# Patient Record
Sex: FEMALE | Race: WHITE | Employment: OTHER | URBAN - NONMETROPOLITAN AREA
[De-identification: names, ages, dates, MRNs, and addresses within clinical notes are randomized per-mention and may not be internally consistent; named-entity substitution may affect disease eponyms.]

---

## 2017-05-23 ENCOUNTER — OFFICE VISIT (OUTPATIENT)
Dept: CARDIOLOGY CLINIC | Age: 82
End: 2017-05-23

## 2017-05-23 VITALS
HEIGHT: 63 IN | BODY MASS INDEX: 35.97 KG/M2 | DIASTOLIC BLOOD PRESSURE: 80 MMHG | WEIGHT: 203 LBS | SYSTOLIC BLOOD PRESSURE: 136 MMHG | HEART RATE: 90 BPM

## 2017-05-23 DIAGNOSIS — I10 ESSENTIAL HYPERTENSION: Primary | Chronic | ICD-10-CM

## 2017-05-23 DIAGNOSIS — I25.10 CORONARY ARTERY DISEASE INVOLVING NATIVE CORONARY ARTERY OF NATIVE HEART WITHOUT ANGINA PECTORIS: Chronic | ICD-10-CM

## 2017-05-23 DIAGNOSIS — I35.0 NONRHEUMATIC AORTIC VALVE STENOSIS: Chronic | ICD-10-CM

## 2017-05-23 DIAGNOSIS — Z95.2 S/P AVR (AORTIC VALVE REPLACEMENT): Chronic | ICD-10-CM

## 2017-05-23 PROCEDURE — 99214 OFFICE O/P EST MOD 30 MIN: CPT | Performed by: INTERNAL MEDICINE

## 2017-09-25 ENCOUNTER — OFFICE VISIT (OUTPATIENT)
Dept: CARDIOLOGY CLINIC | Age: 82
End: 2017-09-25

## 2017-09-25 VITALS
DIASTOLIC BLOOD PRESSURE: 80 MMHG | HEART RATE: 76 BPM | SYSTOLIC BLOOD PRESSURE: 138 MMHG | WEIGHT: 202 LBS | BODY MASS INDEX: 35.79 KG/M2 | HEIGHT: 63 IN

## 2017-09-25 DIAGNOSIS — I10 ESSENTIAL HYPERTENSION: Chronic | ICD-10-CM

## 2017-09-25 DIAGNOSIS — I48.19 PERSISTENT ATRIAL FIBRILLATION (HCC): Chronic | ICD-10-CM

## 2017-09-25 DIAGNOSIS — I25.10 CORONARY ARTERY DISEASE INVOLVING NATIVE CORONARY ARTERY OF NATIVE HEART WITHOUT ANGINA PECTORIS: Chronic | ICD-10-CM

## 2017-09-25 DIAGNOSIS — Z95.2 S/P AVR (AORTIC VALVE REPLACEMENT): Primary | Chronic | ICD-10-CM

## 2017-09-25 PROCEDURE — 99213 OFFICE O/P EST LOW 20 MIN: CPT | Performed by: INTERNAL MEDICINE

## 2017-09-25 RX ORDER — AMLODIPINE BESYLATE 5 MG/1
5 TABLET ORAL DAILY
Qty: 90 TABLET | Refills: 3 | Status: SHIPPED | OUTPATIENT
Start: 2017-09-25 | End: 2018-07-03 | Stop reason: SDUPTHER

## 2018-07-03 ENCOUNTER — OFFICE VISIT (OUTPATIENT)
Dept: CARDIOLOGY CLINIC | Age: 83
End: 2018-07-03

## 2018-07-03 ENCOUNTER — PROCEDURE VISIT (OUTPATIENT)
Dept: CARDIOLOGY CLINIC | Age: 83
End: 2018-07-03

## 2018-07-03 VITALS
HEART RATE: 80 BPM | WEIGHT: 192 LBS | DIASTOLIC BLOOD PRESSURE: 84 MMHG | SYSTOLIC BLOOD PRESSURE: 132 MMHG | BODY MASS INDEX: 34.02 KG/M2 | HEIGHT: 63 IN

## 2018-07-03 DIAGNOSIS — I48.19 PERSISTENT ATRIAL FIBRILLATION (HCC): Chronic | ICD-10-CM

## 2018-07-03 DIAGNOSIS — Z95.2 S/P AVR (AORTIC VALVE REPLACEMENT): Chronic | ICD-10-CM

## 2018-07-03 DIAGNOSIS — I25.10 CORONARY ARTERY DISEASE INVOLVING NATIVE CORONARY ARTERY OF NATIVE HEART WITHOUT ANGINA PECTORIS: Primary | Chronic | ICD-10-CM

## 2018-07-03 LAB
LV EF: 60 %
LVEF MODALITY: NORMAL

## 2018-07-03 PROCEDURE — 93000 ELECTROCARDIOGRAM COMPLETE: CPT | Performed by: INTERNAL MEDICINE

## 2018-07-03 PROCEDURE — 99214 OFFICE O/P EST MOD 30 MIN: CPT | Performed by: INTERNAL MEDICINE

## 2018-07-03 PROCEDURE — 93306 TTE W/DOPPLER COMPLETE: CPT | Performed by: INTERNAL MEDICINE

## 2018-07-03 RX ORDER — AMLODIPINE BESYLATE 5 MG/1
5 TABLET ORAL DAILY
Qty: 90 TABLET | Refills: 3 | Status: SHIPPED | OUTPATIENT
Start: 2018-07-03 | End: 2019-05-31 | Stop reason: SDUPTHER

## 2018-07-03 NOTE — LETTER
[address] Cardiology [address] [address]  Phone: [phone]  Fax: [phone]    Margie Logan MD        July 5, 2018     Mela Marinelli MD  [address] [place] [id number]    Patient: Ro Little  MR Number: [id number]  YOB: 1935  Date of Visit: 7/3/2018    Dear Dr. Mela Marinelli:     Below are the relevant portions of my assessment and plan of care. [address]   Cardiac f/up    Referring Provider:  Mela Marinelli MD     Chief Complaint   Patient presents with    Coronary Artery Disease     Had echocardiogram today    Hypertension    Atrial Fibrillation        History of Present Illness:   Evert Vogel is an 80year old woman with a history of CVA , aortic valve replacement with porcine, and paroxysmal a fib. Has been taking Xarelto 10 mg daily and cardiologist in [place] asked her to increase to 15 mg. Has been taking it only a few times a week due to bruising. Is in [place] to April . Denies any recurrence of CVA symptoms, no shortness of breath,had her echo 7/3/18 prior to this visit. Past Medical History:   has a past medical history of Aortic valve disease; CAD (coronary artery disease); CVA (cerebral infarction); and Hypertension. Surgical History:   has a past surgical history that includes Coronary angioplasty with stent (12/22/2004); Total knee arthroplasty (Right, 7/15/2013); Cataract removal with implant (Left, 10/9/2015); Cardiac catheterization (11/19/2015); and Aortic valve replacement (12/4/2015). Social History:   reports that she has never smoked. She has never used smokeless tobacco. She reports that she does not drink alcohol or use drugs. Family History:  family history includes Heart Disease in her father. Home Medications:  Prior to Admission medications    Medication Sig Start Date End Date Taking?  Authorizing Provider rivaroxaban (XARELTO) 10 MG TABS tablet Take 1 tablet by mouth daily  Patient taking differently: Take 15 mg by mouth daily  9/25/17  Yes Deidra Forrest MD   amLODIPine [facility]) 5 MG tablet Take 1 tablet by mouth daily 9/25/17  Yes Deidra Forrest MD   Coenzyme Q10 (COQ-10 PO) Take by mouth   Yes Historical Provider, MD   aspirin 81 MG tablet Take 1 tablet by mouth three times a week  Patient taking differently: Take 81 mg by mouth daily  10/7/16  Yes Deidra Forrest MD   therapeutic multivitamin-minerals [facility]) tablet Take 1 tablet by mouth daily. Yes Historical Provider, MD        Allergies:  Lipitor and Penicillins     Review of Systems:   · Constitutional: there has been no unanticipated weight loss. There's been no change in energy level, sleep pattern, or activity level. · Eyes: No visual changes or diplopia. No scleral icterus. · ENT: No Headaches, hearing loss or vertigo. No mouth sores or sore throat. · Cardiovascular: Reviewed in HPI  · Respiratory: No cough or wheezing, no sputum production. No hematemesis. · Gastrointestinal: No abdominal pain, appetite loss, blood in stools. No change in bowel or bladder habits. · Genitourinary: No dysuria, trouble voiding, or hematuria. · Musculoskeletal:  No gait disturbance, weakness or joint complaints. · Integumentary: No rash or pruritis. · Neurological: No headache, diplopia, change in muscle strength, numbness or tingling. No change in gait, balance, coordination, mood, affect, memory, mentation, behavior. · Psychiatric: No anxiety, no depression. · Endocrine: No malaise, fatigue or temperature intolerance. No excessive thirst, fluid intake, or urination. No tremor. · Hematologic/Lymphatic: No abnormal bruising or bleeding, blood clots or swollen lymph nodes. · Allergic/Immunologic: No nasal congestion or hives.     Physical Examination:    Vitals:    07/03/18 1247   BP: 132/84   Pulse: 80 Constitutional and General Appearance: alert,oriented, no acute distress  Skin:good turgor,intact without lesions  HEENT: EOMI ,normal  Neck:no JVD    Respiratory:  · Normal excursion and expansion without use of accessory muscles  · Resp Auscultation: Normal breath sounds without dullness  Cardiovascular:  · The apical impulses not displaced  · Heart tones are crisp and normal  · Cervical veins are not engorged  · The carotid upstroke is normal in amplitude and contour without delay or bruit  · Peripheral pulses are symmetrical and full  · There is no clubbing, cyanosis of the extremities. · No edema  · Femoral Arteries: 2+ and equal  · Pedal Pulses: 2+ and equal   Abdomen:  · No masses or tenderness  · Liver/Spleen: No Abnormalities Noted  Neurological/Psychiatric:  · Alert and oriented in all spheres  · Moves all extremities well  · Exhibits normal gait balance and coordination  · No abnormalities of mood, affect, memory, mentation, or behavior are noted     Problem: Atrial fibrillation  Detected while exercising during cardiac rehab  EKG 9/26/16 showed Afib  EKG 7/3/18 sinus rhythm - a fib resolved. Does not want to be on Coumadin . Was discussed by Dr. Shilo Davenport who concurs that Xarelto is adequate as opposed to Coumadin since she has a tissue valve. She takes Xarelto 10mg qd (higher dose caused gives bruising).      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 Renu Ovalles. On xarelto due to AF/anti-coagulation. Repeat ECHO in May 2018.     Problem: CAD  Stable. No anginal symptoms. 2004: PCI of proximal LAD  2013: Cath with LM normal, LAD nid 30%, distal 40%, D1-40%, D4-50%(small), widely patent proximal stent, Cx Ostial 20%  RCA Mid 30% X2, and LVG 60%. Medically managed  11/2015: No flow-significant coronary stenoses are identified. The left anterior descending stent is widely patent.      Assessment/Plan:

## 2018-07-03 NOTE — PROGRESS NOTES
therapeutic multivitamin-minerals (THERAGRAN-M) tablet Take 1 tablet by mouth daily. Yes Historical Provider, MD        Allergies:  Lipitor and Penicillins     Review of Systems:   · Constitutional: there has been no unanticipated weight loss. There's been no change in energy level, sleep pattern, or activity level. · Eyes: No visual changes or diplopia. No scleral icterus. · ENT: No Headaches, hearing loss or vertigo. No mouth sores or sore throat. · Cardiovascular: Reviewed in HPI  · Respiratory: No cough or wheezing, no sputum production. No hematemesis. · Gastrointestinal: No abdominal pain, appetite loss, blood in stools. No change in bowel or bladder habits. · Genitourinary: No dysuria, trouble voiding, or hematuria. · Musculoskeletal:  No gait disturbance, weakness or joint complaints. · Integumentary: No rash or pruritis. · Neurological: No headache, diplopia, change in muscle strength, numbness or tingling. No change in gait, balance, coordination, mood, affect, memory, mentation, behavior. · Psychiatric: No anxiety, no depression. · Endocrine: No malaise, fatigue or temperature intolerance. No excessive thirst, fluid intake, or urination. No tremor. · Hematologic/Lymphatic: No abnormal bruising or bleeding, blood clots or swollen lymph nodes. · Allergic/Immunologic: No nasal congestion or hives.     Physical Examination:    Vitals:    07/03/18 1247   BP: 132/84   Pulse: 80        Constitutional and General Appearance: alert,oriented, no acute distress  Skin:good turgor,intact without lesions  HEENT: EOMI ,normal  Neck:no JVD    Respiratory:  · Normal excursion and expansion without use of accessory muscles  · Resp Auscultation: Normal breath sounds without dullness  Cardiovascular:  · The apical impulses not displaced  · Heart tones are crisp and normal  · Cervical veins are not engorged  · The carotid upstroke is normal in amplitude and contour without delay or bruit  · Peripheral pulses are symmetrical and full  · There is no clubbing, cyanosis of the extremities. · No edema  · Femoral Arteries: 2+ and equal  · Pedal Pulses: 2+ and equal   Abdomen:  · No masses or tenderness  · Liver/Spleen: No Abnormalities Noted  Neurological/Psychiatric:  · Alert and oriented in all spheres  · Moves all extremities well  · Exhibits normal gait balance and coordination  · No abnormalities of mood, affect, memory, mentation, or behavior are noted     Problem: Atrial fibrillation  Detected while exercising during cardiac rehab  EKG 9/26/16 showed Afib  EKG 7/3/18 sinus rhythm - a fib resolved. Does not want to be on Coumadin . Was discussed by Dr. Ant Amaya who concurs that Xarelto is adequate as opposed to Coumadin since she has a tissue valve. She takes Xarelto 10mg qd (higher dose caused gives bruising).      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 Kendra Meadows. On xarelto due to AF/anti-coagulation. Repeat ECHO in May 2018.     Problem: CAD  Stable. No anginal symptoms. 2004: PCI of proximal LAD  2013: Cath with LM normal, LAD nid 30%, distal 40%, D1-40%, D4-50%(small), widely patent proximal stent, Cx Ostial 20%  RCA Mid 30% X2, and LVG 60%. Medically managed  11/2015: No flow-significant coronary stenoses are identified. The left anterior descending stent is widely patent. Assessment/Plan:    1. Coronary artery disease involving native coronary artery of native heart without angina pectoris    2. S/P AVR (aortic valve replacement)7/3/18 Has slight regurgitation around the valve ring- will repeat echo in 1 year   3. Persistent atrial fibrillation ([address] - resolved ; EKG 7/3/18 sinus rhythm       Has been taking a subtherapeutic dose of Xarelto on 15 mg a few times a week with no untoward events. Have discussed risks/benefits. Will stop the Xarelto and stay on the ASA 81 mg  Will be seen in April 2019 when they return from [place].     I appreciate the opportunity of cooperating in the care of this individual.    Juve Serrano.  Marylen Loach, M.D., [facility]

## 2018-07-05 NOTE — COMMUNICATION BODY
[address]   Cardiac f/up    Referring Provider:  Anup Viveros MD     Chief Complaint   Patient presents with    Coronary Artery Disease     Had echocardiogram today    Hypertension    Atrial Fibrillation        History of Present Illness:   Lizabeth Kelly is an 80year old woman with a history of CVA , aortic valve replacement with porcine, and paroxysmal a fib. Has been taking Xarelto 10 mg daily and cardiologist in [place] asked her to increase to 15 mg. Has been taking it only a few times a week due to bruising. Is in [place] to [place] . Denies any recurrence of CVA symptoms, no shortness of breath,had her echo 7/3/18 prior to this visit. Past Medical History:   has a past medical history of Aortic valve disease; CAD (coronary artery disease); CVA (cerebral infarction); and Hypertension. Surgical History:   has a past surgical history that includes Coronary angioplasty with stent (12/22/2004); Total knee arthroplasty (Right, 7/15/2013); Cataract removal with implant (Left, 10/9/2015); Cardiac catheterization (11/19/2015); and Aortic valve replacement (12/4/2015). Social History:   reports that she has never smoked. She has never used smokeless tobacco. She reports that she does not drink alcohol or use drugs. Family History:  family history includes Heart Disease in her father. Home Medications:  Prior to Admission medications    Medication Sig Start Date End Date Taking?  Authorizing Provider   rivaroxaban (XARELTO) 10 MG TABS tablet Take 1 tablet by mouth daily  Patient taking differently: Take 15 mg by mouth daily  9/25/17  Yes Evi Strickland MD   amLODIPine [facility]) 5 MG tablet Take 1 tablet by mouth daily 9/25/17  Yes Evi Strickland MD   Coenzyme Q10 (COQ-10 PO) Take by mouth   Yes Historical Provider, MD   aspirin 81 MG tablet Take 1 tablet by mouth three times a week  Patient taking differently: Take 81 mg by mouth daily  10/7/16  Yes Evi Strickland MD opportunity of cooperating in the care of this individual.    Shad Mcqueen.  Mery Lorenzo M.D., [facility]

## 2018-09-24 NOTE — PROGRESS NOTES
engorged  · The carotid upstroke is normal in amplitude and contour without delay or bruit  · Peripheral pulses are symmetrical and full  · There is no clubbing, cyanosis of the extremities. · No edema  · Femoral Arteries: 2+ and equal  · Pedal Pulses: 2+ and equal   Abdomen:  · No masses or tenderness  · Liver/Spleen: No Abnormalities Noted  Neurological/Psychiatric:  · Alert and oriented in all spheres  · Moves all extremities well  · Exhibits normal gait balance and coordination  · No abnormalities of mood, affect, memory, mentation, or behavior are noted     Assessment:    Problem: CAD  Stable. No anginal symptoms. [facility] 2004> PCI of proximal LAD  [facility] 2013> Cath with LM normal, LAD mid 30%, distal 40%, D1-40%, D4-50%(small), widely patent proximal stent, Cx Ostial 20%. RCA Mid 30% X2 and LVG 60%. Medically managed  [facility] 2015> No flow-significant coronary stenoses are identified. The left anterior descending stent is widely patent. 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 Lorena Garduno. Taking baby asa,  Repeat ECHO July 2019. Problem: Hypertension  Stable  Blood pressure 126/74, pulse 80, height 5' 3\" (1.6 m), weight 187 lb 12.8 oz (85.2 kg). Problem: Atrial fibrillation, resolved  Detected while exercising during cardiac rehab  EKG 9/26/16 showed Afib  EKG 7/3/18 sinus rhythm - a fib resolved. High dose Xarelto gave her bruising. Takes baby asa.           Plan:  [facility] has a stable cardiac status. 1. No med changes. 2. Will continue with risk factor modifications. 3. Return for regular follow up in 6 months with ECHO same day (in [facility]). I appreciate the opportunity of cooperating in the care of this individual.    Bill Liu. Escobar Hull M.D., [facility]    This note was scribed in the presence of Dr. Escobar Hull MD, by Kristine Morris RN. The scribe's documentation has been prepared under my direction and personally reviewed by me in its entirety.  I confirm that the note

## 2018-09-26 ENCOUNTER — OFFICE VISIT (OUTPATIENT)
Dept: CARDIOLOGY CLINIC | Age: 83
End: 2018-09-26
Payer: MEDICARE

## 2018-09-26 VITALS
SYSTOLIC BLOOD PRESSURE: 126 MMHG | HEIGHT: 63 IN | DIASTOLIC BLOOD PRESSURE: 74 MMHG | HEART RATE: 80 BPM | WEIGHT: 187.8 LBS | BODY MASS INDEX: 33.27 KG/M2

## 2018-09-26 DIAGNOSIS — I48.0 PAF (PAROXYSMAL ATRIAL FIBRILLATION) (HCC): ICD-10-CM

## 2018-09-26 DIAGNOSIS — I25.10 CORONARY ARTERY DISEASE INVOLVING NATIVE CORONARY ARTERY OF NATIVE HEART WITHOUT ANGINA PECTORIS: Chronic | ICD-10-CM

## 2018-09-26 DIAGNOSIS — Z95.2 S/P AVR (AORTIC VALVE REPLACEMENT): Primary | Chronic | ICD-10-CM

## 2018-09-26 DIAGNOSIS — I10 ESSENTIAL HYPERTENSION: Chronic | ICD-10-CM

## 2018-09-26 PROCEDURE — 99214 OFFICE O/P EST MOD 30 MIN: CPT | Performed by: INTERNAL MEDICINE

## 2018-09-26 NOTE — LETTER
[address] [facility]  [address]  [address] [address]  Phone: [phone]  Fax: [phone]    Sari Titus MD        September 28, 2018     Erich Bower MD  [address] [place] [address]    Patient: Juan Hayward  MR Number: [id number]  YOB: 1935  Date of Visit: 9/26/2018    Dear Dr. Erich Bower:    Below are the relevant portions of my assessment and plan of care. [facility]   Cardiac Follow up    Referring Provider:  Erich Bower MD     Chief Complaint   Patient presents with    6 Month Follow-Up    Coronary Artery Disease    Cardiac Valve Problem     AVE    Hypertension    Atrial Fibrillation     history of      History of Present Illness:   Alyssa Ascencio is here today for routine follow of her porcine aortic valve replacement and paroxysmal a fib. She has had a CVA. Her last EKG showed NSR. Today, she reports an episode of pressure that felt like\"indigestion\" in center of chest a few days ago just before gong to bed. She drank some water, then went to bed. No episodes before or since. She does attend cardiac rehab [facility]) twice a week without issues. She denies exertional chest pain, FARRIS/PND, palpitations, light-headedness, edema. She khalil in [place] and sees a cardiologist there as well. Past Medical History:   has a past medical history of Aortic valve disease; CAD (coronary artery disease); CVA (cerebral infarction); and Hypertension. Surgical History:   has a past surgical history that includes Coronary angioplasty with stent (12/22/2004); Total knee arthroplasty (Right, 7/15/2013); Cataract removal with implant (Left, 10/9/2015); Cardiac catheterization (11/19/2015); and Aortic valve replacement (12/4/2015). Social History:   reports that she has never smoked. She has never used smokeless tobacco. She reports that she does not drink alcohol or use drugs. Family History:  family history includes Heart Disease in her father. Home Medications:  Prior to Admission medications    Medication Sig Start Date End Date Taking? Authorizing Provider   amLODIPine (NORVASC) 5 MG tablet Take 1 tablet by mouth daily 7/3/18   Brent Charles MD   Coenzyme Q10 (COQ-10 PO) Take by mouth    Historical Provider, MD   aspirin 81 MG tablet Take 1 tablet by mouth three times a week  Patient taking differently: Take 81 mg by mouth daily  10/7/16   South Small MD   therapeutic multivitamin-minerals [facility]) tablet Take 1 tablet by mouth daily. Historical Provider, MD      Allergies:  Lipitor and Penicillins     Review of Systems:   · Constitutional: there has been no unanticipated weight loss. There's been no change in energy level, sleep pattern, or activity level. · Eyes: No visual changes or diplopia. No scleral icterus. · ENT: No Headaches, hearing loss or vertigo. No mouth sores or sore throat. · Cardiovascular: Reviewed in HPI  · Respiratory: No cough or wheezing, no sputum production. No hematemesis. · Gastrointestinal: No abdominal pain, appetite loss, blood in stools. No change in bowel or bladder habits. · Genitourinary: No dysuria, trouble voiding, or hematuria. · Musculoskeletal:  No gait disturbance, weakness or joint complaints. · Integumentary: No rash or pruritis. · Neurological: No headache, diplopia, change in muscle strength, numbness or tingling. No change in gait, balance, coordination, mood, affect, memory, mentation, behavior. · Psychiatric: No anxiety, no depression. · Endocrine: No malaise, fatigue or temperature intolerance. No excessive thirst, fluid intake, or urination. No tremor. · Hematologic/Lymphatic: No abnormal bruising or bleeding, blood clots or swollen lymph nodes. · Allergic/Immunologic: No nasal congestion or hives.     Physical Examination:    Blood pressure 126/74, pulse 80, height 5' 3\" (1.6 m), weight 187 lb 12.8 oz (85.2 kg), not currently breastfeeding. Constitutional and General Appearance: alert,oriented, no acute distress  Skin:good turgor,intact without lesions  HEENT: EOMI ,normal  Neck:no JVD    Respiratory:  · Normal excursion and expansion without use of accessory muscles  · Resp Auscultation: Normal breath sounds without dullness  Cardiovascular:  · The apical impulses not displaced  · Heart tones are crisp and normal  · Cervical veins are not engorged  · The carotid upstroke is normal in amplitude and contour without delay or bruit  · Peripheral pulses are symmetrical and full  · There is no clubbing, cyanosis of the extremities. · No edema  · Femoral Arteries: 2+ and equal  · Pedal Pulses: 2+ and equal   Abdomen:  · No masses or tenderness  · Liver/Spleen: No Abnormalities Noted  Neurological/Psychiatric:  · Alert and oriented in all spheres  · Moves all extremities well  · Exhibits normal gait balance and coordination  · No abnormalities of mood, affect, memory, mentation, or behavior are noted     Assessment:    Problem: CAD  Stable. No anginal symptoms. [facility] 2004> PCI of proximal LAD  [facility] 2013> Cath with LM normal, LAD mid 30%, distal 40%, D1-40%, D4-50%(small), widely patent proximal stent, Cx Ostial 20%. RCA Mid 30% X2 and LVG 60%. Medically managed  [facility] 2015> No flow-significant coronary stenoses are identified. The left anterior descending stent is widely patent. 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 Marilu Vaughn. Taking baby asa,  Repeat ECHO July 2019. Problem: Hypertension  Stable  Blood pressure 126/74, pulse 80, height 5' 3\" (1.6 m), weight 187 lb 12.8 oz (85.2 kg). Problem: Atrial fibrillation, resolved  Detected while exercising during cardiac rehab  EKG 9/26/16 showed Afib  EKG 7/3/18 sinus rhythm - a fib resolved. High dose Xarelto gave her bruising. Takes baby asa.           Plan:  Luc Rodríguez has a stable cardiac status. 1. No med changes. 2. Will continue with risk factor modifications. 3. Return for regular follow up in 6 months with ECHO same day (in [facility]). I appreciate the opportunity of cooperating in the care of this individual.    Camila Morfin. Alyson Thao M.D., [facility]    This note was scribed in the presence of Dr. Alyson Thao MD, by Becki Darden, NIKI. The scribe's documentation has been prepared under my direction and personally reviewed by me in its entirety. I confirm that the note above accurately reflects all work, treatment, procedures, and medical decision making performed by me. If you have questions, please do not hesitate to call me. I look forward to following Guanakito Fears along with you.     Sincerely,        Chantel Kauffman MD

## 2018-09-28 NOTE — COMMUNICATION BODY
mouth daily  10/7/16   Tom Harris MD   therapeutic multivitamin-minerals [facility]) tablet Take 1 tablet by mouth daily. Historical Provider, MD      Allergies:  Lipitor and Penicillins     Review of Systems:   · Constitutional: there has been no unanticipated weight loss. There's been no change in energy level, sleep pattern, or activity level. · Eyes: No visual changes or diplopia. No scleral icterus. · ENT: No Headaches, hearing loss or vertigo. No mouth sores or sore throat. · Cardiovascular: Reviewed in HPI  · Respiratory: No cough or wheezing, no sputum production. No hematemesis. · Gastrointestinal: No abdominal pain, appetite loss, blood in stools. No change in bowel or bladder habits. · Genitourinary: No dysuria, trouble voiding, or hematuria. · Musculoskeletal:  No gait disturbance, weakness or joint complaints. · Integumentary: No rash or pruritis. · Neurological: No headache, diplopia, change in muscle strength, numbness or tingling. No change in gait, balance, coordination, mood, affect, memory, mentation, behavior. · Psychiatric: No anxiety, no depression. · Endocrine: No malaise, fatigue or temperature intolerance. No excessive thirst, fluid intake, or urination. No tremor. · Hematologic/Lymphatic: No abnormal bruising or bleeding, blood clots or swollen lymph nodes. · Allergic/Immunologic: No nasal congestion or hives. Physical Examination:    Blood pressure 126/74, pulse 80, height 5' 3\" (1.6 m), weight 187 lb 12.8 oz (85.2 kg), not currently breastfeeding.     Constitutional and General Appearance: alert,oriented, no acute distress  Skin:good turgor,intact without lesions  HEENT: EOMI ,normal  Neck:no JVD    Respiratory:  · Normal excursion and expansion without use of accessory muscles  · Resp Auscultation: Normal breath sounds without dullness  Cardiovascular:  · The apical impulses not displaced  · Heart tones are crisp and normal  · Cervical veins are not above accurately reflects all work, treatment, procedures, and medical decision making performed by me.

## 2019-05-29 NOTE — PROGRESS NOTES
MG tablet take 2 tablets by mouth on day one then 1 tablet daily on days 2 thru [address] [place] 5/28/19   Historical Provider, MD   predniSONE (DELTASONE) 10 MG tablet TAKE ONE TABLET BY MOUTH TWICE DAILY 5/28/19   Historical Provider, MD      Allergies:  Lipitor and Penicillins     Review of Systems:   · Constitutional: there has been no unanticipated weight loss. There's been no change in energy level, sleep pattern, or activity level. · Eyes: No visual changes or diplopia. No scleral icterus. · ENT: No Headaches, hearing loss or vertigo. No mouth sores or sore throat. · Cardiovascular: Reviewed in HPI  · Respiratory: No cough or wheezing, no sputum production. No hematemesis. · Gastrointestinal: No abdominal pain, appetite loss, blood in stools. No change in bowel or bladder habits. · Genitourinary: No dysuria, trouble voiding, or hematuria. · Musculoskeletal:  No gait disturbance, weakness or joint complaints. · Integumentary: No rash or pruritis. · Neurological: No headache, diplopia, change in muscle strength, numbness or tingling. No change in gait, balance, coordination, mood, affect, memory, mentation, behavior. · Psychiatric: No anxiety, no depression. · Endocrine: No malaise, fatigue or temperature intolerance. No excessive thirst, fluid intake, or urination. No tremor. · Hematologic/Lymphatic: No abnormal bruising or bleeding, blood clots or swollen lymph nodes. · Allergic/Immunologic: No nasal congestion or hives. Physical Examination:    Blood pressure (!) 160/82, pulse 84, height 5' 3\" (1.6 m), weight 181 lb (82.1 kg), SpO2 99 %, not currently breastfeeding.     Constitutional and General Appearance: alert,oriented, no acute distress  Skin:good turgor,intact without lesions  HEENT: EOMI ,normal  Neck:no JVD    Respiratory:  · Normal excursion and expansion without use of accessory muscles  · Resp Auscultation: Normal breath sounds without dullness  Cardiovascular:  · The apical

## 2019-05-31 ENCOUNTER — PROCEDURE VISIT (OUTPATIENT)
Dept: CARDIOLOGY CLINIC | Age: 84
End: 2019-05-31
Payer: MEDICARE

## 2019-05-31 ENCOUNTER — OFFICE VISIT (OUTPATIENT)
Dept: CARDIOLOGY CLINIC | Age: 84
End: 2019-05-31
Payer: MEDICARE

## 2019-05-31 VITALS
HEIGHT: 63 IN | BODY MASS INDEX: 32.07 KG/M2 | OXYGEN SATURATION: 99 % | SYSTOLIC BLOOD PRESSURE: 160 MMHG | DIASTOLIC BLOOD PRESSURE: 82 MMHG | WEIGHT: 181 LBS | HEART RATE: 84 BPM

## 2019-05-31 DIAGNOSIS — Z95.2 S/P AVR (AORTIC VALVE REPLACEMENT): Chronic | ICD-10-CM

## 2019-05-31 DIAGNOSIS — I25.10 CORONARY ARTERY DISEASE INVOLVING NATIVE CORONARY ARTERY OF NATIVE HEART WITHOUT ANGINA PECTORIS: Chronic | ICD-10-CM

## 2019-05-31 DIAGNOSIS — I48.0 PAF (PAROXYSMAL ATRIAL FIBRILLATION) (HCC): ICD-10-CM

## 2019-05-31 DIAGNOSIS — I25.10 CORONARY ARTERY DISEASE INVOLVING NATIVE CORONARY ARTERY OF NATIVE HEART WITHOUT ANGINA PECTORIS: Primary | Chronic | ICD-10-CM

## 2019-05-31 DIAGNOSIS — I35.0 NONRHEUMATIC AORTIC VALVE STENOSIS: Chronic | ICD-10-CM

## 2019-05-31 DIAGNOSIS — I10 ESSENTIAL HYPERTENSION: Chronic | ICD-10-CM

## 2019-05-31 LAB
LV EF: 60 %
LVEF MODALITY: NORMAL

## 2019-05-31 PROCEDURE — 93306 TTE W/DOPPLER COMPLETE: CPT | Performed by: INTERNAL MEDICINE

## 2019-05-31 PROCEDURE — 99214 OFFICE O/P EST MOD 30 MIN: CPT | Performed by: INTERNAL MEDICINE

## 2019-05-31 RX ORDER — PREDNISONE 10 MG/1
TABLET ORAL
Refills: 1 | Status: ON HOLD | COMMUNITY
Start: 2019-05-28 | End: 2019-08-08 | Stop reason: HOSPADM

## 2019-05-31 RX ORDER — AMLODIPINE BESYLATE 5 MG/1
5 TABLET ORAL DAILY
Qty: 90 TABLET | Refills: 3 | Status: ON HOLD | OUTPATIENT
Start: 2019-05-31 | End: 2019-08-08 | Stop reason: HOSPADM

## 2019-05-31 RX ORDER — AZITHROMYCIN 250 MG/1
TABLET, FILM COATED ORAL
Refills: 1 | Status: ON HOLD | COMMUNITY
Start: 2019-05-28 | End: 2019-08-08 | Stop reason: HOSPADM

## 2019-07-29 ENCOUNTER — HOSPITAL ENCOUNTER (INPATIENT)
Age: 84
LOS: 11 days | Discharge: HOME | DRG: 057 | End: 2019-08-09
Attending: PHYSICAL MEDICINE & REHABILITATION | Admitting: PHYSICAL MEDICINE & REHABILITATION
Payer: MEDICARE

## 2019-07-29 PROBLEM — I63.9 ACUTE ISCHEMIC STROKE (HCC): Status: ACTIVE | Noted: 2019-07-29

## 2019-07-29 PROCEDURE — 1280000000 HC REHAB R&B

## 2019-07-29 PROCEDURE — 94760 N-INVAS EAR/PLS OXIMETRY 1: CPT

## 2019-07-29 RX ORDER — CLOPIDOGREL BISULFATE 75 MG/1
75 TABLET ORAL DAILY
Status: DISCONTINUED | OUTPATIENT
Start: 2019-07-30 | End: 2019-08-09 | Stop reason: HOSPADM

## 2019-07-29 RX ORDER — AMLODIPINE BESYLATE 5 MG/1
10 TABLET ORAL DAILY
Status: DISCONTINUED | OUTPATIENT
Start: 2019-07-30 | End: 2019-08-09 | Stop reason: HOSPADM

## 2019-07-29 RX ORDER — ASPIRIN 81 MG/1
81 TABLET, CHEWABLE ORAL DAILY
Status: DISCONTINUED | OUTPATIENT
Start: 2019-07-30 | End: 2019-08-09 | Stop reason: HOSPADM

## 2019-07-29 RX ORDER — ACETAMINOPHEN 325 MG/1
650 TABLET ORAL EVERY 4 HOURS PRN
Status: DISCONTINUED | OUTPATIENT
Start: 2019-07-29 | End: 2019-08-09 | Stop reason: HOSPADM

## 2019-07-29 RX ORDER — TRAMADOL HYDROCHLORIDE 50 MG/1
50 TABLET ORAL EVERY 6 HOURS PRN
Status: DISCONTINUED | OUTPATIENT
Start: 2019-07-29 | End: 2019-08-09 | Stop reason: HOSPADM

## 2019-07-29 RX ORDER — ONDANSETRON 4 MG/1
4 TABLET, ORALLY DISINTEGRATING ORAL EVERY 8 HOURS PRN
Status: DISCONTINUED | OUTPATIENT
Start: 2019-07-29 | End: 2019-08-09 | Stop reason: HOSPADM

## 2019-07-29 RX ORDER — DIPHENHYDRAMINE HCL 25 MG
25 TABLET ORAL EVERY 6 HOURS PRN
Status: DISCONTINUED | OUTPATIENT
Start: 2019-07-29 | End: 2019-08-09 | Stop reason: HOSPADM

## 2019-07-29 RX ORDER — HYDRALAZINE HYDROCHLORIDE 25 MG/1
50 TABLET, FILM COATED ORAL EVERY 8 HOURS PRN
Status: DISCONTINUED | OUTPATIENT
Start: 2019-07-29 | End: 2019-08-09 | Stop reason: HOSPADM

## 2019-07-29 RX ORDER — TRAZODONE HYDROCHLORIDE 50 MG/1
50 TABLET ORAL NIGHTLY PRN
Status: DISCONTINUED | OUTPATIENT
Start: 2019-07-29 | End: 2019-08-09 | Stop reason: HOSPADM

## 2019-07-29 ASSESSMENT — PAIN SCALES - GENERAL: PAINLEVEL_OUTOF10: 0

## 2019-07-29 NOTE — FLOWSHEET NOTE
Patient admitted to room 4900  Assisted patient to bed with minimal assist of one person. Oriented to room, call light, phone, TV, thermostat, bed controls, bathroom, emergency cord, white board, therapy times, unit routine, visiting hours,  and meal times. Patient verbalized understanding. States bowel routine is every day  Call light and personal items in reach, alarms active and in place.          Patient has bilateral hearing aides at home but is afraid to bring them here because \" they might get lost\"

## 2019-07-29 NOTE — FLOWSHEET NOTE
Patient refused the camera to be placed in her room.  Walked to the bathroom with the use of a walker, gait steady, kept picking the walker up, educated patient that it had wheels on the front and she did not have to pick it up , Patient V/U

## 2019-07-30 LAB
ANION GAP SERPL CALCULATED.3IONS-SCNC: 10 MMOL/L (ref 3–16)
BUN BLDV-MCNC: 18 MG/DL (ref 7–20)
CALCIUM SERPL-MCNC: 9.4 MG/DL (ref 8.3–10.6)
CHLORIDE BLD-SCNC: 103 MMOL/L (ref 99–110)
CO2: 26 MMOL/L (ref 21–32)
CREAT SERPL-MCNC: 0.7 MG/DL (ref 0.6–1.2)
GFR AFRICAN AMERICAN: >60
GFR NON-AFRICAN AMERICAN: >60
GLUCOSE BLD-MCNC: 91 MG/DL (ref 70–99)
HCT VFR BLD CALC: 37.9 % (ref 36–48)
HEMOGLOBIN: 12 G/DL (ref 12–16)
MCH RBC QN AUTO: 26.8 PG (ref 26–34)
MCHC RBC AUTO-ENTMCNC: 31.7 G/DL (ref 31–36)
MCV RBC AUTO: 84.5 FL (ref 80–100)
PDW BLD-RTO: 20.1 % (ref 12.4–15.4)
PLATELET # BLD: 207 K/UL (ref 135–450)
PMV BLD AUTO: 10.3 FL (ref 5–10.5)
POTASSIUM SERPL-SCNC: 5.1 MMOL/L (ref 3.5–5.1)
RBC # BLD: 4.49 M/UL (ref 4–5.2)
SODIUM BLD-SCNC: 139 MMOL/L (ref 136–145)
WBC # BLD: 3.5 K/UL (ref 4–11)

## 2019-07-30 PROCEDURE — 97165 OT EVAL LOW COMPLEX 30 MIN: CPT

## 2019-07-30 PROCEDURE — 36415 COLL VENOUS BLD VENIPUNCTURE: CPT

## 2019-07-30 PROCEDURE — 97535 SELF CARE MNGMENT TRAINING: CPT

## 2019-07-30 PROCEDURE — 97116 GAIT TRAINING THERAPY: CPT

## 2019-07-30 PROCEDURE — 80048 BASIC METABOLIC PNL TOTAL CA: CPT

## 2019-07-30 PROCEDURE — 1280000000 HC REHAB R&B

## 2019-07-30 PROCEDURE — 92523 SPEECH SOUND LANG COMPREHEN: CPT

## 2019-07-30 PROCEDURE — 94760 N-INVAS EAR/PLS OXIMETRY 1: CPT

## 2019-07-30 PROCEDURE — 85027 COMPLETE CBC AUTOMATED: CPT

## 2019-07-30 PROCEDURE — 97530 THERAPEUTIC ACTIVITIES: CPT

## 2019-07-30 PROCEDURE — 6370000000 HC RX 637 (ALT 250 FOR IP): Performed by: PHYSICAL MEDICINE & REHABILITATION

## 2019-07-30 PROCEDURE — 97161 PT EVAL LOW COMPLEX 20 MIN: CPT

## 2019-07-30 PROCEDURE — 97110 THERAPEUTIC EXERCISES: CPT

## 2019-07-30 RX ADMIN — AMLODIPINE BESYLATE 10 MG: 5 TABLET ORAL at 08:02

## 2019-07-30 RX ADMIN — ASPIRIN 81 MG 81 MG: 81 TABLET ORAL at 08:03

## 2019-07-30 RX ADMIN — CLOPIDOGREL 75 MG: 75 TABLET, FILM COATED ORAL at 08:03

## 2019-07-30 ASSESSMENT — PAIN SCALES - GENERAL
PAINLEVEL_OUTOF10: 0
PAINLEVEL_OUTOF10: 0

## 2019-07-30 NOTE — CARE COORDINATION
Social Work Admission Assessment    Objective:  Met with pt to complete initial assessment and review role of  in rehab process. Pt oriented to unit. Pt states understanding of this. Current Home Situation:  Patient lives at home with , two granddtrs, and dog. Patient has received home care prior. Patient has a rolling walker at home already. Patient has advance directives in place already. Pt's plans re:  Return to work/school/volunteer:  Patient is retired. Accessibility to community resources/transportation:  Family to transport. Has pt experienced a recent loss or signigicant life event that would impact their care or ability to participate? __No  _X_Yes - Explain: Stroke with loss and deficits. Has pt ever been treated for emotional disorders? _X_No  __Yes--How does that affect current situation:    How does pt and family cope with stressful events and this hospitalization? Pt appears to be coping with these hospitalizations appropriately, no concerns voiced or observed. Special Problem Areas:  None. Discharge Plan:  Home with appropriate support, await progress with therapy for recommendations. Impression/Plan:  Jyoti Gamez (patient) is an 80year old female that has been admitted to ARU. Patient informed of team conference on Thursday after 11 a.m. Provided pt with this worker's card to contact as needed. Will continue to follow for support and discharge planning.  Gonzalo George, MSW, LSW

## 2019-07-30 NOTE — PROGRESS NOTES
AM assessment complete, charted in doc flowsheet. VSS. Pt up to bedside chair. Daughter in room for support to mother.   Call light in reach, will continue to monitor

## 2019-07-30 NOTE — H&P
Disease in her father. Allergies: Lipitor and Penicillins    Current Facility-Administered Medications   Medication Dose Route Frequency Provider Last Rate Last Dose    acetaminophen (TYLENOL) tablet 650 mg  650 mg Oral Q4H PRN Kg Mcmanus MD        magnesium hydroxide (MILK OF MAGNESIA) 400 MG/5ML suspension 30 mL  30 mL Oral Daily PRN Kg Mcmanus MD        amLODIPine (NORVASC) tablet 10 mg  10 mg Oral Daily Kg Mcmanus MD   10 mg at 07/30/19 0802    aspirin chewable tablet 81 mg  81 mg Oral Daily Kg Mcmanus MD   81 mg at 07/30/19 0803    clopidogrel (PLAVIX) tablet 75 mg  75 mg Oral Daily Kg Mcmanus MD   75 mg at 07/30/19 0803    diphenhydrAMINE (BENADRYL) tablet 25 mg  25 mg Oral Q6H PRN Kg Mcmanus MD        hydrALAZINE (APRESOLINE) tablet 50 mg  50 mg Oral Q8H PRN Kg Mcmanus MD        ondansetron (ZOFRAN-ODT) disintegrating tablet 4 mg  4 mg Oral Q8H PRN Kg Mcmanus MD        traMADol Leola Sheer) tablet 50 mg  50 mg Oral Q6H PRN Kg Mcmanus MD        traZODone (DESYREL) tablet 50 mg  50 mg Oral Nightly PRN Kg Mcmanus MD           REVIEW OF SYSTEMS:   CONSTITUTIONAL: negative for fevers, chills, diaphoresis, appetite change, fatigue, night sweats and unexpected weight change. EYES: negative for blurred vision, eye discharge, visual disturbance and icterus. HEENT: negative for hearing loss, tinnitus, ear drainage, sinus pressure, nasal congestion, and epistaxis. RESPIRATORY: Negative for hemoptysis, cough, sputum production. CARDIOVASCULAR: negative for chest pain, palpitations, exertional chest pressure/discomfort, edema, syncope. GASTROINTESTINAL: negative for nausea, vomiting, diarrhea, constipation, blood in stool and abdominal pain. GENITOURINARY: negative for frequency, dysuria, urinary incontinence, decreased urine volume, and hematuria. HEMATOLOGIC/LYMPHATIC: negative for easy bruising, bleeding and lymphadenopathy. Unremarkable  BONES: Unremarkable  OTHER: None    IMPRESSION      Mild small vessel ischemic change. Remote lacunar infarct    07/24/2019 11:00 AM EDT    HISTORY: Stroke, follow up left leg weakness  COMPARISON: 2015  NOTE: If there are questions about the content of this report, please contact [facility] radiology by calling [phone]  TECHNIQUE: Post intravenous contrast axial CT angiogram images with multiplanar reconstructions of the head including offline reconstructions on a separate workstation. Images were obtained using automated exposure control. 75 mL Optiray was   administered. Internal carotid artery stenoses are reported using the Jamie American Symptomatic Carotid Endarterectomy Trial (NASCET) criteria whereby the stenosis is expressed as a percentage from the view showing the greatest stenosis comparing the diameter of the   residual lumen with a luminal diameter of the internal carotid artery well beyond the bulb where the walls are parallel (Radiology 1993; 186: 316-318). FINDINGS:  Aortic arch: Mural calcification  Proximal great arteries: Normal  Right vertebral artery: Calcification at the origin and the distal right vertebral artery with high-grade stenosis distally. Left vertebral artery: Calcification at the origin of the left vertebral artery. There is also significant calcification stenosis in the distal left vertebral artery  Right common carotid artery: Normal  Right carotid bifurcation and bulb: Calcification of the carotid bulb and proximal right carotid artery with less than 50% narrowing. Right internal carotid artery: Calcification of the carotid siphon. Left common carotid artery: Normal  Left carotid bifurcation and bulb: Calcification at the carotid bifurcation with 50-70% stenosis at the origin of the left internal carotid artery. .  Left internal carotid artery: calcification of the carotid siphon        RIGHT MIDDLE CEREBRAL ARTERY: calcification at the origin.  No evidence of dissection or aneurysm. LEFT MIDDLE CEREBRAL ARTERY: Calcification at the origin. No evidence of dissection or aneurysm. RIGHT ANTERIOR CEREBRAL ARTERY: Unremarkable  LEFT ANTERIOR CEREBRAL ARTERY: Unremarkable  ANTERIOR COMMUNICATING ARTERY: Unremarkable    BASILAR ARTERY: Unremarkable  RIGHT POSTERIOR CEREBRAL ARTERY: Fetal origin of the right posterior cerebral artery directly from the internal carotid artery with absent P1 segment and no connection with the basilar artery, a normal variant  LEFT POSTERIOR CEREBRAL ARTERY: Unremarkable  LEFT POSTERIOR COMMUNICATING ARTERY: Not visualized    OTHER: None    IMPRESSION      No dissection or aneurysm    50-70% narrowing of the left carotid bifurcation and proximal internal carotid artery. Less than 50% narrowing at the right carotid bulb. Severe stenosis distal vertebral arteries bilaterally. 07/22/2019 2:04 PM EDT    HISTORY: Ataxia, stroke suspected. ataxia, stroke suspected, weakness on left side, trouble lifting left leg . COMPARISON: Head CT 7/27/2016. TECHNIQUE: Sagittal T1, coronal T2, and axial T1, T2, FLAIR, gradient, and diffusion weighted sequences were obtained through the brain on a 1.5 Iram magnet without administration of contrast.    FINDINGS:    Craniocervical junction / midline structures: Normal  Paranasal sinuses / mastoid air cells: There is minimal ethmoid mucosal thickening. Ventricles and Sulci: Mild, age-appropriate prominence. Ventricles remain symmetric and midline. . There is no extra-axial fluid collection. Vascular flow voids: Normal  Brain Parenchyma: There are punctate foci of diffusion restriction in the right lateral basal ganglia and adjacent corona radiata. There is an underlying remote lacunar infarct in this area. No evidence of mass effect or acute intracranial hemorrhage. Mild spotty white matter signal hyperintensity is seen within the deep white matter.   Other Notable Findings: has agreed to being admitted to our comprehensive inpatient  rehabilitation facility consisting of at least 180 minutes of therapy a day,  5 out of 7 days a week. The patient/family has a good understanding of our discharge process. The  patient has potential to make improvement and is in need of at least two of  the following multidisciplinary therapies including but not limited to  physical, occupational, respiratory, and speech, nutritional services, wound care, and prosthetics and orthotics. Given the patients complex condition  and risk of further medical complications, rehabilitation services cannot be  safely provided at a lower level of care such as a skilled nursing facility. I have compared the patients medical and functional status at the time of the  preadmission screening and the same on this date, and there are no significant changes except as documented below in the assessment and plan. By signing this document, I acknowledge that I have personally performed a  full physical examination on this patient within 24 hours of admission to  this inpatient rehabilitation facility and have determined the patient to be  able to tolerate the above course of treatment at an intensive level for a  reasonable period of time. I will be completing a detailed individualized  Plan of Care for this patient by day four of the patients stay based upon the  Preadmission Screen, this Post-Admission Evaluation, and the therapy  evaluations. Assessment and Plan:  Bilateral basal ganglia infarcts: R acute and L subacute with microhemorrhage. ASA 81, plavix. PT/OT for hemiparesis. SLP for eval.    Paroxysmal Afib: Cards to discuss possible AC. Consult placed. CAD: s/p CABG. ASA, plavix      HTN: norvasc 10    AS: s/p AVR. Bowels: Per protocol  Bladder: Per protocol   Sleep: Trazodone provided prn. Pain: tylenol, tramadol   DVT PPx: SCDs. Hold AC in setting of bleed.      Dylan Schulz MD 7/30/2019, 8:46 AM     * This document was created using dictation software. While all precautions were taken to ensure accuracy, errors may have occurred. Please disregard any typographical errors.

## 2019-07-31 PROCEDURE — 97127 HC SP THER IVNTJ W/FOCUS COG FUNCJ: CPT

## 2019-07-31 PROCEDURE — 94760 N-INVAS EAR/PLS OXIMETRY 1: CPT

## 2019-07-31 PROCEDURE — 99222 1ST HOSP IP/OBS MODERATE 55: CPT | Performed by: INTERNAL MEDICINE

## 2019-07-31 PROCEDURE — 1280000000 HC REHAB R&B

## 2019-07-31 PROCEDURE — 97530 THERAPEUTIC ACTIVITIES: CPT

## 2019-07-31 PROCEDURE — 97535 SELF CARE MNGMENT TRAINING: CPT

## 2019-07-31 PROCEDURE — 6370000000 HC RX 637 (ALT 250 FOR IP): Performed by: PHYSICAL MEDICINE & REHABILITATION

## 2019-07-31 PROCEDURE — 97116 GAIT TRAINING THERAPY: CPT

## 2019-07-31 RX ADMIN — ASPIRIN 81 MG 81 MG: 81 TABLET ORAL at 08:54

## 2019-07-31 RX ADMIN — CLOPIDOGREL 75 MG: 75 TABLET, FILM COATED ORAL at 08:54

## 2019-07-31 RX ADMIN — AMLODIPINE BESYLATE 10 MG: 5 TABLET ORAL at 08:54

## 2019-07-31 ASSESSMENT — PAIN SCALES - GENERAL: PAINLEVEL_OUTOF10: 0

## 2019-07-31 NOTE — PROGRESS NOTES
Physical Therapy  Facility/Department: [facility] ACUTE REHAB UNIT  Daily Treatment Note  NAME: Cecelia Vuong  : 1935  MRN: [id number]    Date of Service: 2019    Discharge Recommendations:  Home with assist PRN, Home with Home health PT, S Level 3   PT Equipment Recommendations  Equipment Needed: Yes  Mobility Devices: Carol Gubler: Rolling    Assessment   Body structures, Functions, Activity limitations: Decreased functional mobility ; Decreased strength;Decreased endurance;Decreased coordination;Decreased safe awareness;Decreased cognition;Decreased balance  Assessment: Pt continues with decreased coordination of LLE and decreased anterior weight shift in standing. Treatment Diagnosis: decreased coordination, balance, and functional mobility  Prognosis: Good  PT Education: Goals;PT Role;Transfer Training;General Safety;Plan of Care;Gait Training;Functional Mobility Training;Disease Specific Education  Barriers to Learning: cognition  REQUIRES PT FOLLOW UP: Yes  Activity Tolerance  Activity Tolerance: Patient Tolerated treatment well     Patient Diagnosis(es): There were no encounter diagnoses. has a past medical history of Aortic valve disease, CAD (coronary artery disease), CVA (cerebral infarction), and Hypertension. has a past surgical history that includes Coronary angioplasty with stent (2004); Total knee arthroplasty (Right, 7/15/2013); Cataract removal with implant (Left, 10/9/2015); Cardiac catheterization (2015); and Aortic valve replacement (2015). Restrictions  Restrictions/Precautions  Restrictions/Precautions: General Precautions, Fall Risk  Required Braces or Orthoses?: No  Position Activity Restriction  Other position/activity restrictions: 81 yo F admitted from [facility] presents with L side weakness, slurred speech.  Initially admitted at [facility]; OSH CTAHN revealed b/l vertebral stenosis, 63% stenosis of R ICA and stenosis of PCA; MRI brain from OSH revealed \"patchy acute infarct involving R lateral BG and adjacent corona radiata in area of remote infact\". Symptoms improved with d/c to home; around 7pm at home pt experienced sudden paralysis of L side of body, could not speak. Admitted to [facility] ED then to [facility]. Repeated MRI brain 7/25 revealed acute infarcts of right corona radiata/basal ganglion with microhemorrhages, deficits noted with subtle L side weakness in hand and leg  Subjective   General  Chart Reviewed: Yes  Additional Pertinent Hx: CAD, HTN  Response To Previous Treatment: Patient with no complaints from previous session. Family / Caregiver Present: No  Subjective  Subjective: Pt sitting in recliner on arrival and agreeable to therapy evaluation. Pt states she feels \"fuzzy since she had 2 norvasc this morning. \"  Pt states she does not always take her norvasc at home.   General Comment  Comments: /74 sitting with   Pain Screening  Patient Currently in Pain: Denies  Vital Signs  Patient Currently in Pain: Denies       Orientation     Cognition      Objective      Transfers  Sit to Stand: Contact guard assistance  Stand to sit: Contact guard assistance  Stand Pivot Transfers: Contact guard assistance  Ambulation  Ambulation?: Yes  Ambulation 1  Surface: level tile  Device: Rolling Walker  Assistance: Contact guard assistance  Quality of Gait: decreased L step length and height, L knee flexion during stance phase, medial/lateral sway, shuffling LLE, decreased arm swing B  Distance: 234'  Stairs/Curb  Stairs?: Yes  Stairs  # Steps : 14  Rails: Bilateral  Assistance: Contact guard assistance  Comment: alternating between reciprocal and step to pattern, decreased proprioception descending leading with LLE     Balance  Posture: Fair  Sitting - Static: Good  Sitting - Dynamic: Good  Standing - Static: Fair;-  Standing - Dynamic: Fair;-            Comment: 1st session:  Pt sitting in recliner on arrival.  Voicing concerns regarding BP

## 2019-07-31 NOTE — PROGRESS NOTES
ACUTE REHAB UNIT  SPEECH/LANGUAGE PATHOLOGY      [x] Daily  [] Weekly Care Conference Note  [] Discharge    Patient:Anuja Eid     :1935  VRF:[id number]  Room #: HVB-8076/9464-30   Rehab Dx/Hx: Acute ischemic stroke [facility]) [I63.9]  Date of Admit: 2019      Precautions: falls  Home situation: Pt lives at home with her  and two young-adult granddaughters. Pt was independent prior to admission   ST Dx: Cognitive linguistic deficits     Daily Treatment Info:   Date: 2019   Tx session 1   Pain Pt did not report pain   Subjective     Pt up in recliner chair. Reported her daughter was present this morning and assisted her with getting ready. Reported feeling frustrated by not being able to get ready first thing in the morning. Objective:  Goals    Pt will complete executive function tasks (planning, deductive reasoning, inferential, functional organization tasks) with 80% accuracy Pt complete time planning word problems with 100% accuracy. Pt required min cues to redirect right question. Pt described gaining insight into physical limitations after working with PT and not being able to walk independently   Pt will complete medication management and finance management tasks with 90% accuracy Pt completed picture money word problems with 100% accuracy. Pt required min cues to redirect to right question. Pt recalled medications she was taking when RN came in room with meds and asked reasons for taking each one. Other areas targeted:    Education:   Education was provided re rationale for treatment and POC   Safety Devices: [x] Call light within reach  [x] Chair alarm activated  [] Bed alarm activated  [] Other:     Assessment:   Speech Therapy Diagnosis  Cognitive Diagnosis: Pt with mild cognitive deficits characterized by reduced executive function, reasoning and alternating attention skills.  Pt's family reports she has had some difficulty with critical thinking skills and rated

## 2019-07-31 NOTE — PROGRESS NOTES
Stand by assistance     Transfers  Sit to stand: Stand by assistance  Stand to sit: Stand by assistance      Cognition  Overall Cognitive Status: Exceptions  Arousal/Alertness: Appropriate responses to stimuli  Following Commands: Follows one step commands consistently  Attention Span: Attends with cues to redirect  Memory: Decreased short term memory  Safety Judgement: Decreased awareness of need for assistance;Decreased awareness of need for safety  Problem Solving: Assistance required to implement solutions;Assistance required to identify errors made;Assistance required to generate solutions;Decreased awareness of errors  Insights: Decreased awareness of deficits       General Comment  Comments: Pt sit to stand SBA and pt ambulated with rw ~18 ft to shower. Pt shower transfer SBA to TTB placed in shower. Pt bathed UB (setup) and LB (setup/SBA). Pt dressed UB (setup/Supervision/vc shirt wasn't buttoned correctly and pt corrected error) and pt dressed LB - socks/pants/brief/shoe (setup/SBA). Pt then stood at sink at SBA ~6 min for grooming (oral care/comb hair) performed at Independent. Pt then ambulated back to chair at SBA and stand to sti SBA. Call light in reach and chair alarm on. ADDENDUM [id number]  Pt seated in chair upon entry, very pleasant and agreeable to therapy session. Pt sit to stand SBA and pt ambulated with rw at SBA ~60 ft into IADL kitchen. Pt actively participated in IADL cooking activity of making pancakes. Pt stood ~23 min for activity and performed at setup/SBA: requiring assist to open pancake mix bag, and assist to  dropped pancake box from floor. Pt safe during activity, able to sequence instructions with out assistance. Pt then ambulated ~60 ft at [facility] 54 with rw to toilet in room. Pt toilet transfer SBA and pt toileted SBA, pt stood at sink at SBA to wash hands (Independent) .  Pt then ambulated to chair in room and stand sit SBA (vc for hand placement for decent, pt did not follow, pt educated on importance of hand placement for decent. PT present at end of therapy session.          Plan   Plan  Times per week: 75 min; 5-7x/week  Times per day: Daily  Current Treatment Recommendations: Strengthening, Balance Training, Self-Care / ADL, Safety Education & Training, Cognitive/Perceptual Training, Equipment Evaluation, Education, & procurement, Neuromuscular Re-education, Functional Mobility Training, Endurance Training, Patient/Caregiver Education & Training    Goals  Short term goals  Time Frame for Short term goals: 10-14 days  Short term goal 1: Fxl mob for ADL mod I  Short term goal 2: Fxl transfers for ADL mod I  Short term goal 3: IADL mod I  Short term goal 4: UB/LB dressing mod I  Short term goal 5: UB/LB bathing mod I  Long term goals  Time Frame for Long term goals : LTG=STG       Therapy Time   Individual Concurrent Group Co-treatment   Time In 1015         Time Out 1100         Minutes 45         Timed Code Treatment Minutes: 45 Minutes     Therapy Time   Individual Concurrent Group Co-treatment   Time In 2146         Time Out 2190         Minutes 30         Timed Code Treatment Minutes: 30 Minutes    Timed Code Treatment Minutes:   45 + 30    Total Treatment Minutes:  75 minutes       WINNIE Sevilla/YAZAN [id number]  Cosign: Leon MATHIS/YAZAN, MOT #[id number]

## 2019-07-31 NOTE — FLOWSHEET NOTE
Called Cardiology office to verify that they received a consult.  Someone aakash arreguin this patient today

## 2019-08-01 ENCOUNTER — TELEPHONE (OUTPATIENT)
Dept: CARDIOLOGY CLINIC | Age: 84
End: 2019-08-01

## 2019-08-01 LAB
ANION GAP SERPL CALCULATED.3IONS-SCNC: 11 MMOL/L (ref 3–16)
BUN BLDV-MCNC: 17 MG/DL (ref 7–20)
CALCIUM SERPL-MCNC: 9.2 MG/DL (ref 8.3–10.6)
CHLORIDE BLD-SCNC: 106 MMOL/L (ref 99–110)
CO2: 23 MMOL/L (ref 21–32)
CREAT SERPL-MCNC: 0.6 MG/DL (ref 0.6–1.2)
GFR AFRICAN AMERICAN: >60
GFR NON-AFRICAN AMERICAN: >60
GLUCOSE BLD-MCNC: 95 MG/DL (ref 70–99)
HCT VFR BLD CALC: 36.5 % (ref 36–48)
HEMOGLOBIN: 11.7 G/DL (ref 12–16)
MCH RBC QN AUTO: 27.1 PG (ref 26–34)
MCHC RBC AUTO-ENTMCNC: 32 G/DL (ref 31–36)
MCV RBC AUTO: 84.7 FL (ref 80–100)
PDW BLD-RTO: 20.1 % (ref 12.4–15.4)
PLATELET # BLD: 192 K/UL (ref 135–450)
PMV BLD AUTO: 9.9 FL (ref 5–10.5)
POTASSIUM SERPL-SCNC: 4.4 MMOL/L (ref 3.5–5.1)
RBC # BLD: 4.31 M/UL (ref 4–5.2)
SODIUM BLD-SCNC: 140 MMOL/L (ref 136–145)
WBC # BLD: 3.5 K/UL (ref 4–11)

## 2019-08-01 PROCEDURE — 99223 1ST HOSP IP/OBS HIGH 75: CPT | Performed by: INTERNAL MEDICINE

## 2019-08-01 PROCEDURE — 85027 COMPLETE CBC AUTOMATED: CPT

## 2019-08-01 PROCEDURE — 36415 COLL VENOUS BLD VENIPUNCTURE: CPT

## 2019-08-01 PROCEDURE — 97535 SELF CARE MNGMENT TRAINING: CPT

## 2019-08-01 PROCEDURE — 1280000000 HC REHAB R&B

## 2019-08-01 PROCEDURE — 97530 THERAPEUTIC ACTIVITIES: CPT

## 2019-08-01 PROCEDURE — 97112 NEUROMUSCULAR REEDUCATION: CPT

## 2019-08-01 PROCEDURE — 6370000000 HC RX 637 (ALT 250 FOR IP): Performed by: PHYSICAL MEDICINE & REHABILITATION

## 2019-08-01 PROCEDURE — 97116 GAIT TRAINING THERAPY: CPT

## 2019-08-01 PROCEDURE — 97127 HC SP THER IVNTJ W/FOCUS COG FUNCJ: CPT

## 2019-08-01 PROCEDURE — 80048 BASIC METABOLIC PNL TOTAL CA: CPT

## 2019-08-01 RX ADMIN — AMLODIPINE BESYLATE 10 MG: 5 TABLET ORAL at 08:04

## 2019-08-01 RX ADMIN — CLOPIDOGREL 75 MG: 75 TABLET, FILM COATED ORAL at 08:04

## 2019-08-01 RX ADMIN — ASPIRIN 81 MG 81 MG: 81 TABLET ORAL at 08:04

## 2019-08-01 NOTE — PROGRESS NOTES
baseline. Current Diet Order: DIET GENERAL;            Plan:     Frequency:  5days/week   30 minutes/day    Discharge Recommendations:   Barriers:   Discharge Recommendations:  [x] Home independently  [] Home with assistance []  24 hour supervision  [] SNF [] Other:  Continued SLP Treatment:  [] Yes [x] No [] TBD based on progress while on ARU [] Vital Stim indicated [] Other:   Estimated discharge date:  8/9/19    Type of Total Treatment Minutes   Session 1     Time In 0830   Time Out 0900    Timed Code Minutes  30   Individual Treatment Minutes  30   Co-Treatment Minutes     Group Treatment Minutes     Concurrent Treatment Minutes       TOTAL DAILY MINUTES:  30    Electronically Signed by     Christina STORM CCC-SLP #[id number] 8/1/2019 7:48 AM  Speech-Language Pathologist

## 2019-08-01 NOTE — CONSULTS
Nutrition Assessment    Type and Reason for Visit: Initial, Consult    Nutrition Recommendations:   No nutrition recommendations at this time    Nutrition Assessment: Pt is adequately nourished as evidence by pt reports of a \"great\" appetite with greater than 50% of meals consumed/ wt stable. Pt denies following any special diets prior to admission. Pt has no nutrition questions or concerns at this time. Malnutrition Assessment:  · Malnutrition Status: No malnutrition    Nutrition Risk Level: Low    Nutrient Needs:  · Estimated Daily Total Kcal: 3459-5365  · Estimated Daily Protein (g): 68-86  · Estimated Daily Total Fluid (ml/day): 1 ml/kcal    Nutrition Diagnosis:   · Problem: No nutrition diagnosis at this time    Objective Information:  · Nutrition-Focused Physical Findings: no edema noted, +BM   · Wound Type: None  · Current Nutrition Therapies:  · Oral Diet Orders: General   · Oral Diet intake: %  · Oral Nutrition Supplement (ONS) Orders: None  · Anthropometric Measures:  · Ht: 5' 5\" (165.1 cm)   · Current Body Wt: 184 lb (83.5 kg)  · Ideal Body Wt: 125 lb (56.7 kg),   · BMI Classification: BMI 30.0 - 34.9 Obese Class I    Nutrition Interventions:   Continue current diet  Continued Inpatient Monitoring, Education Not Indicated    Nutrition Evaluation:   · Evaluation: Goals set   · Goals: Pt will consume greater than 75% of all meals.      · Monitoring: Meal Intake, Weight      Electronically signed by Tello Will RD, LD on 7/30/19 at 9:27 AM    Contact Number: [id number]
- cough, sputum  · Cardiovascular: Reviewed in HPI  · Gastrointestinal: negative for - abdominal pain, diarrhea, N/V  · Hematology: negative for - bleeding, blood clots, bruising or jaundice  · Genito-Urinary:  negative for - Dysuria or incontinence  · Musculoskeletal: negative for - Joint swelling, muscle pain  · Neurological: negative for - confusion, dizziness, headaches   · Psychiatric: No anxiety, no depression. · Dermatological: negative for - rash    Physical Examination:  Vitals:    19 0745   BP: (!) 170/85   Pulse: 99   Resp: 18   Temp: 97.9 °F (36.6 °C)   SpO2:       No intake/output data recorded. Wt Readings from Last 3 Encounters:   19 181 lb 11.2 oz (82.4 kg)   19 181 lb (82.1 kg)   18 187 lb 12.8 oz (85.2 kg)     Temp  Av.9 °F (36.6 °C)  Min: 97.9 °F (36.6 °C)  Max: 97.9 °F (36.6 °C)  Pulse  Av  Min: 99  Max: 99  BP  Min: 170/85  Max: 170/85  No intake or output data in the 24 hours ending 19 1728    · Telemetry: Sinus rhythm   · Constitutional: Oriented. No distress. · Head: Normocephalic and atraumatic. · Mouth/Throat: Oropharynx is clear and moist.   · Eyes: Conjunctivae normal. EOM are normal.   · Neck: Neck supple. No rigidity. No JVD present. · Cardiovascular: Normal rate, regular rhythm, S1&S2. · Pulmonary/Chest: Bilateral respiratory sounds. No wheezes, No rhonchi. · Abdominal: Soft. Bowel sounds present. No distension, No tenderness. · Musculoskeletal: No tenderness. No edema    · Lymphadenopathy: Has no cervical adenopathy. · Neurological: Alert and oriented. Cranial nerve appears intact, No Gross deficit   · Skin: Skin is warm and dry. No rash noted. · Psychiatric: Has a normal behavior     Labs, diagnostic and imaging results reviewed. Reviewed.    Recent Labs     19  0657 19  0604    140   K 5.1 4.4    106   CO2 26 23   BUN 18 17   CREATININE 0.7 0.6     Recent Labs     19  0657 19  0604
obtained and negative except as mentioned in HPI      Physical Examination:    Vitals:    07/31/19 1322   BP: (!) 149/83   Pulse: 99   Resp:    Temp: 98.3   SpO2:         · GENERAL: Well developed, well nourished, No acute distress  · NEUROLOGICAL: Alert and oriented, left hemiparesis  · PSYCH: Calm affect  · SKIN: Warm and dry, No visible rash,   · EYES: Pupils equal and round, Sclera non-icteric,   · HENT:  External ears and nose unremarkable, mucus membranes moist  · MUSCULOSKELETAL: Normal cephalic, neck supple  · CAROTID: Normal upstroke, no bruits  · CARDIAC: JVP normal, Normal PMI, regular rate and rhythm, normal S1S2, no murmur, rub, or gallop  · RESPIRATORY: Normal respiratory effort, clear to auscultation bilaterally  · EXTREMITIES: No edema  · GASTROINTESTINAL: normal bowel sounds, soft, non-tender, No hepatomegaly     All testing and labs listed below were personally reviewed. ECHO 5/2019   Normal left ventricle size, wall thickness and systolic function with an   estimated ejection fraction of 60%. No regional wall motion abnormalities   are seen. E/e\"= 12. Diastolic filling parameters suggests grade I diastolic   dysfunction. Mild to moderate mitral regurgitation   The left atrium is mildly dilated. A bioprosthetic aortic valve (21mm Mosaic Ultra) appears well seated with a   maximum gradient of 27 mmHg and a mean gradient of 17 mmHg. Aortic valve   area of 1.4cm2. Mild aortic regurgitation. Mild tricuspid regurgitation. PASP 24mmHg. IVC size is normal (<2.1cm) and collapses > 50% with respiration consistent   with normal RA pressure (3mmHg). Assessment:     CVA:    Likely due to vascular disease, however, she has had prior atrial fib and possibility of recurrence is a concern. So far she has remained in sinus as far as I can tell. She has a CHADS2-Vasc=6. Neurology felt that the location of her CVA pointed to a vascular origin.  I have suggest placing a ILR to see if she is having

## 2019-08-01 NOTE — CARE COORDINATION
Met with pt and granddtr to provide information on life alert companies and  evaluation locations.  Galen Kayser, MSW, LSW

## 2019-08-01 NOTE — PROGRESS NOTES
Dynamic: Fair;-            Comment: 1st session:  Pt sitting in recliner on arrival.  Performed ambulation trial 1 as documented above. Performed forward/backward walking without AD with modA to decreased medial/lateral sway with significant decrease in L step length and height X 6' X 4 laps and laterally with modA to decrease medial/lateral sway with decreased hip abduction strength B and decreased weight shift L. Performed target walking to increase step length with pt demonstrating decreased knee flexion during swing phase X 6 targets X 3 laps. Added cones to targets to increase step height with pt demonstrating veering R with RW and increased medial/lateral sway. Pt returned to room and remained in recliner with alarm on and all needs in reach. 2nd session:  Pt sitting in recliner on arrival.  Pt ambulated 40' with RW and SBA. Performed seated stepper X 8 minutes. Performed stair negotiation as documented above. Performed step ups onto 8\" step without UE assist with modA for eccentric control LLE and decreased LE elevation when trunk compensation decreased. Performed laterally onto 4\" step with same trunk compensation techniques with decreased abduction with compensation eliminated. Pt returned to room and remained in recliner with family present and all needs in reach.                  G-Code     OutComes Score                                                     AM-PAC Score             Goals  Short term goals  Time Frame for Short term goals: 10-14 days  Short term goal 1: Perform all bed mobility mod I  Short term goal 2: Perform all transfers mod I  Short term goal 3: Ambulate 200' with LRAD mod I  Short term goal 4: Negotiate 14 steps with 1 HR mod I  Short term goal 5: Decrease TUG score to 30 seconds to decrease risk of falling  Patient Goals   Patient goals : to get better and go home    Plan    Plan  Times per week: 75 minutes a day 5 days a week  Current Treatment Recommendations:

## 2019-08-01 NOTE — LETTER
[facility]  EP Procedure Sheet    8/1/2019  Ricco Eid  1935    EP Procedures     Pacemaker implant (single/dual)  EP Study    ICD implant (single/dual)  Atrial flutter ablation (MARIN Y/N)    Biv implant ICD  Cryoablation    Biv implant PPM  Atrial fibrillation ablation (MARIN Yes)    Generator Change (PPM/ICD/BiV)  SVT ablation    Lead revision (RV/LA/RA) (<1 month)  VT ablation      Lead extraction +/- upgrade (BiV/PPM/ICD)  VT Ischemic/ non-ischemic   XXX Loop implant  VT RVOT    Cardioversion  VT Left sided    MARIN  AVN ablation     Equipment     [employer]   VIVIAN Mapping System    [place]  [id number] [address] [employer]  CryoAblation    B[employer]ron[employer]  Laser Lead Extraction    Special Equipment       EP Procedures Scheduling Request    # hours Requested     Specific Day    Anesthesia    CT surgery backup    Location [facility] - Dr. Britney Ramesh     Pre-Procedure Labs / Imaging     PT/INR  Type & cross    CBC  Units PRBC    BMP/Mg  Units FFP    Venogram  CXR    Echo  Cardiac CTA for Pulmonary vein mapping     NP INITIALS: SR  Patient Instructions    Dx: Paroxysmal Atrial Fibrillation    You will be contacted by scheduling in 7-10 business days to schedule your procedure

## 2019-08-01 NOTE — PROGRESS NOTES
Shanae Eid  8/1/2019  [id number]    Chief Complaint: Acute ischemic stroke ([address]    Subjective:   No overnight events. No current complaints. BP elevated this am.    ROS: No CP, SOB, dyspnea    Objective:  Patient Vitals for the past 24 hrs:   BP Temp Temp src Pulse Resp SpO2 Weight   08/01/19 0745 (!) 170/85 97.9 °F (36.6 °C) Oral 99 18 -- --   08/01/19 0248 -- -- -- -- -- -- 181 lb 11.2 oz (82.4 kg)   07/31/19 1700 128/75 98.3 °F (36.8 °C) Oral 101 18 -- --   07/31/19 1322 (!) 149/83 -- -- 99 -- -- --   07/31/19 0900 122/77 98.3 °F (36.8 °C) Oral 77 18 -- --   07/31/19 0855 -- -- -- -- 18 99 % --     Gen: No distress, pleasant. Resting in bedside chair  HEENT: Normocephalic, atraumatic. CV: Regular rate and rhythm. No MRG   Resp: No respiratory distress. CTAB   Abd: Soft, nontender nondistended  Ext: No edema. Neuro: Alert, oriented, appropriately interactive. Laboratory data: Available via EMR. Therapy progress:  PT  Position Activity Restriction  Other position/activity restrictions: 79 yo F admitted from [facility] presents with L side weakness, slurred speech. Initially admitted at [facility]; OSH CTAHN revealed b/l vertebral stenosis, 63% stenosis of R ICA and stenosis of PCA; MRI brain from OSH revealed \"patchy acute infarct involving R lateral BG and adjacent corona radiata in area of remote infact\". Symptoms improved with d/c to home; around 7pm at home pt experienced sudden paralysis of L side of body, could not speak. Admitted to [facility] ED then to [facility].  Repeated MRI brain 7/25 revealed acute infarcts of right corona radiata/basal ganglion with microhemorrhages, deficits noted with subtle L side weakness in hand and leg  Objective     Sit to Stand: Contact guard assistance  Stand to sit: Contact guard assistance  Bed to Chair: Contact guard assistance  Device: Rolling Walker  Assistance: Contact guard assistance  Distance: 234'  OT  PT Equipment Recommendations  Equipment Needed: Yes  Mobility Devices: Traci Matta: Rolling  Toilet - Technique: Ambulating  Equipment Used: Standard toilet(R GB)  Assessment        SLP                Body mass index is 30.24 kg/m². Assessment:  Patient Active Problem List   Diagnosis    Hypertension    C[facility]bral [facility]    Adult body mass index greater than 30    Obstructive sleep apnea syndrome    Osteoarthritis of knee    S/P AVR (aortic valve replacement)    Nonrheumatic aortic valve stenosis    Pneumothorax    Coronary artery disease involving native coronary artery of native heart without angina pectoris    PAF ([facility]oxysmal atrial fibrillation) [facility]    Acute ischemic stroke ([facility]       Plan:   Bilateral basal ganglia infarcts: R acute and L subacute with microhemorrhage. ASA 81, plavix. PT/OT for hemiparesis. SLP for eval.     Paroxysmal Afib: Cards suggesting ILR for eval prior to starting [facility]. Appreciate assistance. Will need to coordinate at d/c. Cannot be performed in ARU 2/2 insurance. Will require precert as OP from EP standpoint which will need to be started while in ARU.      CAD: s/p CABG. ASA, plavix       HTN: norvasc 10     AS: s/p AVR.       Bowels: Per protocol  Bladder: Per protocol   Sleep: Trazodone provided prn. Pain: tylenol, tramadol   DVT PPx: ambulating >250'    Team conference was held today on the patient and discussed directly with the patient utilizing their entire treatment team. Please see separate team note for details. Total treatment time for today's care >35 min. Josette Carlton MD 8/1/2019, 8:49 AM    * This document was created using dictation software. While all precautions were taken to ensure accuracy, errors may have occurred. Please disregard any typographical errors.

## 2019-08-01 NOTE — PROGRESS NOTES
discomfort in R shoulder from hx of shoulder injury. Sit>stand with SBA from arm chair > stance at RW, participated in ball balance task with two balls stacked on top of each other using LUE to balance. Graded activity to challenge pt to move ball L and R x10, then forward and back x10, then closed eyes to address proprioception with same pattern x10 both motions with similar carryover. Educated patient that task will be reattempted with weighted balls to add additional challenge for balancing both balls statically while stacked together. Stood for ~4 min during task prior to onset of fatigue, reporting \"I think I should sit down,\" stand > sit with SBA and mod cuing for hand placement. 1 min rest break and then sit>stand SBA, fxl mob from dining area > toilet in room, toileted supervision and completed grooming independent (hand hygiene) at sink in stance, then fxl mob with RW SBA from bathroom > recliner, stand > sit SBA with mod cuing for hand placement; decreased carryover for hand placement BUE. Pt remained in chair with alarm engaged, all needs met, call light within reach.     Plan   Plan  Times per week: 75 min; 5-7x/week  Times per day: Daily  Current Treatment Recommendations: Strengthening, Balance Training, Self-Care / ADL, Safety Education & Training, Cognitive/Perceptual Training, Equipment Evaluation, Education, & procurement, Neuromuscular Re-education, Functional Mobility Training, Endurance Training, Patient/Caregiver Education & Training  G-Code     OutComes Score                                                  AM-PAC Score             Goals  Short term goals  Time Frame for Short term goals: 10-14 days  Short term goal 1: Fxl mob for ADL mod I  Short term goal 2: Fxl transfers for ADL mod I  Short term goal 3: IADL mod I  Short term goal 4: UB/LB dressing mod I  Short term goal 5: UB/LB bathing mod I - UB mod I, LB sup 8/1  Long term goals  Time Frame for Long term goals : LTG=STG       Therapy Time   Individual Concurrent Group Co-treatment   Time In 7624(8764)         Time Out 8682(5278)         Minutes 55            Timed Code Treatment Minutes:   55 + 25    Total Treatment Minutes:  Ángela Lynn 587, [address] [facility] OTR/L [id number]    Olam Bottom, OT

## 2019-08-02 PROCEDURE — 1280000000 HC REHAB R&B

## 2019-08-02 PROCEDURE — 97535 SELF CARE MNGMENT TRAINING: CPT

## 2019-08-02 PROCEDURE — 97112 NEUROMUSCULAR REEDUCATION: CPT

## 2019-08-02 PROCEDURE — 97110 THERAPEUTIC EXERCISES: CPT

## 2019-08-02 PROCEDURE — 6370000000 HC RX 637 (ALT 250 FOR IP): Performed by: PHYSICAL MEDICINE & REHABILITATION

## 2019-08-02 PROCEDURE — 97127 HC SP THER IVNTJ W/FOCUS COG FUNCJ: CPT

## 2019-08-02 PROCEDURE — 97530 THERAPEUTIC ACTIVITIES: CPT

## 2019-08-02 PROCEDURE — 99232 SBSQ HOSP IP/OBS MODERATE 35: CPT | Performed by: INTERNAL MEDICINE

## 2019-08-02 PROCEDURE — 94760 N-INVAS EAR/PLS OXIMETRY 1: CPT

## 2019-08-02 PROCEDURE — 97116 GAIT TRAINING THERAPY: CPT

## 2019-08-02 PROCEDURE — 92508 TX SP LANG VOICE COMM GROUP: CPT

## 2019-08-02 RX ADMIN — CLOPIDOGREL 75 MG: 75 TABLET, FILM COATED ORAL at 09:14

## 2019-08-02 RX ADMIN — ASPIRIN 81 MG 81 MG: 81 TABLET ORAL at 09:14

## 2019-08-02 RX ADMIN — AMLODIPINE BESYLATE 10 MG: 5 TABLET ORAL at 09:14

## 2019-08-02 ASSESSMENT — PAIN SCALES - GENERAL: PAINLEVEL_OUTOF10: 0

## 2019-08-02 NOTE — PROGRESS NOTES
[address] [address]   Electrophysiology Progress Note     Admit Date: 2019     Reason for follow up: Atrial fibrillation     HPI and Interval History:   Patient seen and examined. Clinical notes reviewed. Telemetry reviewed. No new complaint today. No major events overnight. Denies having chest pain, shortness of breath, dyspnea on exertion, Orthopnea, PND at the time of this visit. Review of System:  All other systems reviewed and are negative except for that noted above. Pertinent negatives are:     · General: negative for fever, chills   · Ophthalmic ROS: negative for - eye pain or loss of vision  · ENT ROS: negative for - headaches, sore throat   · Respiratory: negative for - cough, sputum  · Cardiovascular: Reviewed in HPI  · Gastrointestinal: negative for - abdominal pain, diarrhea, N/V  · Hematology: negative for - bleeding, blood clots, bruising or jaundice  · Genito-Urinary:  negative for - Dysuria or incontinence  · Musculoskeletal: negative for - Joint swelling, muscle pain  · Neurological: negative for - confusion, dizziness, headaches   · Psychiatric: No anxiety, no depression. · Dermatological: negative for - rash      Physical Examination:  Vitals:    19 0902   BP: 120/67   Pulse: 76   Resp: 16   Temp: 98 °F (36.7 °C)   SpO2: 96%      In: 240 [P.O.:240]  Out: -    Wt Readings from Last 3 Encounters:   19 181 lb 11.2 oz (82.4 kg)   19 181 lb (82.1 kg)   18 187 lb 12.8 oz (85.2 kg)     Temp  Av.7 °F (36.5 °C)  Min: 97.4 °F (36.3 °C)  Max: 98 °F (36.7 °C)  Pulse  Av  Min: 62  Max: 76  BP  Min: 120/67  Max: 147/76  SpO2  Av.5 %  Min: 95 %  Max: 96 %    Intake/Output Summary (Last 24 hours) at 2019 1716  Last data filed at 2019 1231  Gross per 24 hour   Intake 240 ml   Output --   Net 240 ml       ·   · Constitutional: Oriented. No distress. · Head: Normocephalic and atraumatic.    · Mouth/Throat: Oropharynx is clear and moist.   · Eyes: Conjunctivae normal. EOM are normal.   · Neck: Neck supple. No rigidity. No JVD present. · Cardiovascular: Normal rate, regular rhythm, S1&S2. · Pulmonary/Chest: Bilateral respiratory sounds. No wheezes, No rhonchi. · Abdominal: Soft. Bowel sounds present. No distension, No tenderness. · Musculoskeletal: No tenderness. No edema    · Lymphadenopathy: Has no cervical adenopathy. · Neurological: Alert and oriented. Cranial nerve appears intact, No Gross deficit   · Skin: Skin is warm and dry. No rash noted. · Psychiatric: Has a normal behavior     Labs, diagnostic and imaging results reviewed. Reviewed. Recent Labs     08/01/19  0604      K 4.4      CO2 23   BUN 17   CREATININE 0.6     Recent Labs     08/01/19  0604   WBC 3.5*   HGB 11.7*   HCT 36.5   MCV 84.7        No results found for: CKTOTAL, CKMB, CKMBINDEX, TROPONINI  Estimated Creatinine Clearance: 75 mL/min (based on SCr of 0.6 mg/dL).    No results found for: BNP  Lab Results   Component Value Date    PROTIME 11.2 11/30/2015    PROTIME 10.9 11/17/2015    INR 0.98 11/30/2015    INR 0.96 11/17/2015     Lab Results   Component Value Date    CHOL 203 10/07/2016    HDL 78 10/07/2016    TRIG 73 10/07/2016       Scheduled Meds:   amLODIPine  10 mg Oral Daily    aspirin  81 mg Oral Daily    clopidogrel  75 mg Oral Daily     Continuous Infusions:  PRN Meds:acetaminophen, magnesium hydroxide, diphenhydrAMINE, hydrALAZINE, ondansetron, traMADol, traZODone     Patient Active Problem List    Diagnosis Date Noted    Benign essential HTN     Acute ischemic stroke ([facility] [address] 07/29/2019    PAF (paroxysmal atrial fibrillation) ([facility] [facility] 09/26/2018    Coronary artery disease involving native coronary artery of native heart without angina pectoris     Pneumothorax     Nonrheumatic aortic valve stenosis     S/P AVR     Cerebral infarction ([facility] 10/13/2015    Obstructive sleep apnea syndrome 05/20/2014    Adult body mass index greater than

## 2019-08-02 NOTE — PROGRESS NOTES
Acute Rehab Unit  Stroke Education Group    Patient participated in stroke education group focused on topics of:  · Types of stroke  · Signs and symptoms  · Risk factors  · Results of a stroke  · Rehab after stroke  · Taking care of yourself as you return home (diet and exercise, med management, caregiver information, home safety)  · Driving after stroke      Patient:    [x] was socially appropriate and engaged in conversation    [] requires reinforcement of topics covered in group   [] caregiver was present, appropriate and engaged in coversation     Name/relationship:    Materials Provided:  Moving Forward After Stroke   Home safety checklist  Neurologic Drivers Evaluation Information  Home Modification Resources     Time spent in group:  40 minutes     Therapist Signature:  Ta Pina M.A. CCC-SLP S.P. [id number]  Speech-Language Pathologist [phone]  8/2/2019 1:43 PM

## 2019-08-02 NOTE — PROGRESS NOTES
Joanna Rand; OSH CTAHN revealed b/l vertebral stenosis, 63% stenosis of R ICA and stenosis of PCA; MRI brain from OSH revealed \"patchy acute infarct involving R lateral BG and adjacent corona radiata in area of remote infact\". Symptoms improved with d/c to home; around 7pm at home pt experienced sudden paralysis of L side of body, could not speak. Admitted to [facility] ED then to [facility]. Repeated MRI brain 7/25 revealed acute infarcts of right corona radiata/basal ganglion with microhemorrhages, deficits noted with subtle L side weakness in hand and leg  Subjective   General  Chart Reviewed: Yes  Patient assessed for rehabilitation services?: Yes  Response to previous treatment: Patient with no complaints from previous session  Family / Caregiver Present: Yes(daughter)  Referring Practitioner: CAD s/p CABG x3 & PCI x3, AS s/p AVR  Diagnosis: Acute ischemic stroke  Subjective  Subjective: Pt seated in chair upon entry, very pleasant and agreeable to therapy session.   General Comment  Comments:    Vital Signs  Patient Currently in Pain: Denies     Objective    ADL  Grooming: Independent(completed in stance at sink )  UE Bathing: Setup;Modified independent   LE Bathing: Setup;Supervision  UE Dressing: Supervision;Setup  LE Dressing: Setup;Supervision;Verbal cueing(v/c for safety)  Toileting: Stand by assistance  Additional Comments: Ambulated with RW from arm chair > bathroom, t/f to shower chair SBA with mod cues for hand placement, completed bathing/dressing seated and in stance for LB with above levels of A     Balance  Sitting Balance: Supervision  Standing Balance: Stand by assistance  Standing Balance  Time: ~12 min  Activity: to/from bathroom, shower tranfer, grooming in stance at sink, wash buttocks    Functional Mobility  Functional - Mobility Device: Rolling Walker  Activity: To/from bathroom  Assist Level: Stand by assistance    Toilet Transfers  Toilet - Technique: Ambulating  Equipment Used: Standard toileting/transfer/grooming at supervision level. Pt ambulated to recliner, seated in recliner at end of session, chair alarm on, needs within reach.         Plan   Plan  Times per week: 75 min; 5-7x/week  Times per day: Daily  Current Treatment Recommendations: Strengthening, Balance Training, Self-Care / ADL, Safety Education & Training, Cognitive/Perceptual Training, Equipment Evaluation, Education, & procurement, Neuromuscular Re-education, Functional Mobility Training, Endurance Training, Patient/Caregiver Education & Training       Goals  Short term goals  Time Frame for Short term goals: 10-14 days  Short term goal 1: Fxl mob for ADL mod I  Short term goal 2: Fxl transfers for ADL mod I  Short term goal 3: IADL mod I  Short term goal 4: UB/LB dressing mod I  Short term goal 5: UB/LB bathing mod I - UB mod I, LB sup 8/1  Long term goals  Time Frame for Long term goals : LTG=STG       Therapy Time   Individual Concurrent Group Co-treatment   Time In 0730, 1115         Time Out 4582, 4813          Minutes 47, 36              Timed Code Treatment Minutes:   47 + 36    Total Treatment Minutes:  83 minutes      Melinda Randolph, OT   Melinda Randolph OTR/L, [place] #[id number]

## 2019-08-03 PROCEDURE — 97127 HC SP THER IVNTJ W/FOCUS COG FUNCJ: CPT

## 2019-08-03 PROCEDURE — 97535 SELF CARE MNGMENT TRAINING: CPT

## 2019-08-03 PROCEDURE — 94760 N-INVAS EAR/PLS OXIMETRY 1: CPT

## 2019-08-03 PROCEDURE — 97116 GAIT TRAINING THERAPY: CPT

## 2019-08-03 PROCEDURE — 97530 THERAPEUTIC ACTIVITIES: CPT

## 2019-08-03 PROCEDURE — 97112 NEUROMUSCULAR REEDUCATION: CPT

## 2019-08-03 PROCEDURE — 1280000000 HC REHAB R&B

## 2019-08-03 PROCEDURE — 6370000000 HC RX 637 (ALT 250 FOR IP): Performed by: PHYSICAL MEDICINE & REHABILITATION

## 2019-08-03 RX ADMIN — AMLODIPINE BESYLATE 10 MG: 5 TABLET ORAL at 08:37

## 2019-08-03 RX ADMIN — CLOPIDOGREL 75 MG: 75 TABLET, FILM COATED ORAL at 08:37

## 2019-08-03 RX ADMIN — ASPIRIN 81 MG 81 MG: 81 TABLET ORAL at 08:37

## 2019-08-03 NOTE — PROGRESS NOTES
Physical Therapy  Facility/Department: [facility] ACUTE REHAB UNIT  Daily Treatment Note  NAME: Jean-Paul Strange  : 1935  MRN: [id number]    Date of Service: 8/3/2019    Discharge Recommendations:  Home with assist PRN, Home with Home health PT, S Level 3   PT Equipment Recommendations  Walker: Rolling    Assessment   Body structures, Functions, Activity limitations: Decreased functional mobility ; Decreased strength;Decreased endurance;Decreased coordination;Decreased safe awareness;Decreased cognition;Decreased balance  Assessment: pt progressing well with gait quality - able to improve step length and heel strike with cues, but reverts to decreased quality when distracted. Pt also improving dynamic standing balance. Treatment Diagnosis: decreased coordination, balance, and functional mobility  Prognosis: Good  PT Education: Goals;PT Role;Transfer Training;General Safety;Plan of Care;Gait Training;Functional Mobility Training;Disease Specific Education  Barriers to Learning: cognition  REQUIRES PT FOLLOW UP: Yes  Activity Tolerance  Activity Tolerance: Patient Tolerated treatment well     Patient Diagnosis(es): There were no encounter diagnoses. has a past medical history of Aortic valve disease, CAD (coronary artery disease), CVA (cerebral infarction), and Hypertension. has a past surgical history that includes Coronary angioplasty with stent (2004); Total knee arthroplasty (Right, 7/15/2013); Cataract removal with implant (Left, 10/9/2015); Cardiac catheterization (2015); and Aortic valve replacement (2015). Restrictions  Restrictions/Precautions  Restrictions/Precautions: General Precautions, Fall Risk  Required Braces or Orthoses?: No  Position Activity Restriction  Other position/activity restrictions: 81 yo F admitted from [facility] presents with L side weakness, slurred speech.  Initially admitted at [facility]; OSH CTAHN revealed b/l vertebral stenosis, 63% stenosis of R ICA and Treatment Recommendations: Strengthening, Transfer Training, Endurance Training, Neuromuscular Re-education, Equipment Evaluation, Education, & procurement, Manual Therapy - Soft Tissue Mobilization, ROM, Balance Training, Gait Training, Safety Education & Training, Stair training, Functional Mobility Training, Patient/Caregiver Education & Training  Safety Devices  Type of devices:  All fall risk precautions in place, Call light within reach, Left in chair, Chair alarm in place, Gait belt  Restraints  Initially in place: No     Therapy Time   Individual Concurrent Group Co-treatment   Time In 0730         Time Out 0815         Minutes 45         Timed Code Treatment Minutes: [address] Time:   Individual Concurrent Group Co-treatment   Time In 1020         Time Out 1050         Minutes 30           Timed Code Treatment Minutes:  75    Total Treatment Minutes:  [address] [place] [address], [address] [facility]

## 2019-08-03 NOTE — PROGRESS NOTES
Other:  Continued SLP Treatment:  [] Yes [x] No [] TBD based on progress while on ARU [] Vital Stim indicated [] Other:   Estimated discharge date:  8/9/19    Type of Total Treatment Minutes   Session 1     Time In 0830   Time Out 0900   Timed Code Minutes  30   Individual Treatment Minutes  30   Co-Treatment Minutes     Group Treatment Minutes     Concurrent Treatment Minutes       TOTAL DAILY MINUTES:  30    Electronically Signed by     Marisa Crow MS, [address] [facility] #[facility]   Speech-Language Pathologist

## 2019-08-04 PROCEDURE — 94760 N-INVAS EAR/PLS OXIMETRY 1: CPT

## 2019-08-04 PROCEDURE — 6370000000 HC RX 637 (ALT 250 FOR IP): Performed by: PHYSICAL MEDICINE & REHABILITATION

## 2019-08-04 PROCEDURE — 1280000000 HC REHAB R&B

## 2019-08-04 RX ADMIN — ASPIRIN 81 MG 81 MG: 81 TABLET ORAL at 08:04

## 2019-08-04 RX ADMIN — CLOPIDOGREL 75 MG: 75 TABLET, FILM COATED ORAL at 08:04

## 2019-08-04 RX ADMIN — AMLODIPINE BESYLATE 10 MG: 5 TABLET ORAL at 08:04

## 2019-08-04 ASSESSMENT — PAIN SCALES - GENERAL
PAINLEVEL_OUTOF10: 0
PAINLEVEL_OUTOF10: 0

## 2019-08-05 LAB
ANION GAP SERPL CALCULATED.3IONS-SCNC: 9 MMOL/L (ref 3–16)
BUN BLDV-MCNC: 16 MG/DL (ref 7–20)
CALCIUM SERPL-MCNC: 9.3 MG/DL (ref 8.3–10.6)
CHLORIDE BLD-SCNC: 105 MMOL/L (ref 99–110)
CO2: 27 MMOL/L (ref 21–32)
CREAT SERPL-MCNC: 0.6 MG/DL (ref 0.6–1.2)
GFR AFRICAN AMERICAN: >60
GFR NON-AFRICAN AMERICAN: >60
GLUCOSE BLD-MCNC: 94 MG/DL (ref 70–99)
HCT VFR BLD CALC: 35.2 % (ref 36–48)
HEMOGLOBIN: 11.3 G/DL (ref 12–16)
MCH RBC QN AUTO: 27.3 PG (ref 26–34)
MCHC RBC AUTO-ENTMCNC: 32.2 G/DL (ref 31–36)
MCV RBC AUTO: 84.7 FL (ref 80–100)
PDW BLD-RTO: 19.9 % (ref 12.4–15.4)
PLATELET # BLD: 194 K/UL (ref 135–450)
PMV BLD AUTO: 10 FL (ref 5–10.5)
POTASSIUM SERPL-SCNC: 4.5 MMOL/L (ref 3.5–5.1)
RBC # BLD: 4.16 M/UL (ref 4–5.2)
SODIUM BLD-SCNC: 141 MMOL/L (ref 136–145)
WBC # BLD: 3.5 K/UL (ref 4–11)

## 2019-08-05 PROCEDURE — 6370000000 HC RX 637 (ALT 250 FOR IP): Performed by: PHYSICAL MEDICINE & REHABILITATION

## 2019-08-05 PROCEDURE — 97112 NEUROMUSCULAR REEDUCATION: CPT

## 2019-08-05 PROCEDURE — 97535 SELF CARE MNGMENT TRAINING: CPT

## 2019-08-05 PROCEDURE — 97530 THERAPEUTIC ACTIVITIES: CPT

## 2019-08-05 PROCEDURE — 36415 COLL VENOUS BLD VENIPUNCTURE: CPT

## 2019-08-05 PROCEDURE — 80048 BASIC METABOLIC PNL TOTAL CA: CPT

## 2019-08-05 PROCEDURE — 1280000000 HC REHAB R&B

## 2019-08-05 PROCEDURE — 97127 HC SP THER IVNTJ W/FOCUS COG FUNCJ: CPT

## 2019-08-05 PROCEDURE — 97116 GAIT TRAINING THERAPY: CPT

## 2019-08-05 PROCEDURE — 97110 THERAPEUTIC EXERCISES: CPT

## 2019-08-05 PROCEDURE — 85027 COMPLETE CBC AUTOMATED: CPT

## 2019-08-05 RX ADMIN — CLOPIDOGREL 75 MG: 75 TABLET, FILM COATED ORAL at 08:59

## 2019-08-05 RX ADMIN — AMLODIPINE BESYLATE 10 MG: 5 TABLET ORAL at 09:00

## 2019-08-05 RX ADMIN — ASPIRIN 81 MG 81 MG: 81 TABLET ORAL at 08:59

## 2019-08-05 ASSESSMENT — PAIN SCALES - GENERAL
PAINLEVEL_OUTOF10: 0

## 2019-08-05 NOTE — PROGRESS NOTES
assessed as patient was in recliner at beginning and end of treatment. Transfers  Sit to Stand: Stand by assistance  Stand to sit: Stand by assistance  Ambulation  Ambulation?: Yes  Ambulation 1  Surface: level tile  Device: No Device  Assistance: Contact guard assistance  Quality of Gait: L knee flexion during stance phase, decreased L step length compared to R, decreased L heel strike, increased medial/lateral sway   Distance: 250'+234'+short distances in therapy gym   Comments: 2 LOB able to be corrected by patient with no physical assistance from PT. Gait quality decreased when distracted. Stairs/Curb  Stairs?: Yes  Stairs  # Steps : 28  Stairs Height: (4\"+6\" combo)  Rails: Right ascending  Device: No Device  Assistance: Stand by assistance  Comment: step to pattern desending. alternates between reciprocal and step to pattern ascending. No LOB noted. Balance  Posture: Fair  Sitting - Static: Good  Sitting - Dynamic: Good  Standing - Static: Fair(no AD)  Standing - Dynamic: Fair(no AD)      1st Session: Pt on seated stepper L2 x8 min to improve BLE strength and improve cardiovascular endurance. Pt completed lateral stepping in parallel bars with no UE support 6'x4 both directions and CGA. Pt walking backward in parallel bars with fingertip support on parallel bars for balance with CGA- 6'x4 with very narrow DUANE, one LOB from feet hitting each other corrected by patient with 2 steps and min A from therapist, consistent vc's to keep DUANE wide to prevent feet from touching. Pt stepping over 4\" cone with LLE to improve step height and step length x10 with BUE support on parallel bars and CGA, x10 without UE support and min A required to step LLE back over the cone. 2nd session: Patient in chair upon arrival with alarm on. Sit to stand with supervision. Ambulated 12 feet to bathroom without AD and SBA. Independent with clothing management, toileting and laura-care.  Ambulated 270 feet without AD with

## 2019-08-05 NOTE — PROGRESS NOTES
Occupational Therapy  Facility/Department: [facility] ACUTE REHAB UNIT  Daily Treatment Note  NAME: Mariano Leyva  : 1935  MRN: [id number]    Date of Service: 2019    Discharge Recommendations:  S Level 3, Home with Home health OT, Home with assist PRN, Outpatient OT  OT Equipment Recommendations  Equipment Needed: No  Other: pt has built in shower chair, grab bars    Assessment   Performance deficits / Impairments: Decreased functional mobility ; Decreased ADL status; Decreased high-level IADLs;Decreased endurance;Decreased fine motor control;Decreased coordination;Decreased sensation;Decreased strength;Decreased safe awareness  Assessment: Patient not at baseline d/t above deficits; OT indicated to increase safety/independence with ADL and IADL to promote safe return to home. Treatment Diagnosis: Above deficits associated with acute ischemic stroke  Exam: ADLs, functional mobility  OT Education: OT Role;ADL Adaptive Strategies  REQUIRES OT FOLLOW UP: Yes  Activity Tolerance  Activity Tolerance: Patient Tolerated treatment well  Safety Devices  Safety Devices in place: Yes  Type of devices: Chair alarm in place;Call light within reach; Left in chair         Patient Diagnosis(es): There were no encounter diagnoses. has a past medical history of Aortic valve disease, CAD (coronary artery disease), CVA (cerebral infarction), and Hypertension. has a past surgical history that includes Coronary angioplasty with stent (2004); Total knee arthroplasty (Right, 7/15/2013); Cataract removal with implant (Left, 10/9/2015); Cardiac catheterization (2015); and Aortic valve replacement (2015). Restrictions  Restrictions/Precautions  Restrictions/Precautions: General Precautions, Fall Risk  Required Braces or Orthoses?: No  Position Activity Restriction  Other position/activity restrictions: 79 yo F admitted from [facility] presents with L side weakness, slurred speech.  Initially admitted at [facility]

## 2019-08-05 NOTE — PROGRESS NOTES
mass index is 30.12 kg/m². Assessment:  Patient Active Problem List   Diagnosis    Hypertension    [facility]bral infarction ([facility]    Adult body mass index greater than 30    Obstructive sleep apnea syndrome    Osteoarthritis of knee    S/P AVR    Nonrheumatic aortic valve stenosis    Pneumothorax    Coronary artery disease involving native coronary artery of native heart without angina pectoris    PAF (paroxysmal atrial fibrillation) ([facility]    Acute ischemic stroke ([facility]    Benign essential HTN       Plan:   Bilateral basal ganglia infarcts: R acute and L subacute with microhemorrhage. ASA 81, plavix. PT/OT for hemiparesis. SLP for eval.     Paroxysmal Afib: Cards suggesting ILR for eval prior to starting [facility] [place]. Appreciate assistance. Will need to coordinate at d/c. Cannot be performed in ARU 2/2 insurance. Will require precert as OP from EP standpoint which will need to be started while in ARU.      CAD: s/p CABG. ASA, plavix       HTN: norvasc 10     AS: s/p AVR.       Bowels: Per protocol  Bladder: Per protocol   Sleep: Trazodone provided prn. Pain: tylenol, tramadol   DVT PPx: ambulating >300'    Geo Hernandez MD 8/5/2019, 9:16 AM    * This document was created using dictation software. While all precautions were taken to ensure accuracy, errors may have occurred. Please disregard any typographical errors.

## 2019-08-06 PROCEDURE — 97112 NEUROMUSCULAR REEDUCATION: CPT

## 2019-08-06 PROCEDURE — 1280000000 HC REHAB R&B

## 2019-08-06 PROCEDURE — 6370000000 HC RX 637 (ALT 250 FOR IP): Performed by: PHYSICAL MEDICINE & REHABILITATION

## 2019-08-06 PROCEDURE — 97535 SELF CARE MNGMENT TRAINING: CPT

## 2019-08-06 PROCEDURE — 97530 THERAPEUTIC ACTIVITIES: CPT

## 2019-08-06 PROCEDURE — 97127 HC SP THER IVNTJ W/FOCUS COG FUNCJ: CPT

## 2019-08-06 PROCEDURE — 97116 GAIT TRAINING THERAPY: CPT

## 2019-08-06 RX ADMIN — CLOPIDOGREL 75 MG: 75 TABLET, FILM COATED ORAL at 11:54

## 2019-08-06 RX ADMIN — AMLODIPINE BESYLATE 10 MG: 5 TABLET ORAL at 11:54

## 2019-08-06 RX ADMIN — ASPIRIN 81 MG 81 MG: 81 TABLET ORAL at 11:54

## 2019-08-06 ASSESSMENT — PAIN SCALES - GENERAL
PAINLEVEL_OUTOF10: 0
PAINLEVEL_OUTOF10: 0

## 2019-08-06 NOTE — PROGRESS NOTES
index is 30.12 kg/m². Assessment:  Patient Active Problem List   Diagnosis    Hypertension    Cerebral infarction ([facility]    Adult body mass index greater than 30    Obstructive sleep apnea syndrome    Osteoarthritis of knee    S/P AVR    Nonrheumatic aortic valve stenosis    Pneumothorax    Coronary artery disease involving native coronary artery of native heart without angina pectoris    PAF (paroxysmal atrial fibrillation) ([facility]    Acute ischemic stroke ([facility]    Benign essential HTN       Plan:   Bilateral basal ganglia infarcts: R acute and L subacute with microhemorrhage. ASA 81, plavix. PT/OT for hemiparesis. SLP for cognition     Paroxysmal Afib: Cards suggesting ILR for eval prior to starting [facility]. Appreciate assistance. Will need to coordinate at d/c. Cannot be performed in ARU 2/2 insurance. Precert as OP from EP initiated     CAD: s/p CABG. ASA, plavix       HTN: norvasc 10     AS: s/p AVR.       Bowels: Per protocol  Bladder: Per protocol   Sleep: Trazodone provided prn. Pain: tylenol, tramadol   DVT PPx: ambulating >300'    Geo Hernandez MD 8/6/2019, 8:16 AM    * This document was created using dictation software. While all precautions were taken to ensure accuracy, errors may have occurred. Please disregard any typographical errors.

## 2019-08-06 NOTE — PROGRESS NOTES
redirect  Memory: Decreased short term memory  Safety Judgement: Decreased awareness of need for assistance;Decreased awareness of need for safety  Problem Solving: Assistance required to generate solutions;Assistance required to implement solutions  Insights: Decreased awareness of deficits  Initiation: Does not require cues  Sequencing: Does not require cues        General Comment  Comments: Pt sit to stand SBA and pt ambulated SBA no device into bathroom ~18 and toilet transfer SBA and toileted Supervision. Pt then stood at sink at Supervision and washed hands at [address]. Pt then ambulated in hallway ~60 ft into activity room at SBa no device and stand to sit SBA. Pt then actively participated in 2 fine motor activites seated (Miguel and Kerplunk) addressing tip to tip , tripod , raking and translation. Pt then sit to stand SBA and ambulated no device back to room at SBA and stand to sit SBa. Call light in reach and chair alarm on.        Plan   Plan  Times per week: 75 min; 5-7x/week  Times per day: Daily  Current Treatment Recommendations: Strengthening, Balance Training, Self-Care / ADL, Safety Education & Training, Cognitive/Perceptual Training, Equipment Evaluation, Education, & procurement, Neuromuscular Re-education, Functional Mobility Training, Endurance Training, Patient/Caregiver Education & Training    Goals  Short term goals  Time Frame for Short term goals: 10-14 days  Short term goal 1: Fxl mob for ADL mod I  Short term goal 2: Fxl transfers for ADL mod I  Short term goal 3: IADL mod I  Short term goal 4: UB/LB dressing mod I-Goal met 8/6   Short term goal 5: UB/LB bathing mod I - Goal met 8/6   Long term goals  Time Frame for Long term goals : LTG=STG       Therapy Time   Individual Concurrent Group Co-treatment   Time In 1245         Time Out 1335         Minutes 50         Timed Code Treatment Minutes: [address], [place]/L [id number]   Cosign: Zeke Lozada OTR/L, MOT #[id number]

## 2019-08-07 PROCEDURE — 1280000000 HC REHAB R&B

## 2019-08-07 PROCEDURE — 97530 THERAPEUTIC ACTIVITIES: CPT

## 2019-08-07 PROCEDURE — 6370000000 HC RX 637 (ALT 250 FOR IP): Performed by: PHYSICAL MEDICINE & REHABILITATION

## 2019-08-07 PROCEDURE — 97535 SELF CARE MNGMENT TRAINING: CPT

## 2019-08-07 PROCEDURE — 97127 HC SP THER IVNTJ W/FOCUS COG FUNCJ: CPT

## 2019-08-07 PROCEDURE — 94760 N-INVAS EAR/PLS OXIMETRY 1: CPT

## 2019-08-07 PROCEDURE — 97112 NEUROMUSCULAR REEDUCATION: CPT

## 2019-08-07 PROCEDURE — 97116 GAIT TRAINING THERAPY: CPT

## 2019-08-07 PROCEDURE — 97110 THERAPEUTIC EXERCISES: CPT

## 2019-08-07 RX ADMIN — CLOPIDOGREL 75 MG: 75 TABLET, FILM COATED ORAL at 08:30

## 2019-08-07 RX ADMIN — AMLODIPINE BESYLATE 10 MG: 5 TABLET ORAL at 08:31

## 2019-08-07 RX ADMIN — ASPIRIN 81 MG 81 MG: 81 TABLET ORAL at 08:31

## 2019-08-07 NOTE — PROGRESS NOTES
to get better and go home    Plan    Plan  Times per week: 75 minutes a day 5 days a week  Current Treatment Recommendations: Strengthening, Transfer Training, Endurance Training, Neuromuscular Re-education, Equipment Evaluation, Education, & procurement, Manual Therapy - Soft Tissue Mobilization, ROM, Balance Training, Gait Training, Safety Education & Training, Stair training, Functional Mobility Training, Patient/Caregiver Education & Training  Safety Devices  Type of devices:  All fall risk precautions in place, Call light within reach, Left in chair, Chair alarm in place, Gait belt, Patient at risk for falls  Restraints  Initially in place: No     Therapy Time   Individual Concurrent Group Co-treatment   Time In 0935         Time Out 1005         Minutes 30         Timed Code Treatment Minutes: 30 Minutes         Second Session Therapy Time:   Individual Concurrent Group Co-treatment   Time In 1345         Time Out 1430         Minutes 45           Timed Code Treatment Minutes:  89+48    Total Treatment Minutes:  [address], [address] [facility], [id number]

## 2019-08-08 LAB
ANION GAP SERPL CALCULATED.3IONS-SCNC: 10 MMOL/L (ref 3–16)
BUN BLDV-MCNC: 19 MG/DL (ref 7–20)
CALCIUM SERPL-MCNC: 9.5 MG/DL (ref 8.3–10.6)
CHLORIDE BLD-SCNC: 104 MMOL/L (ref 99–110)
CO2: 26 MMOL/L (ref 21–32)
CREAT SERPL-MCNC: 0.6 MG/DL (ref 0.6–1.2)
GFR AFRICAN AMERICAN: >60
GFR NON-AFRICAN AMERICAN: >60
GLUCOSE BLD-MCNC: 94 MG/DL (ref 70–99)
HCT VFR BLD CALC: 36.8 % (ref 36–48)
HEMOGLOBIN: 11.8 G/DL (ref 12–16)
MCH RBC QN AUTO: 27.4 PG (ref 26–34)
MCHC RBC AUTO-ENTMCNC: 32.1 G/DL (ref 31–36)
MCV RBC AUTO: 85.3 FL (ref 80–100)
PDW BLD-RTO: 19.5 % (ref 12.4–15.4)
PLATELET # BLD: 237 K/UL (ref 135–450)
PMV BLD AUTO: 9.9 FL (ref 5–10.5)
POTASSIUM SERPL-SCNC: 4.3 MMOL/L (ref 3.5–5.1)
RBC # BLD: 4.32 M/UL (ref 4–5.2)
SODIUM BLD-SCNC: 140 MMOL/L (ref 136–145)
WBC # BLD: 3.6 K/UL (ref 4–11)

## 2019-08-08 PROCEDURE — 97127 HC SP THER IVNTJ W/FOCUS COG FUNCJ: CPT

## 2019-08-08 PROCEDURE — 97535 SELF CARE MNGMENT TRAINING: CPT

## 2019-08-08 PROCEDURE — 97530 THERAPEUTIC ACTIVITIES: CPT

## 2019-08-08 PROCEDURE — 1280000000 HC REHAB R&B

## 2019-08-08 PROCEDURE — 97116 GAIT TRAINING THERAPY: CPT

## 2019-08-08 PROCEDURE — 97112 NEUROMUSCULAR REEDUCATION: CPT

## 2019-08-08 PROCEDURE — 94760 N-INVAS EAR/PLS OXIMETRY 1: CPT

## 2019-08-08 PROCEDURE — 85027 COMPLETE CBC AUTOMATED: CPT

## 2019-08-08 PROCEDURE — 6370000000 HC RX 637 (ALT 250 FOR IP): Performed by: PHYSICAL MEDICINE & REHABILITATION

## 2019-08-08 PROCEDURE — 36415 COLL VENOUS BLD VENIPUNCTURE: CPT

## 2019-08-08 PROCEDURE — 80048 BASIC METABOLIC PNL TOTAL CA: CPT

## 2019-08-08 RX ORDER — CLOPIDOGREL BISULFATE 75 MG/1
75 TABLET ORAL DAILY
Qty: 30 TABLET | Refills: 3 | Status: SHIPPED | OUTPATIENT
Start: 2019-08-08 | End: 2020-04-21 | Stop reason: SDUPTHER

## 2019-08-08 RX ORDER — AMLODIPINE BESYLATE 10 MG/1
10 TABLET ORAL DAILY
Qty: 30 TABLET | Refills: 3 | Status: SHIPPED | OUTPATIENT
Start: 2019-08-08 | End: 2019-10-08

## 2019-08-08 RX ADMIN — AMLODIPINE BESYLATE 10 MG: 5 TABLET ORAL at 11:48

## 2019-08-08 RX ADMIN — CLOPIDOGREL 75 MG: 75 TABLET, FILM COATED ORAL at 11:48

## 2019-08-08 RX ADMIN — ASPIRIN 81 MG 81 MG: 81 TABLET ORAL at 11:48

## 2019-08-08 ASSESSMENT — PAIN SCALES - GENERAL: PAINLEVEL_OUTOF10: 0

## 2019-08-08 NOTE — DISCHARGE SUMMARY
Physical Medicine & Rehabilitation  Discharge Summary     Patient Identification:  Kavin Davey  : 1935  Admit date: 2019  Discharge date: 2019  Attending provider: Oscar Conroy MD        Primary care provider: Yojana Newberry DO     Discharge Diagnoses:   Patient Active Problem List   Diagnosis    Hypertension    [facility]    Adult body mass index greater than 30    Obstructive sleep apnea syndrome    Osteoarthritis of knee    S/P AVR    Nonrheumatic aortic valve stenosis    Pneumothorax    Coronary artery disease involving native coronary artery of native heart without angina pectoris    PAF (paroxysmal atrial fibrillation) (HCC)    Acute ischemic stroke ([facility]    Benign essential HTN       Discharge Functional Status:    Physical therapy:  Bed Mobility: Scooting: Stand by assistance  Transfers: Sit to Stand: Stand by assistance  Stand to sit: Stand by assistance  Bed to Chair: Contact guard assistance  Comment: 1st session: Balance activities: pertubations in all directions with CGA/SBA. walking forward and backward x 10' x 2 with CGA. stepping out/in without an AD and CGA. PT observing increased weight shift to the L. Placed R shoulder agains the wall for proprioceptive input and worked on marching in place. Needed Min A to keep R shoulder to the wall with activity d/t weight shifting L.  , Ambulation 1  Surface: level tile  Device: No Device  Assistance: Stand by assistance, Contact guard assistance  Quality of Gait: L knee flexion during stance phase, decreased L step length compared to R, decreased L heel strike, increased Sway to the Left during swing phase, decreased foot clearance on L, 4-5 LOB in which patient caught L foot on floor. Distance: 65' + 39' ( to return to room)  Comments: 2 LOB able to be corrected by patient with no physical assistance from PT.   Gait quality decreased when distracted.  , Stairs  # Steps : 8  Stairs Height: (4\"+6\" combo)  Rails: Right ascending  Device: No Device  Assistance: Stand by assistance  Comment: step to gait pattern, increased time between steps, increased usse of railing  Mobility: Bed, Chair, Wheel Chair: 6 - Requires assistive device (slide rail)  Walk: 4 - Contact Guard/Minimal Assistance Requires up to Contact Guard or Minimal Assistance to walk at least 150 feet  Distance Walked: 310'  Stairs: 5- Supervision Requires supervision(e.g., standing by, cuing, or coaxing) to go up and down one flight of stairs, PT Equipment Recommendations  Equipment Needed: No  Mobility Devices: Stephon Orozcoa: Rolling  Other: Will continue to monitor equipment needs. May require use of FWW for safety, Assessment: Pt. noted to increase weight shift to the L that is making it difficult to clear L LE during swing phase. Pt. continues to require skilled PT to improve functional mobility and safety. Occupational therapy: Eatin - Feeds self with adaptive equipment/dentures and/or feeds self with modified diet and/or performs own tube feeding  Groomin - Patient independent with all grooming tasks  Bathin - Able to bathe all 10 areas with device  Dressing-Upper: 7 - Patient independently dresses upper body  Dressing-Lower: 6 - Independent with device/prosthesis  Toiletin - Requires device (grab bar/walker/etc.)  Toilet Transfer: 6 - Independent with device (grab bar/walker/slide bar)  Tub Transfer: 4 - Minimal contact assistance, pt. expends 75% or more effort  Shower Transfer: 6 - Modified independence,  , Assessment: Pt has made functional progress during inpatient stay, pt now completes ADLs, functional mobility and transfers w/ Mod I. Pt would benefit from continued therapy once discharged from ARU in order to maximize safety and independence upon returning home.  Recommend OP OT and assist PRN at discharge     Speech therapy:  Comprehension: 7 - Patient understands complex ideas (math/planning)  Expression: PLAVIX  Take 1 tablet by mouth daily        CHANGE how you take these medications    amLODIPine 10 MG tablet  Commonly known as:  NORVASC  Take 1 tablet by mouth daily  What changed:    · medication strength  · how much to take        CONTINUE taking these medications    aspirin 81 MG tablet  Take 1 tablet by mouth daily     COQ-10 PO     therapeutic multivitamin-minerals tablet        STOP taking these medications    azithromycin 250 MG tablet  Commonly known as:  ZITHROMAX     predniSONE 10 MG tablet  Commonly known as:  Eula Brigid           Where to Get Your Medications      You can get these medications from any pharmacy    Bring a paper prescription for each of these medications  · amLODIPine 10 MG tablet  · aspirin 81 MG tablet  · clopidogrel 75 MG tablet           To comply with [facility] bylaw R.II.4.1:  Discharge order placed in advance to facilitate patient's discharge needs. Read MD Claudio    * This document was created using dictation software. While all precautions were taken to ensure accuracy, errors may have occurred. Please disregard any typographical errors.

## 2019-08-08 NOTE — PROGRESS NOTES
Occupational Therapy  Facility/Department: [facility] ACUTE REHAB UNIT  Daily Treatment Note  NAME: Jean-Paul Strange  : 1935  MRN: [id number]    Date of Service: 2019    Discharge Recommendations:  Home with assist PRN, Outpatient OT  OT Equipment Recommendations  Equipment Needed: No  Other: pt has built in shower chair, grab bars    Assessment   Performance deficits / Impairments: Decreased functional mobility ; Decreased ADL status; Decreased high-level IADLs;Decreased endurance;Decreased fine motor control;Decreased coordination;Decreased sensation;Decreased strength;Decreased safe awareness  Assessment: Pt has made functional progress during inpatient stay, pt now completes ADLs, functional mobility and transfers w/ Mod I. Pt would benefit from continued therapy once discharged from ARU in order to maximize safety and independence upon returning home. Recommend OP OT and assist PRN at discharge   Treatment Diagnosis: Above deficits associated with acute ischemic stroke  Patient Education: Role of OT, POC, ARU, discharge  REQUIRES OT FOLLOW UP: Yes  Activity Tolerance  Activity Tolerance: Patient Tolerated treatment well  Safety Devices  Safety Devices in place: Yes  Type of devices: Chair alarm in place;Call light within reach; Left in chair         Patient Diagnosis(es): There were no encounter diagnoses. has a past medical history of Aortic valve disease, CAD (coronary artery disease), CVA (cerebral infarction), and Hypertension. has a past surgical history that includes Coronary angioplasty with stent (2004); Total knee arthroplasty (Right, 7/15/2013); Cataract removal with implant (Left, 10/9/2015); Cardiac catheterization (2015); and Aortic valve replacement (2015).     Restrictions  Restrictions/Precautions  Restrictions/Precautions: General Precautions, Fall Risk  Required Braces or Orthoses?: No  Position Activity Restriction  Other position/activity restrictions: 81 yo F admitted from [facility] presents with L side weakness, slurred speech. Initially admitted at [facility]; OSH CTAHN revealed b/l vertebral stenosis, 63% stenosis of R ICA and stenosis of PCA; MRI brain from OSH revealed \"patchy acute infarct involving R lateral BG and adjacent corona radiata in area of remote infact\". Symptoms improved with d/c to home; around 7pm at home pt experienced sudden paralysis of L side of body, could not speak. Admitted to [facility] ED then to [facility]. Repeated MRI brain 7/25 revealed acute infarcts of right corona radiata/basal ganglion with microhemorrhages, deficits noted with subtle L side weakness in hand and leg  Subjective   General  Chart Reviewed: Yes  Patient assessed for rehabilitation services?: Yes  Response to previous treatment: Patient with no complaints from previous session  Family / Caregiver Present: No  Referring Practitioner: CAD s/p CABG x3 & PCI x3, AS s/p AVR  Diagnosis: Acute ischemic stroke  Subjective  Subjective: Pt seated in recliner upon entry. Very pleasant and agreeable to therapy session. General Comment  Comments:    Vital Signs  Patient Currently in Pain: Denies      Objective    ADL  Feeding: Independent  Grooming: Independent(completed in stance at sink )  UE Bathing: Modified independent   LE Bathing: Modified independent   UE Dressing: Independent  LE Dressing: Modified independent   Toileting: Modified independent   Additional Comments: Pt ambulated to closet, gathered clothing and transported items to bathroom. Pt transferred to shower, voided urine. Pt transferred to shower chair, completed UB/LB bathing and dressing alternating between sitting/standing.  Pt ambulated to sink w/o device, completed grooming tasks in stance at sink         Balance  Sitting Balance: Independent  Standing Balance: Modified independent   Standing Balance  Time: ~10 min  Activity: to/from bathroom, in stance at sink to wash hands, toilet transfer/toileting  Functional

## 2019-08-09 ENCOUNTER — HOSPITAL ENCOUNTER (OUTPATIENT)
Dept: CARDIAC CATH/INVASIVE PROCEDURES | Age: 84
Discharge: HOME | End: 2019-08-09
Attending: INTERNAL MEDICINE | Admitting: INTERNAL MEDICINE
Payer: MEDICARE

## 2019-08-09 VITALS
WEIGHT: 180 LBS | OXYGEN SATURATION: 100 % | SYSTOLIC BLOOD PRESSURE: 146 MMHG | HEIGHT: 62 IN | DIASTOLIC BLOOD PRESSURE: 58 MMHG | TEMPERATURE: 98 F | HEART RATE: 61 BPM | RESPIRATION RATE: 16 BRPM | BODY MASS INDEX: 33.13 KG/M2

## 2019-08-09 VITALS
HEIGHT: 65 IN | DIASTOLIC BLOOD PRESSURE: 75 MMHG | WEIGHT: 181 LBS | SYSTOLIC BLOOD PRESSURE: 134 MMHG | RESPIRATION RATE: 18 BRPM | OXYGEN SATURATION: 98 % | HEART RATE: 82 BPM | TEMPERATURE: 97.9 F | BODY MASS INDEX: 30.16 KG/M2

## 2019-08-09 PROCEDURE — C1764 EVENT RECORDER, CARDIAC: HCPCS

## 2019-08-09 PROCEDURE — 6370000000 HC RX 637 (ALT 250 FOR IP): Performed by: PHYSICAL MEDICINE & REHABILITATION

## 2019-08-09 PROCEDURE — 94760 N-INVAS EAR/PLS OXIMETRY 1: CPT

## 2019-08-09 PROCEDURE — 33285 INSJ SUBQ CAR RHYTHM MNTR: CPT

## 2019-08-09 PROCEDURE — 97535 SELF CARE MNGMENT TRAINING: CPT

## 2019-08-09 PROCEDURE — 33285 INSJ SUBQ CAR RHYTHM MNTR: CPT | Performed by: INTERNAL MEDICINE

## 2019-08-09 RX ADMIN — CLOPIDOGREL 75 MG: 75 TABLET, FILM COATED ORAL at 08:53

## 2019-08-09 RX ADMIN — ASPIRIN 81 MG 81 MG: 81 TABLET ORAL at 08:51

## 2019-08-09 RX ADMIN — AMLODIPINE BESYLATE 10 MG: 5 TABLET ORAL at 08:52

## 2019-08-09 ASSESSMENT — PAIN SCALES - GENERAL: PAINLEVEL_OUTOF10: 0

## 2019-08-09 NOTE — H&P
Implantable Loop recorder before discharge   Will decide if further management of Atrial fibrillation is needed    -CVA   In addition to vascular etiology, Atrial fibrillation can be a factor   As above   Continue antiplatelet     - HTN   acceptble   Continue current treatment. - S/P AVR   Acceptable valve function        Thank you for allowing me to participate in the care of Anuja Eid     NOTE: This report was transcribed using voice recognition software. Every effort was made to ensure accuracy, however, inadvertent computerized transcription errors may be present.

## 2019-08-09 NOTE — DISCHARGE INSTR - COC
Nonrheumatic aortic valve stenosis I35.0    Pneumothorax J93.9    Coronary artery disease involving native coronary artery of native heart without angina pectoris I25.10    PAF (paroxysmal atrial fibrillation) ([facility]) I48.0    Acute ischemic stroke ([facility]) I63.9    Benign essential HTN I10       Isolation/Infection:   Isolation          No Isolation            Nurse Assessment:  Last Vital Signs: /75   Pulse 82   Temp 97.9 °F (36.6 °C) (Oral)   Resp 18   Ht 5' 5\" (1.651 m)   Wt 181 lb (82.1 kg)   SpO2 98%   BMI 30.12 kg/m²     Last documented pain score (0-10 scale): Pain Level: 0  Last Weight:   Wt Readings from Last 1 Encounters:   08/05/19 181 lb (82.1 kg)     Mental Status:  oriented and alert    IV Access:  - None    Nursing Mobility/ADLs:  Walking   Independent  Transfer  Independent  Bathing  Assisted  Dressing  Independent  Toileting  Independent  Feeding  Independent  Med Admin  Assisted  Med Delivery   none and whole    Wound Care Documentation and Therapy:  Incision 12/04/15 Sternum Anterior;Mid (Active)   Number of days: 1344        Elimination:  Continence:   · Bowel: Yes  · Bladder: Yes  Urinary Catheter: None   Colostomy/Ileostomy/Ileal Conduit: No       Date of Last BM: 08/09/19    Intake/Output Summary (Last 24 hours) at 8/9/2019 1009  Last data filed at 8/9/2019 0804  Gross per 24 hour   Intake 240 ml   Output --   Net 240 ml     No intake/output data recorded. Safety Concerns: At Risk for Falls    Impairments/Disabilities:      None    Nutrition Therapy:  Current Nutrition Therapy:   - Oral Diet:  General    Routes of Feeding: None  Liquids: Thin Liquids  Daily Fluid Restriction: no  Last Modified Barium Swallow with Video (Video Swallowing Test): not done    Treatments at the Time of Hospital Discharge:   Respiratory Treatments: N/A  Oxygen Therapy:  is not on home oxygen therapy.   Ventilator:    - No ventilator support    Rehab Therapies: Physical Therapy  Weight Bearing

## 2019-08-09 NOTE — PROGRESS NOTES
~10 min  Activity: to/from bathroom, in stance at sink to wash hands, toilet transfer/toileting    Functional Mobility  Activity: To/from bathroom  Assist Level: Modified independent   Toilet Transfers  Toilet - Technique: Ambulating  Equipment Used: Standard toilet  Toilet Transfer: Modified independent    Shower Transfers  Shower - Transfer Type: To and From  Shower - Transfer To:  Transfer tub bench  Shower - Technique: Ambulating  Shower Transfers: Modified independence     Transfers  Sit to stand: Modified independent  Stand to sit: Modified independent         Plan   Plan  Times per week: Discharge home w/ OP OT & assist PRN   Times per day: Daily  Current Treatment Recommendations: Strengthening, Balance Training, Self-Care / ADL, Safety Education & Training, Cognitive/Perceptual Training, Equipment Evaluation, Education, & procurement, Neuromuscular Re-education, Functional Mobility Training, Endurance Training, Patient/Caregiver Education & Training    Goals  Short term goals  Time Frame for Short term goals: 10-14 days  Short term goal 1: Fxl mob for ADL mod I-Goal met 8/8   Short term goal 2: Fxl transfers for ADL mod I-Goal met 8/8   Short term goal 3: IADL mod I-Goal met 8/6   Short term goal 4: UB/LB dressing mod I-Goal met 8/6   Short term goal 5: UB/LB bathing mod I - Goal met 8/6   Long term goals  Time Frame for Long term goals : LTG=STG       Therapy Time   Individual Concurrent Group Co-treatment   Time In 0730         Time Out 0802         Minutes 32              Timed Code Treatment Minutes:  32   Total Treatment Minutes:  32 minutes       Leon Chowdhury, OT   Leon Chowdhury OTR/L, [place] #[id number]

## 2019-08-09 NOTE — PLAN OF CARE
No c/o pain at this time
No c/o pain this am
Problem: Falls - Risk of:  Goal: Will remain free from falls  Description  Will remain free from falls  8/7/2019 0128 by Yvette Palacios RN  Outcome: Ongoing  8/6/2019 1247 by Kim Costa RN  Outcome: Ongoing  Goal: Absence of physical injury  Description  Absence of physical injury  Outcome: Ongoing     Problem: Nutrition  Goal: Optimal nutrition therapy  8/7/2019 0128 by Yvette Palacios RN  Outcome: Ongoing  8/6/2019 1246 by Kim Costa RN  Outcome: Ongoing
Problem: Falls - Risk of:  Goal: Will remain free from falls  Description  Will remain free from falls  8/8/2019 2231 by Siobhan Harmon RN  Outcome: Ongoing  8/8/2019 1239 by Mckinley Solares RN  Outcome: Ongoing  Goal: Absence of physical injury  Description  Absence of physical injury  8/8/2019 2231 by Siobhan Harmon RN  Outcome: Ongoing  8/8/2019 1239 by Mckinley Solares RN  Outcome: Ongoing     Problem: Nutrition  Goal: Optimal nutrition therapy  8/8/2019 2231 by Siobhan Harmon RN  Outcome: Ongoing  8/8/2019 1239 by Mckinley Solares RN  Outcome: Ongoing
Problem: IP BLADDER/VOIDING  Goal: LTG - patient will achieve acceptable level of continence  7/30/2019 0015 by Erum Gutierrez RN  Outcome: Ongoing     Problem: NUTRITION  Goal: Patient maintains weight  7/30/2019 0015 by Erum Gutierrez RN  Outcome: Ongoing     Problem: SKIN INTEGRITY  Goal: STG - patient will maintain good skin integrity  7/30/2019 0015 by Erum Gutierrez RN  Outcome: Ongoing     Problem: SKIN INTEGRITY  Goal: STG - patient will maintain good skin integrity  7/30/2019 0015 by Erum Gutierrez RN  Outcome: Ongoing     Problem: Pain:  Goal: Control of acute pain  Description  Control of acute pain  7/30/2019 0015 by Erum Gutierrez RN  Outcome: Ongoing     Problem: Falls - Risk of:  Goal: Will remain free from falls  Description  Will remain free from falls  7/30/2019 0015 by Erum Gutierrez RN  Outcome: Ongoing     Problem: Falls - Risk of:  Goal: Absence of physical injury  Description  Absence of physical injury  7/30/2019 0015 by Erum Gutierrez RN  Outcome: Ongoing
Problem: IP BLADDER/VOIDING  Goal: LTG - patient will achieve acceptable level of continence  8/3/2019 0852 by Jennifer Wagner RN  Outcome: Ongoing  Goal: STG - patient participates in bladder program by expressing need to void  8/3/2019 0852 by Jennifer Wagner RN  Outcome: Ongoing     Problem: NUTRITION  Description  Altered Nutrition and Hydration  Goal: Patient maintains weight  8/3/2019 0852 by Jennifer Wagner RN  Outcome: Ongoing     Problem: SKIN INTEGRITY  Goal: STG - patient will maintain good skin integrity  8/3/2019 0852 by Jennifer Wagner RN  Outcome: Ongoing     Problem: PAIN  Goal: STG - pain is manageable through therapies  8/3/2019 2058 by Hi Webb RN  Outcome: Ongoing  8/3/2019 0852 by Jennifer Wagner RN  Outcome: Ongoing     Problem: Pain:  Description  Pain management should include both nonpharmacologic and pharmacologic interventions.   Goal: Pain level will decrease  Description  Pain level will decrease  8/3/2019 2058 by Hi Webb RN  Outcome: Ongoing  8/3/2019 0852 by Jennifer Wagner RN  Outcome: Ongoing  Goal: Control of acute pain  Description  Control of acute pain  8/3/2019 2058 by Hi Webb RN  Outcome: Ongoing  8/3/2019 0852 by Jennifer Wagner RN  Outcome: Ongoing  Goal: Control of chronic pain  Description  Control of chronic pain  8/3/2019 0852 by Jennifer Wagner RN  Outcome: Ongoing     Problem: Falls - Risk of:  Goal: Will remain free from falls  Description  Will remain free from falls  8/3/2019 0852 by Jennifer Wagner RN  Outcome: Ongoing  Goal: Absence of physical injury  Description  Absence of physical injury  8/3/2019 0852 by Jennifer Wagner RN  Outcome: Ongoing     Problem: Nutrition  Goal: Optimal nutrition therapy  8/3/2019 0852 by Jennifer Wagner RN  Outcome: Ongoing
Problem: IP BLADDER/VOIDING  Goal: LTG - patient will achieve acceptable level of continence  Outcome: Ongoing  Goal: STG - patient participates in bladder program by expressing need to void  Outcome: Ongoing     Problem: NUTRITION  Goal: Patient maintains weight  Outcome: Ongoing     Problem: SKIN INTEGRITY  Goal: STG - patient will maintain good skin integrity  Outcome: Ongoing     Problem: PAIN  Goal: STG - pain is manageable through therapies  Outcome: Ongoing     Problem: Pain:  Goal: Pain level will decrease  Description  Pain level will decrease  Outcome: Ongoing  Goal: Control of acute pain  Description  Control of acute pain  8/2/2019 1023 by Jane Husain RN  Outcome: Ongoing  8/1/2019 2122 by Moe Pierre RN  Outcome: Ongoing  Goal: Control of chronic pain  Description  Control of chronic pain  8/2/2019 1023 by Jane Husain RN  Outcome: Ongoing  8/1/2019 2122 by Moe Pierre RN  Outcome: Ongoing     Problem: Falls - Risk of:  Goal: Will remain free from falls  Description  Will remain free from falls  Outcome: Ongoing  Goal: Absence of physical injury  Description  Absence of physical injury  Outcome: Ongoing     Problem: Nutrition  Goal: Optimal nutrition therapy  Outcome: Ongoing
Problem: Nutrition  Goal: Optimal nutrition therapy  Outcome: Ongoing
Toileting 4/6   Bladder Erika, Accidents = FIM 5/6   Bowel  Erika, Accidents = FIM 5/6   Bed/Chair Transfer 4/6   Toilet Transfer 4/6   Tub/Shower Transfer  4/6   Locomotion:  Ambulation (Walk) / Wheelchair:  W = walk , w/c = wheelchair  Distance:   1= 0-49 ft , 2=  ft, 3= 150+ ft Distance: 3   Level of Assist: 4   Mode: W   FIM: 4/6      Stairs 2/6   Comprehension 5/7   Expression 5/7   Social Interaction 4/7   Problem Solving 4/7   Memory 4/7        [address] will be seen a minimum of 3 hours of therapy per day, a minimum of 5 out of 7 days per week  (please see above for specific treatment plan per PT/OT/SLP). [] In this rare instance due to the nature of this patient's medical involvement, this patient will be seen 15 hours per week (900 minutes within a 7 day period). In addition, dietician/nutritionist may monitor calorie count as well as intake and collaboratively work with SLP on dietary upgrades. Neuropsychology/Psychology may evaluate and provide necessary support. Medical issues being managed closely and that require 24 hour availability of a physician:   [] Swallowing Precautions  [x] Bowel/Bladder Fx  [] Weight bearing precautions   [] Wound Care    [x] Pain Mgmt   [] Infection Protection   [x] DVT Prophylaxis   [x] Fall Precautions  [x] Fluid/Electrolyte/Nutrition Balance   [] Voice Protection   [] Respiratory  [] Other:    Medical Prognosis: [x] Good  [] Fair    [] Guarded   Total expected IRF days 10 days  Anticipated discharge destination:    [] Home Independently   [x] Home Modified Independent  [] Home with supervision    []SNF     [] Other                                           Physician anticipated functional outcomes:   Will regain function to a modified independent level for most activities   IPOC brief synthesis: 80-year-old female with a history of CAD status post CABG, aortic stenosis status post AVR, A. fib, and embolic stroke who was admitted to [facility] on 7/24 as a

## 2019-08-19 ENCOUNTER — NURSE ONLY (OUTPATIENT)
Dept: CARDIOLOGY CLINIC | Age: 84
End: 2019-08-19
Payer: MEDICARE

## 2019-08-19 DIAGNOSIS — Z45.09 ENCOUNTER FOR ELECTRONIC ANALYSIS OF REVEAL EVENT RECORDER: Primary | ICD-10-CM

## 2019-08-19 DIAGNOSIS — I63.9 CEREBROVASCULAR ACCIDENT (CVA), UNSPECIFIED MECHANISM (HCC): ICD-10-CM

## 2019-08-19 PROCEDURE — 93291 INTERROG DEV EVAL SCRMS IP: CPT | Performed by: INTERNAL MEDICINE

## 2019-09-24 ENCOUNTER — NURSE ONLY (OUTPATIENT)
Dept: CARDIOLOGY CLINIC | Age: 84
End: 2019-09-24
Payer: MEDICARE

## 2019-09-24 DIAGNOSIS — Z45.09 ENCOUNTER FOR ELECTRONIC ANALYSIS OF REVEAL EVENT RECORDER: ICD-10-CM

## 2019-09-24 DIAGNOSIS — I63.9 CEREBROVASCULAR ACCIDENT (CVA), UNSPECIFIED MECHANISM (HCC): ICD-10-CM

## 2019-09-24 PROCEDURE — 93299 PR REM INTERROG ICPMS/SCRMS <30 D TECH REVIEW: CPT | Performed by: INTERNAL MEDICINE

## 2019-09-24 PROCEDURE — 93298 REM INTERROG DEV EVAL SCRMS: CPT | Performed by: INTERNAL MEDICINE

## 2019-10-08 ENCOUNTER — OFFICE VISIT (OUTPATIENT)
Dept: CARDIOLOGY CLINIC | Age: 84
End: 2019-10-08
Payer: MEDICARE

## 2019-10-08 VITALS
DIASTOLIC BLOOD PRESSURE: 70 MMHG | WEIGHT: 185 LBS | SYSTOLIC BLOOD PRESSURE: 150 MMHG | HEART RATE: 80 BPM | HEIGHT: 62 IN | BODY MASS INDEX: 34.04 KG/M2 | OXYGEN SATURATION: 99 %

## 2019-10-08 DIAGNOSIS — E78.49 OTHER HYPERLIPIDEMIA: ICD-10-CM

## 2019-10-08 DIAGNOSIS — I25.10 CORONARY ARTERY DISEASE INVOLVING NATIVE CORONARY ARTERY OF NATIVE HEART WITHOUT ANGINA PECTORIS: Chronic | ICD-10-CM

## 2019-10-08 DIAGNOSIS — I48.0 PAF (PAROXYSMAL ATRIAL FIBRILLATION) (HCC): Primary | ICD-10-CM

## 2019-10-08 DIAGNOSIS — I10 ESSENTIAL HYPERTENSION: Chronic | ICD-10-CM

## 2019-10-08 PROCEDURE — 99214 OFFICE O/P EST MOD 30 MIN: CPT | Performed by: INTERNAL MEDICINE

## 2019-10-08 RX ORDER — ROSUVASTATIN CALCIUM 5 MG/1
5 TABLET, COATED ORAL DAILY
Qty: 90 TABLET | Refills: 3 | Status: SHIPPED | OUTPATIENT
Start: 2019-10-08 | End: 2020-05-21 | Stop reason: SDUPTHER

## 2019-10-08 RX ORDER — AMLODIPINE BESYLATE 5 MG/1
5 TABLET ORAL DAILY
Qty: 90 TABLET | Refills: 3
Start: 2019-10-08 | End: 2019-10-08 | Stop reason: SDUPTHER

## 2019-10-08 RX ORDER — AMLODIPINE BESYLATE 5 MG/1
5 TABLET ORAL DAILY
Qty: 90 TABLET | Refills: 3 | Status: SHIPPED | OUTPATIENT
Start: 2019-10-08 | End: 2020-11-18

## 2019-11-12 ENCOUNTER — NURSE ONLY (OUTPATIENT)
Dept: CARDIOLOGY CLINIC | Age: 84
End: 2019-11-12
Payer: MEDICARE

## 2019-11-12 DIAGNOSIS — Z45.09 ENCOUNTER FOR ELECTRONIC ANALYSIS OF REVEAL EVENT RECORDER: ICD-10-CM

## 2019-11-12 DIAGNOSIS — I63.9 CEREBROVASCULAR ACCIDENT (CVA), UNSPECIFIED MECHANISM (HCC): ICD-10-CM

## 2019-11-12 PROCEDURE — 93298 REM INTERROG DEV EVAL SCRMS: CPT | Performed by: INTERNAL MEDICINE

## 2019-11-12 PROCEDURE — 93299 PR REM INTERROG ICPMS/SCRMS <30 D TECH REVIEW: CPT | Performed by: INTERNAL MEDICINE

## 2019-12-17 ENCOUNTER — NURSE ONLY (OUTPATIENT)
Dept: CARDIOLOGY CLINIC | Age: 84
End: 2019-12-17
Payer: MEDICARE

## 2019-12-17 DIAGNOSIS — Z45.09 ENCOUNTER FOR ELECTRONIC ANALYSIS OF REVEAL EVENT RECORDER: ICD-10-CM

## 2019-12-17 DIAGNOSIS — I63.9 CEREBROVASCULAR ACCIDENT (CVA), UNSPECIFIED MECHANISM (HCC): ICD-10-CM

## 2019-12-17 PROCEDURE — 93299 PR REM INTERROG ICPMS/SCRMS <30 D TECH REVIEW: CPT | Performed by: INTERNAL MEDICINE

## 2019-12-17 PROCEDURE — 93298 REM INTERROG DEV EVAL SCRMS: CPT | Performed by: INTERNAL MEDICINE

## 2020-01-21 ENCOUNTER — NURSE ONLY (OUTPATIENT)
Dept: CARDIOLOGY CLINIC | Age: 85
End: 2020-01-21
Payer: MEDICARE

## 2020-01-21 PROCEDURE — 93298 REM INTERROG DEV EVAL SCRMS: CPT | Performed by: INTERNAL MEDICINE

## 2020-01-21 PROCEDURE — G2066 INTER DEVC REMOTE 30D: HCPCS | Performed by: INTERNAL MEDICINE

## 2020-01-21 NOTE — LETTER
[address] [phone]  [address] [place] [phone]    Pacemaker/Defibrillator Clinic          01/21/20        Serina Eid  [address] [facility] [address]        Dear Serina Eid    This letter is to inform you that we received the transmission from your monitor at home that checks your implanted heart device. The next date your monitor will automatically transmit will be 2-24-20. If your report needs attention we will notify you. Your device and monitor are wireless and most transmit cellularly, but please periodically check your monitor is still plugged in to the electrical outlet. If you still use the telephone land line to send please ensure the connection to the phone missy is secure. This will help to ensure successful automatic transmissions in the future. Also, the monitor needs to be close to you while sleeping at night. Please be aware that the remote device transmission sites are periodically monitored only during regular business hours during which simultaneous in-office device clinics are being run. If your transmission requires attention, we will contact you as soon as possible. Thank you.             Velia [address]

## 2020-02-23 PROCEDURE — 93298 REM INTERROG DEV EVAL SCRMS: CPT | Performed by: INTERNAL MEDICINE

## 2020-02-23 PROCEDURE — G2066 INTER DEVC REMOTE 30D: HCPCS | Performed by: INTERNAL MEDICINE

## 2020-02-24 ENCOUNTER — NURSE ONLY (OUTPATIENT)
Dept: CARDIOLOGY CLINIC | Age: 85
End: 2020-02-24
Payer: MEDICARE

## 2020-03-30 ENCOUNTER — NURSE ONLY (OUTPATIENT)
Dept: CARDIOLOGY CLINIC | Age: 85
End: 2020-03-30
Payer: MEDICARE

## 2020-03-30 PROCEDURE — 93298 REM INTERROG DEV EVAL SCRMS: CPT | Performed by: INTERNAL MEDICINE

## 2020-03-30 PROCEDURE — G2066 INTER DEVC REMOTE 30D: HCPCS | Performed by: INTERNAL MEDICINE

## 2020-03-30 NOTE — LETTER
[address] [facility] [phone]  [address] [facility] [phone]    Pacemaker/Defibrillator Clinic          03/30/20        [address] [facility] [address] [facility] [address] [facility] [address]        Dear Les Eid    This letter is to inform you that we received the transmission from your monitor at home that checks your implanted heart device. The next date your monitor will automatically transmit will be 5-4-20. If your report needs attention we will notify you. Your device and monitor are wireless and most transmit cellularly, but please periodically check your monitor is still plugged in to the electrical outlet. If you still use the telephone land line to send please ensure the connection to the phone missy is secure. This will help to ensure successful automatic transmissions in the future. Also, the monitor needs to be close to you while sleeping at night. Please be aware that the remote device transmission sites are periodically monitored only during regular business hours during which simultaneous in-office device clinics are being run. If your transmission requires attention, we will contact you as soon as possible. Thank you.             [facility]

## 2020-04-15 NOTE — PROGRESS NOTES
[facility]   Cardiac Follow up    Referring Provider:  Ifeanyi Zaidi DO   Chief Complaint   Patient presents with    6 Month Follow-Up    Hypertension    Coronary Artery Disease    Atrial Fibrillation       Cc-f/up AVR   History of Present Illness:   Td Grissom has a history of porcine aortic valve replacement and paroxysmal a fib. She has had a CVA. Ms Mikhail Zacarias states she has had 2 strokes since last seen. She states she was at [facility]. She did inpatient rehab at [facility] in August, 2019. Dr Fariba Deshpande implanted a ILR 8/9/19. This has not shown any atrial fibrillation so far. Today, Ms Mikhali Zacarias states she feels that her breathing and stamina has been worse over the last month. she states \"I feel like I did before \". She states that she has not been able to ride her bike or walk as much as she was able to previously. Has dyspnea and chest tightness with any exertion    Past Medical History:   has a past medical history of Aortic valve disease, CAD (coronary artery disease), CVA (cerebral infarction), and Hypertension. Surgical History:   has a past surgical history that includes Coronary angioplasty with stent (12/22/2004); Total knee arthroplasty (Right, 7/15/2013); Cataract removal with implant (Left, 10/9/2015); Cardiac catheterization (11/19/2015); and Aortic valve replacement (12/4/2015). Social History:   reports that she has never smoked. She has never used smokeless tobacco. She reports that she does not drink alcohol or use drugs. Family History:  family history includes Heart Disease in her father. Home Medications:  Prior to Admission medications    Medication Sig Start Date End Date Taking?  Authorizing Provider   rosuvastatin (CRESTOR) 5 MG tablet Take 1 tablet by mouth daily 10/8/19  Yes Can Marroquin MD   amLODIPine (NORVASC) 5 MG tablet Take 1 tablet by mouth daily 10/8/19  Yes Can Marroquin MD   aspirin 81 MG tablet Take 1 tablet by mouth daily 8/8/19  Yes Hannah Messina MD   clopidogrel (PLAVIX) 75 MG tablet Take 1 tablet by mouth daily 8/8/19  Yes Hannah Messina MD   Coenzyme Q10 (COQ-10 PO) Take by mouth   Yes Historical Provider, MD   therapeutic multivitamin-minerals (THERAGRAN-M) tablet Take 1 tablet by mouth daily. Yes Historical Provider, MD      Allergies:  Lipitor and Penicillins     Review of Systems:   · Constitutional: there has been no unanticipated weight loss. There's been no change in energy level, sleep pattern, or activity level. · Eyes: No visual changes or diplopia. No scleral icterus. · ENT: No Headaches, hearing loss or vertigo. No mouth sores or sore throat. · Cardiovascular: Reviewed in HPI  · Respiratory: No cough or wheezing, no sputum production. No hematemesis. · Gastrointestinal: No abdominal pain, appetite loss, blood in stools. No change in bowel or bladder habits. · Genitourinary: No dysuria, trouble voiding, or hematuria. · Musculoskeletal:  No gait disturbance, weakness or joint complaints. · Integumentary: No rash or pruritis. · Neurological: No headache, diplopia, change in muscle strength, numbness or tingling. No change in gait, balance, coordination, mood, affect, memory, mentation, behavior. · Psychiatric: No anxiety, no depression. · Endocrine: No malaise, fatigue or temperature intolerance. No excessive thirst, fluid intake, or urination. No tremor. · Hematologic/Lymphatic: No abnormal bruising or bleeding, blood clots or swollen lymph nodes. · Allergic/Immunologic: No nasal congestion or hives. Physical Examination:    Blood pressure 130/78, pulse 86, temperature 99 °F (37.2 °C), temperature source Tympanic, height 5' 2\" (1.575 m), weight 187 lb (84.8 kg), SpO2 96 %, not currently breastfeeding.     Constitutional and General Appearance: alert,oriented, no acute distress  Skin:good turgor,intact without lesions  HEENT: EOMI ,normal  Neck:no JVD    Respiratory:  · Normal excursion and expansion without use of accessory muscles  · Resp Auscultation: Normal breath sounds without dullness  Cardiovascular:  · The apical impulses not displaced  · II/IV JIGAR, Diastolic Murmur  - worsening   · Cervical veins are not engorged  · The carotid upstroke is normal in amplitude and contour without delay or bruit  · Peripheral pulses are symmetrical and full  · There is no clubbing, cyanosis of the extremities. · No edema  · Femoral Arteries: 2+ and equal  · Pedal Pulses: 2+ and equal   Abdomen:  · No masses or tenderness  · Liver/Spleen: No Abnormalities Noted  Neurological/Psychiatric:  · Alert and oriented in all spheres  · Moves all extremities well  · Exhibits normal gait balance and coordination  · No abnormalities of mood, affect, memory, mentation, or behavior are noted    ECHO 5/31/19  Normal left ventricle size, wall thickness and systolic function with an   estimated ejection fraction of 60%. No regional wall motion abnormalities   are seen.   E/e\"= 12. Diastolic filling parameters suggests grade I diastolic   dysfunction.   Mild to moderate mitral regurgitation   The left atrium is mildly dilated.   A bioprosthetic aortic valve (21mm Mosaic Ultra) appears well seated with a   maximum gradient of 27 mmHg and a mean gradient of 17 mmHg. Aortic valve   area of 1.4cm2. Mild aortic regurgitation.   Mild tricuspid regurgitation. PASP 24mmHg.   IVC size is normal (<2.1cm) and collapses > 50% with respiration consistent   with normal RA pressure (3mmHg).    Assessment:     CAD  Worsening SOB. Plan for [address] [place] [address] 2004> PCI of proximal LAD  [facility] 2013> Cath with LM normal, LAD mid 30%, distal 40%, D1-40%, D4-50%(small), widely patent proximal stent, Cx Ostial 20%. RCA Mid 30% X2 and LVG 60%. Medically managed  [facility] 2015> No flow-significant coronary stenoses are identified. The left anterior descending stent is widely patent.       Aortic Valve Replacement   12/4/15 AVR Mosaic porcine performed by

## 2020-04-21 ENCOUNTER — OFFICE VISIT (OUTPATIENT)
Dept: CARDIOLOGY CLINIC | Age: 85
End: 2020-04-21
Payer: MEDICARE

## 2020-04-21 VITALS
OXYGEN SATURATION: 96 % | HEART RATE: 86 BPM | HEIGHT: 62 IN | BODY MASS INDEX: 34.41 KG/M2 | SYSTOLIC BLOOD PRESSURE: 130 MMHG | WEIGHT: 187 LBS | DIASTOLIC BLOOD PRESSURE: 78 MMHG | TEMPERATURE: 99 F

## 2020-04-21 PROCEDURE — 99214 OFFICE O/P EST MOD 30 MIN: CPT | Performed by: INTERNAL MEDICINE

## 2020-04-21 RX ORDER — CLOPIDOGREL BISULFATE 75 MG/1
75 TABLET ORAL DAILY
Qty: 30 TABLET | Refills: 3 | Status: SHIPPED | OUTPATIENT
Start: 2020-04-21 | End: 2020-05-21 | Stop reason: ALTCHOICE

## 2020-05-04 ENCOUNTER — OFFICE VISIT (OUTPATIENT)
Dept: PRIMARY CARE CLINIC | Age: 85
End: 2020-05-04

## 2020-05-04 ENCOUNTER — NURSE ONLY (OUTPATIENT)
Dept: CARDIOLOGY CLINIC | Age: 85
End: 2020-05-04
Payer: MEDICARE

## 2020-05-04 PROCEDURE — G2066 INTER DEVC REMOTE 30D: HCPCS | Performed by: INTERNAL MEDICINE

## 2020-05-04 PROCEDURE — 93298 REM INTERROG DEV EVAL SCRMS: CPT | Performed by: INTERNAL MEDICINE

## 2020-05-04 NOTE — PROGRESS NOTES
[employer] remote Linq report shows no arrhythmias. We will continue to monitor remotely.

## 2020-05-07 ENCOUNTER — HOSPITAL ENCOUNTER (OUTPATIENT)
Dept: CARDIAC CATH/INVASIVE PROCEDURES | Age: 85
Discharge: HOME | End: 2020-05-08
Attending: INTERNAL MEDICINE | Admitting: INTERNAL MEDICINE
Payer: MEDICARE

## 2020-05-07 PROBLEM — D64.9 ANEMIA: Status: ACTIVE | Noted: 2020-05-07

## 2020-05-07 LAB
ABO/RH: NORMAL
ANION GAP SERPL CALCULATED.3IONS-SCNC: 12 MMOL/L (ref 3–16)
ANTIBODY SCREEN: NORMAL
BLOOD BANK DISPENSE STATUS: NORMAL
BLOOD BANK PRODUCT CODE: NORMAL
BPU ID: NORMAL
BUN BLDV-MCNC: 20 MG/DL (ref 7–20)
CALCIUM SERPL-MCNC: 9.2 MG/DL (ref 8.3–10.6)
CHLORIDE BLD-SCNC: 102 MMOL/L (ref 99–110)
CO2: 24 MMOL/L (ref 21–32)
CREAT SERPL-MCNC: 0.7 MG/DL (ref 0.6–1.2)
DESCRIPTION BLOOD BANK: NORMAL
EKG ATRIAL RATE: 78 BPM
EKG DIAGNOSIS: NORMAL
EKG P AXIS: 77 DEGREES
EKG P-R INTERVAL: 158 MS
EKG Q-T INTERVAL: 404 MS
EKG QRS DURATION: 90 MS
EKG QTC CALCULATION (BAZETT): 460 MS
EKG R AXIS: -10 DEGREES
EKG T AXIS: 66 DEGREES
EKG VENTRICULAR RATE: 78 BPM
FERRITIN: 10.1 NG/ML (ref 15–150)
GFR AFRICAN AMERICAN: >60
GFR NON-AFRICAN AMERICAN: >60
GLUCOSE BLD-MCNC: 93 MG/DL (ref 70–99)
HCT VFR BLD CALC: 27 % (ref 36–48)
HCT VFR BLD CALC: 30.7 % (ref 36–48)
HEMOGLOBIN: 8.3 G/DL (ref 12–16)
HEMOGLOBIN: 9.5 G/DL (ref 12–16)
IRON SATURATION: 5 % (ref 15–50)
IRON: 21 UG/DL (ref 37–145)
LEFT VENTRICULAR EJECTION FRACTION MODE: NORMAL
LV EF: 60 %
MCH RBC QN AUTO: 22 PG (ref 26–34)
MCHC RBC AUTO-ENTMCNC: 30.6 G/DL (ref 31–36)
MCV RBC AUTO: 71.9 FL (ref 80–100)
PDW BLD-RTO: 22.7 % (ref 12.4–15.4)
PLATELET # BLD: 258 K/UL (ref 135–450)
PMV BLD AUTO: 9.3 FL (ref 5–10.5)
POTASSIUM SERPL-SCNC: 4.1 MMOL/L (ref 3.5–5.1)
RBC # BLD: 3.75 M/UL (ref 4–5.2)
SARS-COV-2: NOT DETECTED
SODIUM BLD-SCNC: 138 MMOL/L (ref 136–145)
TOTAL IRON BINDING CAPACITY: 436 UG/DL (ref 260–445)
WBC # BLD: 3.7 K/UL (ref 4–11)

## 2020-05-07 PROCEDURE — 36415 COLL VENOUS BLD VENIPUNCTURE: CPT

## 2020-05-07 PROCEDURE — 82728 ASSAY OF FERRITIN: CPT

## 2020-05-07 PROCEDURE — 2500000003 HC RX 250 WO HCPCS

## 2020-05-07 PROCEDURE — 80048 BASIC METABOLIC PNL TOTAL CA: CPT

## 2020-05-07 PROCEDURE — 93458 L HRT ARTERY/VENTRICLE ANGIO: CPT

## 2020-05-07 PROCEDURE — 93010 ELECTROCARDIOGRAM REPORT: CPT | Performed by: INTERNAL MEDICINE

## 2020-05-07 PROCEDURE — 86901 BLOOD TYPING SEROLOGIC RH(D): CPT

## 2020-05-07 PROCEDURE — 99152 MOD SED SAME PHYS/QHP 5/>YRS: CPT

## 2020-05-07 PROCEDURE — 83550 IRON BINDING TEST: CPT

## 2020-05-07 PROCEDURE — 99024 POSTOP FOLLOW-UP VISIT: CPT | Performed by: INTERNAL MEDICINE

## 2020-05-07 PROCEDURE — 83540 ASSAY OF IRON: CPT

## 2020-05-07 PROCEDURE — 86923 COMPATIBILITY TEST ELECTRIC: CPT

## 2020-05-07 PROCEDURE — 6370000000 HC RX 637 (ALT 250 FOR IP): Performed by: INTERNAL MEDICINE

## 2020-05-07 PROCEDURE — 86900 BLOOD TYPING SEROLOGIC ABO: CPT

## 2020-05-07 PROCEDURE — 6360000002 HC RX W HCPCS: Performed by: INTERNAL MEDICINE

## 2020-05-07 PROCEDURE — 36430 TRANSFUSION BLD/BLD COMPNT: CPT

## 2020-05-07 PROCEDURE — 93005 ELECTROCARDIOGRAM TRACING: CPT | Performed by: INTERNAL MEDICINE

## 2020-05-07 PROCEDURE — 85014 HEMATOCRIT: CPT

## 2020-05-07 PROCEDURE — 85018 HEMOGLOBIN: CPT

## 2020-05-07 PROCEDURE — C1894 INTRO/SHEATH, NON-LASER: HCPCS

## 2020-05-07 PROCEDURE — 6360000004 HC RX CONTRAST MEDICATION: Performed by: INTERNAL MEDICINE

## 2020-05-07 PROCEDURE — P9016 RBC LEUKOCYTES REDUCED: HCPCS

## 2020-05-07 PROCEDURE — 99153 MOD SED SAME PHYS/QHP EA: CPT

## 2020-05-07 PROCEDURE — 86850 RBC ANTIBODY SCREEN: CPT

## 2020-05-07 PROCEDURE — 93458 L HRT ARTERY/VENTRICLE ANGIO: CPT | Performed by: INTERNAL MEDICINE

## 2020-05-07 PROCEDURE — 85027 COMPLETE CBC AUTOMATED: CPT

## 2020-05-07 PROCEDURE — 1200000000 HC SEMI PRIVATE

## 2020-05-07 PROCEDURE — 93567 NJX CAR CTH SPRVLV AORTGRPHY: CPT | Performed by: INTERNAL MEDICINE

## 2020-05-07 PROCEDURE — 2580000003 HC RX 258: Performed by: INTERNAL MEDICINE

## 2020-05-07 PROCEDURE — C1769 GUIDE WIRE: HCPCS

## 2020-05-07 PROCEDURE — 6360000002 HC RX W HCPCS

## 2020-05-07 PROCEDURE — C1887 CATHETER, GUIDING: HCPCS

## 2020-05-07 RX ORDER — 0.9 % SODIUM CHLORIDE 0.9 %
250 INTRAVENOUS SOLUTION INTRAVENOUS ONCE
Status: DISCONTINUED | OUTPATIENT
Start: 2020-05-07 | End: 2020-05-08 | Stop reason: HOSPADM

## 2020-05-07 RX ORDER — M-VIT,TX,IRON,MINS/CALC/FOLIC 27MG-0.4MG
1 TABLET ORAL DAILY
Status: DISCONTINUED | OUTPATIENT
Start: 2020-05-08 | End: 2020-05-08 | Stop reason: HOSPADM

## 2020-05-07 RX ORDER — ASPIRIN 81 MG/1
81 TABLET ORAL DAILY
Status: DISCONTINUED | OUTPATIENT
Start: 2020-05-08 | End: 2020-05-08 | Stop reason: HOSPADM

## 2020-05-07 RX ORDER — PEG-3350, SODIUM SULFATE, SODIUM CHLORIDE, POTASSIUM CHLORIDE, SODIUM ASCORBATE AND ASCORBIC ACID 7.5-2.691G
100 KIT ORAL ONCE
Status: COMPLETED | OUTPATIENT
Start: 2020-05-08 | End: 2020-05-08

## 2020-05-07 RX ORDER — ACETAMINOPHEN 325 MG/1
650 TABLET ORAL EVERY 4 HOURS PRN
Status: DISCONTINUED | OUTPATIENT
Start: 2020-05-07 | End: 2020-05-08 | Stop reason: HOSPADM

## 2020-05-07 RX ORDER — SODIUM CHLORIDE 0.9 % (FLUSH) 0.9 %
10 SYRINGE (ML) INJECTION PRN
Status: DISCONTINUED | OUTPATIENT
Start: 2020-05-07 | End: 2020-05-08 | Stop reason: HOSPADM

## 2020-05-07 RX ORDER — SODIUM CHLORIDE 0.9 % (FLUSH) 0.9 %
10 SYRINGE (ML) INJECTION EVERY 12 HOURS SCHEDULED
Status: DISCONTINUED | OUTPATIENT
Start: 2020-05-07 | End: 2020-05-08 | Stop reason: HOSPADM

## 2020-05-07 RX ORDER — AMLODIPINE BESYLATE 5 MG/1
5 TABLET ORAL DAILY
Status: DISCONTINUED | OUTPATIENT
Start: 2020-05-07 | End: 2020-05-08 | Stop reason: HOSPADM

## 2020-05-07 RX ORDER — ROSUVASTATIN CALCIUM 10 MG/1
5 TABLET, COATED ORAL NIGHTLY
Status: DISCONTINUED | OUTPATIENT
Start: 2020-05-07 | End: 2020-05-08 | Stop reason: HOSPADM

## 2020-05-07 RX ORDER — SODIUM CHLORIDE 9 MG/ML
INJECTION, SOLUTION INTRAVENOUS CONTINUOUS
Status: DISCONTINUED | OUTPATIENT
Start: 2020-05-07 | End: 2020-05-08 | Stop reason: HOSPADM

## 2020-05-07 RX ORDER — PEG-3350, SODIUM SULFATE, SODIUM CHLORIDE, POTASSIUM CHLORIDE, SODIUM ASCORBATE AND ASCORBIC ACID 7.5-2.691G
100 KIT ORAL ONCE
Status: COMPLETED | OUTPATIENT
Start: 2020-05-07 | End: 2020-05-07

## 2020-05-07 RX ORDER — FUROSEMIDE 10 MG/ML
20 INJECTION INTRAMUSCULAR; INTRAVENOUS SEE ADMIN INSTRUCTIONS
Status: COMPLETED | OUTPATIENT
Start: 2020-05-07 | End: 2020-05-07

## 2020-05-07 RX ADMIN — AMLODIPINE BESYLATE 5 MG: 5 TABLET ORAL at 12:41

## 2020-05-07 RX ADMIN — Medication 10 ML: at 17:52

## 2020-05-07 RX ADMIN — FUROSEMIDE 20 MG: 10 INJECTION, SOLUTION INTRAMUSCULAR; INTRAVENOUS at 17:51

## 2020-05-07 RX ADMIN — IOPAMIDOL 104 ML: 755 INJECTION, SOLUTION INTRAVENOUS at 09:25

## 2020-05-07 RX ADMIN — SODIUM CHLORIDE: 9 INJECTION, SOLUTION INTRAVENOUS at 23:44

## 2020-05-07 RX ADMIN — SODIUM CHLORIDE: 9 INJECTION, SOLUTION INTRAVENOUS at 12:41

## 2020-05-07 RX ADMIN — POLYETHYLENE GLYCOL 3350, SODIUM SULFATE, SODIUM CHLORIDE, POTASSIUM CHLORIDE, ASCORBIC ACID, SODIUM ASCORBATE 100 G: KIT at 19:53

## 2020-05-07 RX ADMIN — SODIUM CHLORIDE 100 MG: 9 INJECTION, SOLUTION INTRAVENOUS at 13:47

## 2020-05-07 RX ADMIN — POLYETHYLENE GLYCOL 3350, SODIUM SULFATE, SODIUM CHLORIDE, POTASSIUM CHLORIDE, ASCORBIC ACID, SODIUM ASCORBATE 100 G: KIT at 19:39

## 2020-05-07 ASSESSMENT — PAIN SCALES - GENERAL
PAINLEVEL_OUTOF10: 0

## 2020-05-08 ENCOUNTER — ANESTHESIA (OUTPATIENT)
Dept: ENDOSCOPY | Age: 85
End: 2020-05-08
Payer: MEDICARE

## 2020-05-08 ENCOUNTER — ANESTHESIA EVENT (OUTPATIENT)
Dept: ENDOSCOPY | Age: 85
End: 2020-05-08
Payer: MEDICARE

## 2020-05-08 VITALS
OXYGEN SATURATION: 100 % | SYSTOLIC BLOOD PRESSURE: 124 MMHG | RESPIRATION RATE: 11 BRPM | DIASTOLIC BLOOD PRESSURE: 60 MMHG

## 2020-05-08 VITALS
TEMPERATURE: 97.7 F | OXYGEN SATURATION: 99 % | RESPIRATION RATE: 16 BRPM | WEIGHT: 183.6 LBS | SYSTOLIC BLOOD PRESSURE: 169 MMHG | HEIGHT: 62 IN | HEART RATE: 83 BPM | BODY MASS INDEX: 33.79 KG/M2 | DIASTOLIC BLOOD PRESSURE: 67 MMHG

## 2020-05-08 LAB
ANION GAP SERPL CALCULATED.3IONS-SCNC: 10 MMOL/L (ref 3–16)
BUN BLDV-MCNC: 11 MG/DL (ref 7–20)
CALCIUM SERPL-MCNC: 9.8 MG/DL (ref 8.3–10.6)
CHLORIDE BLD-SCNC: 107 MMOL/L (ref 99–110)
CO2: 25 MMOL/L (ref 21–32)
CREAT SERPL-MCNC: 0.5 MG/DL (ref 0.6–1.2)
GFR AFRICAN AMERICAN: >60
GFR NON-AFRICAN AMERICAN: >60
GLUCOSE BLD-MCNC: 102 MG/DL (ref 70–99)
HCT VFR BLD CALC: 32.1 % (ref 36–48)
HEMOGLOBIN: 10 G/DL (ref 12–16)
MCH RBC QN AUTO: 23.3 PG (ref 26–34)
MCHC RBC AUTO-ENTMCNC: 31.1 G/DL (ref 31–36)
MCV RBC AUTO: 74.9 FL (ref 80–100)
PDW BLD-RTO: 23.8 % (ref 12.4–15.4)
PLATELET # BLD: 253 K/UL (ref 135–450)
PMV BLD AUTO: 9.1 FL (ref 5–10.5)
POTASSIUM SERPL-SCNC: 4.5 MMOL/L (ref 3.5–5.1)
RBC # BLD: 4.29 M/UL (ref 4–5.2)
SODIUM BLD-SCNC: 142 MMOL/L (ref 136–145)
WBC # BLD: 3.7 K/UL (ref 4–11)

## 2020-05-08 PROCEDURE — 2580000003 HC RX 258: Performed by: INTERNAL MEDICINE

## 2020-05-08 PROCEDURE — 2709999900 HC NON-CHARGEABLE SUPPLY: Performed by: INTERNAL MEDICINE

## 2020-05-08 PROCEDURE — 3609012400 HC EGD TRANSORAL BIOPSY SINGLE/MULTIPLE: Performed by: INTERNAL MEDICINE

## 2020-05-08 PROCEDURE — 2580000003 HC RX 258: Performed by: NURSE ANESTHETIST, CERTIFIED REGISTERED

## 2020-05-08 PROCEDURE — 3609010600 HC COLONOSCOPY POLYPECTOMY SNARE/COLD BIOPSY: Performed by: INTERNAL MEDICINE

## 2020-05-08 PROCEDURE — 3700000000 HC ANESTHESIA ATTENDED CARE: Performed by: INTERNAL MEDICINE

## 2020-05-08 PROCEDURE — 7100000001 HC PACU RECOVERY - ADDTL 15 MIN: Performed by: INTERNAL MEDICINE

## 2020-05-08 PROCEDURE — 2500000003 HC RX 250 WO HCPCS: Performed by: NURSE ANESTHETIST, CERTIFIED REGISTERED

## 2020-05-08 PROCEDURE — 80048 BASIC METABOLIC PNL TOTAL CA: CPT

## 2020-05-08 PROCEDURE — 6360000002 HC RX W HCPCS: Performed by: NURSE ANESTHETIST, CERTIFIED REGISTERED

## 2020-05-08 PROCEDURE — 85027 COMPLETE CBC AUTOMATED: CPT

## 2020-05-08 PROCEDURE — 3700000001 HC ADD 15 MINUTES (ANESTHESIA): Performed by: INTERNAL MEDICINE

## 2020-05-08 PROCEDURE — 36415 COLL VENOUS BLD VENIPUNCTURE: CPT

## 2020-05-08 PROCEDURE — 7100000000 HC PACU RECOVERY - FIRST 15 MIN: Performed by: INTERNAL MEDICINE

## 2020-05-08 PROCEDURE — 6370000000 HC RX 637 (ALT 250 FOR IP): Performed by: INTERNAL MEDICINE

## 2020-05-08 PROCEDURE — 99239 HOSP IP/OBS DSCHRG MGMT >30: CPT | Performed by: INTERNAL MEDICINE

## 2020-05-08 PROCEDURE — 88305 TISSUE EXAM BY PATHOLOGIST: CPT

## 2020-05-08 RX ORDER — SODIUM CHLORIDE 9 MG/ML
INJECTION, SOLUTION INTRAVENOUS CONTINUOUS
Status: DISCONTINUED | OUTPATIENT
Start: 2020-05-08 | End: 2020-05-08 | Stop reason: HOSPADM

## 2020-05-08 RX ORDER — PROPOFOL 10 MG/ML
INJECTION, EMULSION INTRAVENOUS CONTINUOUS PRN
Status: DISCONTINUED | OUTPATIENT
Start: 2020-05-08 | End: 2020-05-08 | Stop reason: SDUPTHER

## 2020-05-08 RX ORDER — LISINOPRIL 10 MG/1
10 TABLET ORAL DAILY
Status: DISCONTINUED | OUTPATIENT
Start: 2020-05-08 | End: 2020-05-08 | Stop reason: HOSPADM

## 2020-05-08 RX ORDER — LIDOCAINE HYDROCHLORIDE 20 MG/ML
INJECTION, SOLUTION INFILTRATION; PERINEURAL PRN
Status: DISCONTINUED | OUTPATIENT
Start: 2020-05-08 | End: 2020-05-08 | Stop reason: SDUPTHER

## 2020-05-08 RX ORDER — SODIUM CHLORIDE 0.9 % (FLUSH) 0.9 %
10 SYRINGE (ML) INJECTION EVERY 12 HOURS SCHEDULED
Status: DISCONTINUED | OUTPATIENT
Start: 2020-05-08 | End: 2020-05-08 | Stop reason: HOSPADM

## 2020-05-08 RX ORDER — LABETALOL HYDROCHLORIDE 5 MG/ML
5 INJECTION, SOLUTION INTRAVENOUS EVERY 10 MIN PRN
Status: DISCONTINUED | OUTPATIENT
Start: 2020-05-08 | End: 2020-05-08

## 2020-05-08 RX ORDER — PANTOPRAZOLE SODIUM 40 MG/1
40 TABLET, DELAYED RELEASE ORAL
Qty: 30 TABLET | Refills: 5 | Status: SHIPPED | OUTPATIENT
Start: 2020-05-08 | End: 2021-05-18

## 2020-05-08 RX ORDER — LISINOPRIL 10 MG/1
10 TABLET ORAL DAILY
Qty: 90 TABLET | Refills: 3 | Status: SHIPPED | OUTPATIENT
Start: 2020-05-08 | End: 2020-07-23

## 2020-05-08 RX ORDER — PROPOFOL 10 MG/ML
INJECTION, EMULSION INTRAVENOUS PRN
Status: DISCONTINUED | OUTPATIENT
Start: 2020-05-08 | End: 2020-05-08 | Stop reason: SDUPTHER

## 2020-05-08 RX ORDER — SODIUM CHLORIDE 0.9 % (FLUSH) 0.9 %
10 SYRINGE (ML) INJECTION PRN
Status: DISCONTINUED | OUTPATIENT
Start: 2020-05-08 | End: 2020-05-08 | Stop reason: HOSPADM

## 2020-05-08 RX ORDER — PROMETHAZINE HYDROCHLORIDE 25 MG/ML
6.25 INJECTION, SOLUTION INTRAMUSCULAR; INTRAVENOUS
Status: DISCONTINUED | OUTPATIENT
Start: 2020-05-08 | End: 2020-05-08

## 2020-05-08 RX ORDER — FERROUS SULFATE 325(65) MG
325 TABLET ORAL 2 TIMES DAILY
Qty: 60 TABLET | Refills: 5 | Status: SHIPPED | OUTPATIENT
Start: 2020-05-08 | End: 2021-04-29

## 2020-05-08 RX ORDER — SODIUM CHLORIDE 9 MG/ML
INJECTION, SOLUTION INTRAVENOUS CONTINUOUS PRN
Status: DISCONTINUED | OUTPATIENT
Start: 2020-05-08 | End: 2020-05-08 | Stop reason: SDUPTHER

## 2020-05-08 RX ORDER — ONDANSETRON 2 MG/ML
4 INJECTION INTRAMUSCULAR; INTRAVENOUS
Status: DISCONTINUED | OUTPATIENT
Start: 2020-05-08 | End: 2020-05-08

## 2020-05-08 RX ADMIN — PROPOFOL 140 MCG/KG/MIN: 10 INJECTION, EMULSION INTRAVENOUS at 09:23

## 2020-05-08 RX ADMIN — POLYETHYLENE GLYCOL 3350, SODIUM SULFATE, SODIUM CHLORIDE, POTASSIUM CHLORIDE, ASCORBIC ACID, SODIUM ASCORBATE 100 G: KIT at 04:10

## 2020-05-08 RX ADMIN — Medication 10 ML: at 08:21

## 2020-05-08 RX ADMIN — SODIUM CHLORIDE: 9 INJECTION, SOLUTION INTRAVENOUS at 09:29

## 2020-05-08 RX ADMIN — LIDOCAINE HYDROCHLORIDE 60 MG: 20 INJECTION, SOLUTION INFILTRATION; PERINEURAL at 09:21

## 2020-05-08 RX ADMIN — PROPOFOL 50 MG: 10 INJECTION, EMULSION INTRAVENOUS at 09:20

## 2020-05-08 RX ADMIN — AMLODIPINE BESYLATE 5 MG: 5 TABLET ORAL at 08:21

## 2020-05-08 RX ADMIN — SODIUM CHLORIDE: 9 INJECTION, SOLUTION INTRAVENOUS at 09:10

## 2020-05-08 ASSESSMENT — PAIN SCALES - GENERAL
PAINLEVEL_OUTOF10: 0

## 2020-05-08 ASSESSMENT — PAIN - FUNCTIONAL ASSESSMENT: PAIN_FUNCTIONAL_ASSESSMENT: 0-10

## 2020-05-08 NOTE — PROGRESS NOTES
Pt ready for transfer to room 325  Admitting and 3A RN called. Pt's belongings taken up with the pt.
Shift assessment complete. Evening medications given, see MAR for details. Moviprep started. Pt refuses bed alarm. Up to [facility] independently. Non-skid socks on. Pt denies any pain or needs at this time. Pt resting in bed with no signs of distress. Will continue to monitor. Call light within reach.
medications for this encounter. Pre-Sedation:    Pre-Sedation Documentation and Exam:  I have personally completed a history, physical exam & review of systems for this patient (see notes). Prior History of Anesthesia Complications:   none    Modified Mallampati:  II (soft palate, uvula, fauces visible)    ASA Classification:  Class 2 - A normal healthy patient with mild systemic disease      Shey Scale: Activity:  2 - Able to move 4 extremities voluntarily on command  Respiration:  2 - Able to breathe deeply and cough freely  Circulation:  2 - BP+/- 20mmHg of normal  Consciousness:  2 - Fully awake  Oxygen Saturation (color):  2 - Able to maintain oxygen saturation >92% on room air    Sedation/Anesthesia Plan:  Guard the patient's safety and welfare. Minimize physical discomfort and pain. Minimize negative psychological responses to treatment by providing sedation and analgesia and maximize the potential amnesia. Patient to meet pre-procedure discharge plan.     Medication Planned:  midazolam intravenously and fentanyl intravenously    Patient is an appropriate candidate for plan of sedation: yes      Electronically signed by Sun Stringer MD on 5/7/2020 at 8:37 AM

## 2020-05-08 NOTE — ANESTHESIA PRE PROCEDURE
[id number] 42 12/04/2015    [id number] 22.3 12/04/2015    BEART -4 12/04/2015    [id number] 92 12/04/2015      Type & Screen (If Applicable)  No results found for: LABABO, LABRH                         BMI: Wt Readings from Last 3 Encounters:       NPO Status: 8 hours                          Anesthesia Evaluation  Patient summary reviewed no history of anesthetic complications:   Airway: Mallampati: III  TM distance: >3 FB   Neck ROM: full   Dental:    (+) partials      Pulmonary:normal exam    (+) sleep apnea: on CPAP,                             Cardiovascular:  Exercise tolerance: poor (<4 METS),   (+) hypertension:, valvular problems/murmurs (s/p AVR 5 years ago): AS, angina: no interval change, pacemaker (Loop recorder):, CAD (EF 60):, CABG/stent (Stents):, dysrhythmias: atrial fibrillation,       ECG reviewed  Rhythm: regular  Rate: normal  Echocardiogram reviewed         Beta Blocker:  Not on Beta Blocker         Neuro/Psych:   (+) CVA (2019, left sided weakness):,             GI/Hepatic/Renal:   (+) GERD:, bowel prep,           Endo/Other:    (+) blood dyscrasia (plavix): anticoagulation therapy and anemia:., .                 Abdominal:           Vascular: negative vascular ROS. Anesthesia Plan      MAC     ASA 3       Induction: intravenous. Anesthetic plan and risks discussed with patient. Plan discussed with CRNA. This pre-anesthesia assessment may be used as a history and physical.    DOS STAFF ADDENDUM:    Pt seen and examined, chart reviewed (including anesthesia, drug and allergy history). No interval changes to history and physical examination. Anesthetic plan, risks, benefits, alternatives, and personnel involved discussed with patient. Questions and concerns addressed. Patient(family) verbalized an understanding and agrees to proceed.       Tahira Peralta MD  May 8, 2020  8:41 AM

## 2020-05-15 NOTE — PROGRESS NOTES
[address]   Cardiac Follow up    Referring Provider:  Darrell Mariscal DO   Chief Complaint   Patient presents with    Follow-Up from Hospital     No Complaints     Coronary Artery Disease    Atrial Fibrillation    f/up cath and marked iron deficiency  History of Present Illness:   Sanju Alvarado has a history of porcine aortic valve replacement and paroxysmal a fib. She has had a CVA. Ms Jael Etienne states she has had 2 strokes since last seen. She states she was at [facility] then . She did inpatient rehab at [address] [facility] in August, 2019.  [facility] implanted a ILR 8/9/19. Today, She states she feels much better since her hospital discharge. She has been taking her iron supplements without side effects. Denies any blood in her stool or bleeding. She states she has not been taking Crestor, denies any complaints of side effects. Denies exertional chest pain, FARRIS/PND, palpitations, light-headedness, edema. She is asking if she should follow up and have an capsule endoscopy. Past Medical History:   has a past medical history of Aortic valve disease, CAD (coronary artery disease), CVA (cerebral infarction), and Hypertension. Surgical History:   has a past surgical history that includes Coronary angioplasty with stent (12/22/2004); Total knee arthroplasty (Right, 7/15/2013); Cataract removal with implant (Left, 10/9/2015); Cardiac catheterization (11/19/2015); Aortic valve replacement (12/4/2015); Upper gastrointestinal endoscopy (N/A, 5/8/2020); and Colonoscopy (N/A, 5/8/2020). Social History:   reports that she has never smoked. She has never used smokeless tobacco. She reports that she does not drink alcohol or use drugs. Family History:  family history includes Heart Disease in her father. Home Medications:  Prior to Admission medications    Medication Sig Start Date End Date Taking?  Authorizing Provider   lisinopril (PRINIVIL;ZESTRIL) 10 MG tablet Take 1 tablet by mouth daily 5/8/20  Yes Frandy Holley MD   ferrous sulfate (IRON 325) 325 (65 Fe) MG tablet Take 1 tablet by mouth 2 times daily 5/8/20  Yes Frandy Holley MD   pantoprazole (PROTONIX) 40 MG tablet Take 1 tablet by mouth every morning (before breakfast) 5/8/20  Yes Frandy Holley MD   clopidogrel (PLAVIX) 75 MG tablet Take 1 tablet by mouth daily 4/21/20  Yes Frandy Holley MD   rosuvastatin (CRESTOR) 5 MG tablet Take 1 tablet by mouth daily 10/8/19  Yes Frandy Holley MD   amLODIPine (NORVASC) 5 MG tablet Take 1 tablet by mouth daily 10/8/19  Yes Frandy Holley MD   aspirin 81 MG tablet Take 1 tablet by mouth daily 8/8/19  Yes Margarita Treviño MD   Coenzyme Q10 (COQ-10 PO) Take by mouth   Yes Historical Provider, MD   therapeutic multivitamin-minerals (THERAGRAN-M) tablet Take 1 tablet by mouth daily. Yes Historical Provider, MD      Allergies:  Lipitor and Penicillins     Review of Systems:   · Constitutional: there has been no unanticipated weight loss. There's been no change in energy level, sleep pattern, or activity level. · Eyes: No visual changes or diplopia. No scleral icterus. · ENT: No Headaches, hearing loss or vertigo. No mouth sores or sore throat. · Cardiovascular: Reviewed in HPI  · Respiratory: No cough or wheezing, no sputum production. No hematemesis. · Gastrointestinal: No abdominal pain, appetite loss, blood in stools. No change in bowel or bladder habits. · Genitourinary: No dysuria, trouble voiding, or hematuria. · Musculoskeletal:  No gait disturbance, weakness or joint complaints. · Integumentary: No rash or pruritis. · Neurological: No headache, diplopia, change in muscle strength, numbness or tingling. No change in gait, balance, coordination, mood, affect, memory, mentation, behavior. · Psychiatric: No anxiety, no depression. · Endocrine: No malaise, fatigue or temperature intolerance. No excessive thirst, fluid intake, or urination. No tremor.   · Hematologic/Lymphatic: No abnormal bruising or bleeding, blood clots or swollen lymph nodes. · Allergic/Immunologic: No nasal congestion or hives. Physical Examination:    Blood pressure (!) 152/80, pulse 75, resp. rate 18, height 5' 3\" (1.6 m), weight 186 lb 12.8 oz (84.7 kg), SpO2 98 %, not currently breastfeeding. Constitutional and General Appearance: alert,oriented, no acute distress  Skin:good turgor,intact without lesions  HEENT: EOMI ,normal  Neck:no JVD    Respiratory:  · Normal excursion and expansion without use of accessory muscles  · Resp Auscultation: Normal breath sounds without dullness  Cardiovascular:  · The apical impulses not displaced  · II/IV JIGAR, Diastolic Murmur  - worsening   · Cervical veins are not engorged  · The carotid upstroke is normal in amplitude and contour without delay or bruit  · Peripheral pulses are symmetrical and full  · There is no clubbing, cyanosis of the extremities. · No edema  · Femoral Arteries: 2+ and equal  · Pedal Pulses: 2+ and equal   Abdomen:  · No masses or tenderness  · Liver/Spleen: No Abnormalities Noted  Neurological/Psychiatric:  · Alert and oriented in all spheres  · Moves all extremities well  · Exhibits normal gait balance and coordination  · No abnormalities of mood, affect, memory, mentation, or behavior are noted    ECHO 5/31/19  Normal left ventricle size, wall thickness and systolic function with an   estimated ejection fraction of 60%. No regional wall motion abnormalities   are seen.   E/e\"= 12. Diastolic filling parameters suggests grade I diastolic   dysfunction.   Mild to moderate mitral regurgitation   The left atrium is mildly dilated.   A bioprosthetic aortic valve (21mm Mosaic Ultra) appears well seated with a   maximum gradient of 27 mmHg and a mean gradient of 17 mmHg. Aortic valve   area of 1.4cm2. Mild aortic regurgitation.   Mild tricuspid regurgitation.  PASP 24mmHg.   IVC size is normal (<2.1cm) and collapses > 50% with respiration consistent   with normal RA months. I appreciate the opportunity of cooperating in the care of this individual.    Rosmery Suarez. Love Laurent M.D., [facility]    Patient's problem list, medications, allergies, past medical, surgical, social and family histories were reviewed and updated as appropriate. Scribe's attestation: This note was scribed in the presence of Dr. Love Laurent MD, by Rusty Hsu RN. The scribe's documentation has been prepared under my direction and personally reviewed by me in its entirety. I confirm that the note above accurately reflects all work, treatment, procedures, and medical decision making performed by me.

## 2020-05-19 ENCOUNTER — HOSPITAL ENCOUNTER (OUTPATIENT)
Age: 85
Discharge: HOME | End: 2020-05-19
Payer: MEDICARE

## 2020-05-19 LAB
HCT VFR BLD CALC: 35.2 % (ref 36–48)
HEMOGLOBIN: 10.9 G/DL (ref 12–16)
MCH RBC QN AUTO: 23.8 PG (ref 26–34)
MCHC RBC AUTO-ENTMCNC: 31.1 G/DL (ref 31–36)
MCV RBC AUTO: 76.6 FL (ref 80–100)
PDW BLD-RTO: 25.3 % (ref 12.4–15.4)
PLATELET # BLD: 256 K/UL (ref 135–450)
PMV BLD AUTO: 10.1 FL (ref 5–10.5)
RBC # BLD: 4.59 M/UL (ref 4–5.2)
WBC # BLD: 3.2 K/UL (ref 4–11)

## 2020-05-19 PROCEDURE — 85027 COMPLETE CBC AUTOMATED: CPT

## 2020-05-19 PROCEDURE — 36415 COLL VENOUS BLD VENIPUNCTURE: CPT

## 2020-05-21 ENCOUNTER — OFFICE VISIT (OUTPATIENT)
Dept: CARDIOLOGY CLINIC | Age: 85
End: 2020-05-21
Payer: MEDICARE

## 2020-05-21 VITALS
RESPIRATION RATE: 18 BRPM | HEIGHT: 63 IN | OXYGEN SATURATION: 98 % | DIASTOLIC BLOOD PRESSURE: 80 MMHG | HEART RATE: 75 BPM | SYSTOLIC BLOOD PRESSURE: 152 MMHG | BODY MASS INDEX: 33.1 KG/M2 | WEIGHT: 186.8 LBS

## 2020-05-21 PROCEDURE — 1111F DSCHRG MED/CURRENT MED MERGE: CPT | Performed by: INTERNAL MEDICINE

## 2020-05-21 PROCEDURE — 99214 OFFICE O/P EST MOD 30 MIN: CPT | Performed by: INTERNAL MEDICINE

## 2020-05-21 RX ORDER — ROSUVASTATIN CALCIUM 5 MG/1
5 TABLET, COATED ORAL DAILY
Qty: 90 TABLET | Refills: 3 | Status: SHIPPED | OUTPATIENT
Start: 2020-05-21 | End: 2020-07-23

## 2020-05-21 NOTE — PATIENT INSTRUCTIONS
Labs and office visit in 2-3 months     Start taking Crestor daily - in 2 month have your cholesterol checked.

## 2020-06-08 ENCOUNTER — NURSE ONLY (OUTPATIENT)
Dept: CARDIOLOGY CLINIC | Age: 85
End: 2020-06-08
Payer: MEDICARE

## 2020-06-08 PROCEDURE — G2066 INTER DEVC REMOTE 30D: HCPCS | Performed by: INTERNAL MEDICINE

## 2020-06-08 PROCEDURE — 93298 REM INTERROG DEV EVAL SCRMS: CPT | Performed by: INTERNAL MEDICINE

## 2020-07-13 ENCOUNTER — NURSE ONLY (OUTPATIENT)
Dept: CARDIOLOGY CLINIC | Age: 85
End: 2020-07-13
Payer: MEDICARE

## 2020-07-13 PROCEDURE — G2066 INTER DEVC REMOTE 30D: HCPCS | Performed by: INTERNAL MEDICINE

## 2020-07-13 PROCEDURE — 93298 REM INTERROG DEV EVAL SCRMS: CPT | Performed by: INTERNAL MEDICINE

## 2020-07-13 NOTE — PROGRESS NOTES
[facility] remote Linq report shows NSR with ectopy and short SVT. Pt showed no symptoms. We will continue to monitor remotely.

## 2020-07-13 NOTE — LETTER
[address] [facility] [phone]  [address] [facility] [phone]    Pacemaker/Defibrillator Clinic          07/13/20        Janann Klinefelter Rudder  [address] [facility] [address] [place] [id number]        Dear Janann Klinefelter Rudder    This letter is to inform you that we received the transmission from your monitor at home that checks your implanted heart device. The next date your monitor will automatically transmit will be 8-17-20. If your report needs attention we will notify you. Your device and monitor are wireless and most transmit cellularly, but please periodically check your monitor is still plugged in to the electrical outlet. If you still use the telephone land line to send please ensure the connection to the phone missy is secure. This will help to ensure successful automatic transmissions in the future. Also, the monitor needs to be close to you while sleeping at night. Please be aware that the remote device transmission sites are periodically monitored only during regular business hours during which simultaneous in-office device clinics are being run. If your transmission requires attention, we will contact you as soon as possible. Thank you.             [facility]

## 2020-07-16 ENCOUNTER — TELEPHONE (OUTPATIENT)
Dept: CARDIOLOGY CLINIC | Age: 85
End: 2020-07-16

## 2020-07-16 NOTE — TELEPHONE ENCOUNTER
Pt calling needs lab orders faxed to [facility] pt has an appt on 07/23 with LES and needs to get these done prior. Pls call pt when this is done so she can go there.  Thank you

## 2020-07-22 NOTE — PROGRESS NOTES
(65 Fe) MG tablet Take 1 tablet by mouth 2 times daily 5/8/20  Yes Shirin Garza MD   pantoprazole (PROTONIX) 40 MG tablet Take 1 tablet by mouth every morning (before breakfast) 5/8/20  Yes Shirin Garza MD   amLODIPine (NORVASC) 5 MG tablet Take 1 tablet by mouth daily 10/8/19  Yes Shirin Garza MD   aspirin 81 MG tablet Take 1 tablet by mouth daily 8/8/19  Yes Bryan Eldridge MD   Coenzyme Q10 (COQ-10 PO) Take by mouth   Yes Historical Provider, MD   therapeutic multivitamin-minerals (THERAGRAN-M) tablet Take 1 tablet by mouth daily. Yes Historical Provider, MD      Allergies:  Lipitor and Penicillins     Review of Systems:   · Constitutional: there has been no unanticipated weight loss. There's been no change in energy level, sleep pattern, or activity level. · Eyes: No visual changes or diplopia. No scleral icterus. · ENT: No Headaches, hearing loss or vertigo. No mouth sores or sore throat. · Cardiovascular: Reviewed in HPI  · Respiratory: No cough or wheezing, no sputum production. No hematemesis. · Gastrointestinal: No abdominal pain, appetite loss, blood in stools. No change in bowel or bladder habits. · Genitourinary: No dysuria, trouble voiding, or hematuria. · Musculoskeletal:  No gait disturbance, weakness or joint complaints. · Integumentary: No rash or pruritis. · Neurological: No headache, diplopia, change in muscle strength, numbness or tingling. No change in gait, balance, coordination, mood, affect, memory, mentation, behavior. · Psychiatric: No anxiety, no depression. · Endocrine: No malaise, fatigue or temperature intolerance. No excessive thirst, fluid intake, or urination. No tremor. · Hematologic/Lymphatic: No abnormal bruising or bleeding, blood clots or swollen lymph nodes. · Allergic/Immunologic: No nasal congestion or hives.     Physical Examination:    Blood pressure (!) 138/100, pulse 64, temperature 97 °F (36.1 °C), height 5' 3\" (1.6 m), weight 186 lb 6.4 oz (84.6 kg), not currently breastfeeding. Constitutional and General Appearance: alert,oriented, no acute distress  Skin:good turgor,intact without lesions  HEENT: EOMI ,normal  Neck:no JVD    Respiratory:  · Normal excursion and expansion without use of accessory muscles  · Resp Auscultation: Normal breath sounds without dullness  Cardiovascular:  · The apical impulses not displaced  · II/IV JIGAR, Diastolic Murmur  - worsening   · Cervical veins are not engorged  · The carotid upstroke is normal in amplitude and contour without delay or bruit  · Peripheral pulses are symmetrical and full  · There is no clubbing, cyanosis of the extremities. · No edema  · Femoral Arteries: 2+ and equal  · Pedal Pulses: 2+ and equal   Abdomen:  · No masses or tenderness  · Liver/Spleen: No Abnormalities Noted  Neurological/Psychiatric:  · Alert and oriented in all spheres  · Moves all extremities well  · Exhibits normal gait balance and coordination  · No abnormalities of mood, affect, memory, mentation, or behavior are noted    EGD 5/8/2020 (Dr. Frandy Robles)  Large hiatal hernia, few gastric erosions, bx for H. Pylori    Colonoscopy 5/8/2020 (Dr. Frandy Robles)  Small rectal polyp removed,sigmoid diverticulosis, small hemorrhoids    LHC 5/7/2020  Cath with 2 plus AI,no significant AV gradient. LVEDP 25 c/w high output CHF  EF 60  40% left main  40% in stent LAD,40% ostial CFX  30% RCA     Imp- high output CHF due to symptomatic anemia     Will give 1 unit of PRBC,IV iron  GI evaluation     ECHO 5/31/19  Normal left ventricle size, wall thickness and systolic function with an   estimated ejection fraction of 60%.  No regional wall motion abnormalities   are seen.   E/e\"= 12. Diastolic filling parameters suggests grade I diastolic   dysfunction.   Mild to moderate mitral regurgitation   The left atrium is mildly dilated.   A bioprosthetic aortic valve (21mm Mosaic Ultra) appears well seated with a   maximum gradient of 27 mmHg and a mean gradient of 17 mmHg. Aortic valve   area of 1.4cm2. Mild aortic regurgitation.   Mild tricuspid regurgitation. PASP 24mmHg.   IVC size is normal (<2.1cm) and collapses > 50% with respiration consistent   with normal RA pressure (3mmHg).    Assessment:    CAD  No anginal symptoms. -[facility] 2004> PCI of proximal LAD  -[facility] 2013> Cath with LM normal, LAD mid 30%, distal 40%, D1-40%, D4-50%(small), widely patent proximal stent, Cx Ostial 20%. RCA Mid 30% X2 and LVG 60%. Medically managed  -[facility] 2015> No flow-significant coronary stenoses are identified. The left anterior descending stent is widely patent. -[facility] 5/7/20> 2+ AI, EF 60, 40% LM, 40% in-stent LAD,40% ostial CFX, 30% RCA, Imp- high output CHF due to symptomatic anemia     Aortic Valve Replacement   12/4/15 AVR Mosaic porcine performed by Dr. João Alonzo. Taking baby asa    Hypertension  Will continue to monitor  Blood pressure (!) 138/100, pulse 64, temperature 97 °F (36.1 °C), height 5' 3\" (1.6 m), weight 186 lb 6.4 oz (84.6 kg)    Atrial fibrillation, history of  Has ILR now implanted by Dr Crista Zafar. Recent check did not reveal any arrhythmia  Detected while exercising during cardiac rehab  High dose Xarelto gave her bruising. Takes baby asa. EKG 9/26/16> A. fib  EKG 7/3/18> Sinus rhythm - a fib resolved. EKG 5/7/20> NSR 78     Hyperlipidemia  7/23/2020> , TRIG 88, HDL 74,   Taking Crestor 5mg, but admittedly not consistently. She states she had hives from Lipitor. Anemia, history of   6/17/2020> H&H 12.2/37/7  5/19/20> H&H 10.9/35.2  5/7/20> H&H 8.3/27.0  Continue PO iron           Plan:   Fariba Diggs has a stable cardiac status. All cardiac test and lab results were personally reviewed by me during this office visit. 1. No med changes  2. CBC, Fe/TIBC, ferritin in 3 months  3. Will continue with risk factor modification  4.    Return to office same day as her  9/22/2020     I appreciate the opportunity of cooperating in the care of this

## 2020-07-23 ENCOUNTER — OFFICE VISIT (OUTPATIENT)
Dept: CARDIOLOGY CLINIC | Age: 85
End: 2020-07-23
Payer: MEDICARE

## 2020-07-23 VITALS
SYSTOLIC BLOOD PRESSURE: 138 MMHG | BODY MASS INDEX: 33.03 KG/M2 | WEIGHT: 186.4 LBS | HEIGHT: 63 IN | TEMPERATURE: 97 F | HEART RATE: 64 BPM | DIASTOLIC BLOOD PRESSURE: 100 MMHG

## 2020-07-23 PROBLEM — E78.5 HYPERLIPIDEMIA: Status: ACTIVE | Noted: 2020-07-23

## 2020-07-23 LAB
ALT SERPL-CCNC: 14 IU/L (ref 10–35)
AST SERPL-CCNC: 19 IU/L (ref 10–35)
BASOPHILS ABSOLUTE: 0.07 THOU/MCL (ref 0–0.2)
BASOPHILS ABSOLUTE: 2 %
CHOLESTEROL, TOTAL: 230 MG/DL
EOSINOPHILS ABSOLUTE: 0.11 THOU/MCL (ref 0.03–0.45)
EOSINOPHILS RELATIVE PERCENT: 3 %
FERRITIN: 58 NG/ML (ref 30–400)
HCT VFR BLD CALC: 37.6 % (ref 36–46)
HDLC SERPL-MCNC: 74 MG/DL
HEMOGLOBIN: 12.4 G/DL (ref 12–15.2)
IRON SATURATION: 14 % (ref 16–60)
IRON: 50 MCG/DL (ref 37–145)
LDL CHOLESTEROL CALCULATED: 138 MG/DL
LYMPHOCYTES ABSOLUTE: 0.89 THOU/MCL (ref 1–4)
LYMPHOCYTES RELATIVE PERCENT: 24 %
MCH RBC QN AUTO: 29.3 PG (ref 27–33)
MCHC RBC AUTO-ENTMCNC: 32.8 G/DL (ref 32–36)
MCV RBC AUTO: 89.3 FL (ref 82–97)
MONOCYTES # BLD: 9 %
MONOCYTES ABSOLUTE: 0.33 THOU/MCL (ref 0.2–0.9)
NEUTROPHILS ABSOLUTE: 2.3 THOU/MCL (ref 1.8–7.7)
NONHDLC SERPL-MCNC: 156 MG/DL
PDW BLD-RTO: 22.4 %
PLATELET # BLD: 179 THOU/MCL (ref 140–375)
PLATELET ESTIMATE: ABNORMAL
PMV BLD AUTO: 9.7 FL (ref 7.4–11.5)
RBC # BLD: 4.21 MIL/MCL (ref 3.8–5.2)
SEG NEUTROPHILS: 62 %
TOTAL IRON BINDING CAPACITY: 355 MCG/DL (ref 250–400)
TRANSFERRIN: 248 MG/DL (ref 192–382)
TRIGL SERPL-MCNC: 88 MG/DL
WBC # BLD: 3.7 THOU/MCL (ref 3.6–10.5)

## 2020-07-23 PROCEDURE — 99214 OFFICE O/P EST MOD 30 MIN: CPT | Performed by: INTERNAL MEDICINE

## 2020-08-17 ENCOUNTER — NURSE ONLY (OUTPATIENT)
Dept: CARDIOLOGY CLINIC | Age: 85
End: 2020-08-17
Payer: MEDICARE

## 2020-08-17 PROCEDURE — G2066 INTER DEVC REMOTE 30D: HCPCS | Performed by: INTERNAL MEDICINE

## 2020-08-17 PROCEDURE — 93298 REM INTERROG DEV EVAL SCRMS: CPT | Performed by: INTERNAL MEDICINE

## 2020-08-17 NOTE — PROGRESS NOTES
We received a remote transmission form patient's monitor at home. Remote Linq report shows possible AF. Will have EP physician review. Pt is not on anti coags. We will continue to monitor remotely.

## 2020-08-19 NOTE — PROGRESS NOTES
D/t afib on device and uncontrolled rate MXA wants patient to start on metoprolol. Will start 25 mg BID. Sent to patients pharmacy. Call placed to patient no answer LM    She called back 8/20/2020   Discussed that her blood pressure and heart rate have been low at times. Discontinued the order for metoprolol and discussed symptoms of hypotension and bradycardia.  She will discuss this further at her appointment with GALEN 9/22/2020

## 2020-08-21 ENCOUNTER — TELEPHONE (OUTPATIENT)
Dept: CARDIOLOGY | Age: 85
End: 2020-08-21

## 2020-08-21 NOTE — TELEPHONE ENCOUNTER
----- Message from Robin Watts sent at 8/18/2020  6:52 AM EDT -----  Dr. Carilyn Frankel, can you please review IRL. AF episode noted on 8/15/2020 lasing 20 minutes. Patient not on AC. Thanks  Leobardo Lopez  ----- Message -----  From: Davin Mccord  Sent: 8/17/2020   2:01 PM EDT  To: Robin Watts    Can you show this to Dr. Carilyn Frankel. Questionable AF. Not on anti coags.  Thanks

## 2020-08-24 NOTE — TELEPHONE ENCOUNTER
Discussed with Dr. Bk Collado and she is very high risk for stroke due to history and [id number] (age, gender, HTM, CA, CVA). AC is recommended. Called patient and discussed options. SHe would like to resume Xarelto. Discussed hx of GI bleeding and s/s to monitor for. Also recommended CBC  At the time of her office follow up with Dr. Rafita Valdivia 9/22. If she has recurrent bleeding or cannot tolerate long-term AC will discuss Watchman procedure. Will have her follow up with Dr. Tj Lynch in 3 months.       Nena Kaplan, APRN-CNP

## 2020-08-24 NOTE — TELEPHONE ENCOUNTER
Called and updated to have CBC done in 2 weeks vs 4 and she is agreeable. Would like to have order faxed to [facility] lab due to location. Will arrange.      LADAN Wood-CNP

## 2020-09-18 LAB
ALT SERPL-CCNC: 15 IU/L (ref 10–35)
AST SERPL-CCNC: 20 IU/L (ref 10–35)
BASOPHILS ABSOLUTE: 0 THOU/MCL (ref 0–0.2)
BASOPHILS ABSOLUTE: 1 %
CHOLESTEROL, TOTAL: 244 MG/DL
EOSINOPHILS ABSOLUTE: 0.1 THOU/MCL (ref 0.03–0.45)
EOSINOPHILS RELATIVE PERCENT: 2 %
FERRITIN: 62 NG/ML (ref 30–400)
HCT VFR BLD CALC: 40.3 % (ref 36–46)
HDLC SERPL-MCNC: 79 MG/DL
HEMOGLOBIN: 13.3 G/DL (ref 12–15.2)
IRON SATURATION: 20 % (ref 16–60)
IRON: 73 MCG/DL (ref 37–145)
LDL CHOLESTEROL CALCULATED: 149 MG/DL
LYMPHOCYTES ABSOLUTE: 0.8 THOU/MCL (ref 1–4)
LYMPHOCYTES RELATIVE PERCENT: 22 %
MCH RBC QN AUTO: 30.9 PG (ref 27–33)
MCHC RBC AUTO-ENTMCNC: 32.9 G/DL (ref 32–36)
MCV RBC AUTO: 94 FL (ref 82–97)
MONOCYTES # BLD: 11 %
MONOCYTES ABSOLUTE: 0.4 THOU/MCL (ref 0.2–0.9)
NEUTROPHILS ABSOLUTE: 2.2 THOU/MCL (ref 1.8–7.7)
NONHDLC SERPL-MCNC: 165 MG/DL
PDW BLD-RTO: 15.4 %
PLATELET # BLD: 178 THOU/MCL (ref 140–375)
PMV BLD AUTO: 10.5 FL (ref 7.4–11.5)
RBC # BLD: 4.29 MIL/MCL (ref 3.8–5.2)
SEG NEUTROPHILS: 64 %
TOTAL IRON BINDING CAPACITY: 363 MCG/DL (ref 250–400)
TRANSFERRIN: 254 MG/DL (ref 192–382)
TRIGL SERPL-MCNC: 82 MG/DL
WBC # BLD: 3.4 THOU/MCL (ref 3.6–10.5)

## 2020-09-21 ENCOUNTER — NURSE ONLY (OUTPATIENT)
Dept: CARDIOLOGY CLINIC | Age: 85
End: 2020-09-21
Payer: MEDICARE

## 2020-09-21 PROCEDURE — G2066 INTER DEVC REMOTE 30D: HCPCS | Performed by: INTERNAL MEDICINE

## 2020-09-21 PROCEDURE — 93298 REM INTERROG DEV EVAL SCRMS: CPT | Performed by: INTERNAL MEDICINE

## 2020-09-21 NOTE — LETTER
[address] [facility] [phone]  [address] [facility] [phone]    Pacemaker/Defibrillator Clinic          09/21/20        Alfredo Eid  [address] [facility] [address] [facility] [address]        Dear Alfredo Eid    This letter is to inform you that we received the transmission from your monitor at home that checks your implanted heart device. The next date your monitor will automatically transmit will be 10-26-20. If your report needs attention we will notify you. Your device and monitor are wireless and most transmit cellularly, but please periodically check your monitor is still plugged in to the electrical outlet. If you still use the telephone land line to send please ensure the connection to the phone missy is secure. This will help to ensure successful automatic transmissions in the future. Also, the monitor needs to be close to you while sleeping at night. Please be aware that the remote device transmission sites are periodically monitored only during regular business hours during which simultaneous in-office device clinics are being run. If your transmission requires attention, we will contact you as soon as possible. Thank you.             [address]

## 2020-09-21 NOTE — PROGRESS NOTES
We received a remote transmission form patient's monitor at home. Remote Linq report shows known AF, 1 with RVR. Pt remains on Xarelto. EP physician to review. We will continue to monitor remotely.

## 2020-09-22 ENCOUNTER — OFFICE VISIT (OUTPATIENT)
Dept: CARDIOLOGY CLINIC | Age: 85
End: 2020-09-22
Payer: MEDICARE

## 2020-09-22 VITALS
HEART RATE: 74 BPM | OXYGEN SATURATION: 99 % | BODY MASS INDEX: 33.13 KG/M2 | DIASTOLIC BLOOD PRESSURE: 94 MMHG | HEIGHT: 63 IN | SYSTOLIC BLOOD PRESSURE: 160 MMHG | WEIGHT: 187 LBS

## 2020-09-22 PROCEDURE — 99214 OFFICE O/P EST MOD 30 MIN: CPT | Performed by: INTERNAL MEDICINE

## 2020-09-22 NOTE — PROGRESS NOTES
[address]   Cardiac Follow up    Referring Provider:  Hamzah Caballero DO   Chief Complaint   Patient presents with    Coronary Artery Disease    Hyperlipidemia    Hypertension     History of Present Illness:   Ed Palma has a history of porcine aortic valve replacement and paroxysmal a fib. She has had a CVA. Ms Olivier Brooks states she has had 2 strokes prior to last visit; she states she was at [facility] then . She did inpatient rehab at [facility] in August, 2019. Dr. Asael Narayan implanted a ILR 8/9/19. She was admitted May 2020 with chest tightness and dyspnea. LHC showed high output CHF due to symptomatic anemia. Taken off Plavix and had GI consult with EGD/colonoscopy. Today, she states that she did not start Xarelto due to the cost. Long discussion regarding the risks vs benefits, and her prior history of strokes. She denies exertional chest pain, FARRIS/PND, light-headedness, edema. She is not very active, does not smoke. She and her  want to fly to [place] in October but not sure if they can go due to the Covid crisis. Past Medical History:   has a past medical history of Aortic valve disease, CAD (coronary artery disease), CVA (cerebral infarction), and Hypertension. Surgical History:   has a past surgical history that includes Coronary angioplasty with stent (12/22/2004); Total knee arthroplasty (Right, 7/15/2013); Cataract removal with implant (Left, 10/9/2015); Cardiac catheterization (11/19/2015); Aortic valve replacement (12/4/2015); Upper gastrointestinal endoscopy (N/A, 5/8/2020); and Colonoscopy (N/A, 5/8/2020). Social History:   reports that she has never smoked. She has never used smokeless tobacco. She reports that she does not drink alcohol or use drugs. Family History:  family history includes Heart Disease in her father. Home Medications:  Prior to Admission medications    Medication Sig Start Date End Date Taking?  Authorizing Provider   Rosuvastatin anxiety, no depression. · Endocrine: No malaise, fatigue or temperature intolerance. No excessive thirst, fluid intake, or urination. No tremor. · Hematologic/Lymphatic: No abnormal bruising or bleeding, blood clots or swollen lymph nodes. · Allergic/Immunologic: No nasal congestion or hives. Physical Examination:    Blood pressure (!) 160/94, pulse 74, height 5' 3\" (1.6 m), weight 187 lb (84.8 kg), SpO2 99 %, not currently breastfeeding. Constitutional and General Appearance: alert,oriented, no acute distress  Skin:good turgor,intact without lesions  HEENT: EOMI ,normal  Neck:no JVD    Respiratory:  · Normal excursion and expansion without use of accessory muscles  · Resp Auscultation: Normal breath sounds without dullness  Cardiovascular:  · The apical impulses not displaced  · II/IV JIGAR, Diastolic Murmur  - worsening   · Cervical veins are not engorged  · The carotid upstroke is normal in amplitude and contour without delay or bruit  · Peripheral pulses are symmetrical and full  · There is no clubbing, cyanosis of the extremities. · No edema  · Femoral Arteries: 2+ and equal  · Pedal Pulses: 2+ and equal   Abdomen:  · No masses or tenderness  · Liver/Spleen: No Abnormalities Noted  Neurological/Psychiatric:  · Alert and oriented in all spheres  · Moves all extremities well  · Exhibits normal gait balance and coordination  · No abnormalities of mood, affect, memory, mentation, or behavior are noted    EGD 5/8/2020 (Dr. Abdoul Ramírez)  Large hiatal hernia, few gastric erosions, bx for H. Pylori    Colonoscopy 5/8/2020 (Dr. Abdoul Ramírez)  Small rectal polyp removed,sigmoid diverticulosis, small hemorrhoids    LHC 5/7/2020  Cath with 2 plus AI,no significant AV gradient.  LVEDP 25 c/w high output CHF  EF 60  40% left main  40% in stent LAD,40% ostial CFX  30% RCA     Imp- high output CHF due to symptomatic anemia     Will give 1 unit of PRBC,IV iron  GI evaluation     ECHO 5/31/19  Normal left ventricle size, wall thickness and systolic function with an   estimated ejection fraction of 60%. No regional wall motion abnormalities   are seen.   E/e\"= 12. Diastolic filling parameters suggests grade I diastolic   dysfunction.   Mild to moderate mitral regurgitation   The left atrium is mildly dilated.   A bioprosthetic aortic valve (21mm Mosaic Ultra) appears well seated with a   maximum gradient of 27 mmHg and a mean gradient of 17 mmHg. Aortic valve   area of 1.4cm2. Mild aortic regurgitation.   Mild tricuspid regurgitation. PASP 24mmHg.   IVC size is normal (<2.1cm) and collapses > 50% with respiration consistent   with normal RA pressure (3mmHg).    Assessment:    CAD  No anginal symptoms. -[facility] 2004> PCI of proximal LAD  -[facility] 2013> Cath with LM normal, LAD mid 30%, distal 40%, D1-40%, D4-50%(small), widely patent proximal stent, Cx Ostial 20%. RCA Mid 30% X2 and LVG 60%. Medically managed  -[facility] 2015> No flow-significant coronary stenoses are identified. The left anterior descending stent is widely patent. -[facility] 5/7/20> 2+ AI, EF 60, 40% LM, 40% in-stent LAD,40% ostial CFX, 30% RCA, Imp- high output CHF due to symptomatic anemia     Aortic Valve Replacement   12/4/15 AVR Mosaic porcine performed by Dr. Arthur Del Castillo. Taking baby asa    Hypertension  Will continue to monitor  Blood pressure (!) 160/94, pulse 74, height 5' 3\" (1.6 m), weight 187 lb (84.8 kg), SpO2 99 %, not currently breastfeeding. Atrial fibrillation, history of  Has ILR now implanted by Dr Klarissa Connolly. Recent check revealed Afib. Detected while exercising during cardiac rehab  High dose Xarelto gave her bruising. Takes baby asa. EKG 9/26/16> A. fib  EKG 7/3/18> Sinus rhythm - a fib resolved. EKG 5/7/20> NSR 78  Discussed starting xarelto        Hyperlipidemia  7/23/2020> , TRIG 88, HDL 74,   Taking Crestor 5mg, but admittedly not consistently. She states she had hives from Lipitor.     Anemia, history of   6/17/2020> H&H 12.2/37/7  5/19/20> H&H 10.9/35.2  5/7/20> H&H 8.3/27.0  Continue PO iron       Plan:   [address] has a stable cardiac status. All cardiac test and lab results were personally reviewed by me during this office visit. 1. Start Xarelto 15mg daily   2. Return to office same day as her  in 6 months. I appreciate the opportunity of cooperating in the care of this individual.    Flynn Nichole. Tal Toribio M.D., [address] [facility]    Patient's problem list, medications, allergies, past medical, surgical, social and family histories were reviewed and updated as appropriate. Scribe's attestation: This note was scribed in the presence of Dr. Tal Toribio MD, by Bernardo Carlton RN. The scribe's documentation has been prepared under my direction and personally reviewed by me in its entirety. I confirm that the note above accurately reflects all work, treatment, procedures, and medical decision making performed by me.

## 2020-09-30 ENCOUNTER — TELEPHONE (OUTPATIENT)
Dept: CARDIOLOGY CLINIC | Age: 85
End: 2020-09-30

## 2020-09-30 NOTE — TELEPHONE ENCOUNTER
Last night she had a bad episode of afib . She is still in afib now just not as bad as last night . Today she  is dizzy , weak and  Feels like she might faint . What should she do ? She just doesn't feel right.

## 2020-10-01 ENCOUNTER — APPOINTMENT (OUTPATIENT)
Dept: GENERAL RADIOLOGY | Age: 85
End: 2020-10-01
Payer: MEDICARE

## 2020-10-01 ENCOUNTER — APPOINTMENT (OUTPATIENT)
Dept: CT IMAGING | Age: 85
End: 2020-10-01
Payer: MEDICARE

## 2020-10-01 ENCOUNTER — TELEPHONE (OUTPATIENT)
Dept: CARDIOLOGY CLINIC | Age: 85
End: 2020-10-01

## 2020-10-01 ENCOUNTER — HOSPITAL ENCOUNTER (EMERGENCY)
Age: 85
Discharge: ANOTHER HOSPITAL | End: 2020-10-01
Attending: EMERGENCY MEDICINE
Payer: MEDICARE

## 2020-10-01 ENCOUNTER — HOSPITAL ENCOUNTER (OUTPATIENT)
Dept: CARDIAC CATH/INVASIVE PROCEDURES | Age: 85
Discharge: HOME | End: 2020-10-01
Attending: INTERNAL MEDICINE | Admitting: INTERNAL MEDICINE
Payer: MEDICARE

## 2020-10-01 VITALS
DIASTOLIC BLOOD PRESSURE: 95 MMHG | SYSTOLIC BLOOD PRESSURE: 160 MMHG | HEIGHT: 63 IN | BODY MASS INDEX: 33.13 KG/M2 | HEART RATE: 114 BPM | RESPIRATION RATE: 18 BRPM | WEIGHT: 187 LBS

## 2020-10-01 VITALS
RESPIRATION RATE: 18 BRPM | HEIGHT: 63 IN | BODY MASS INDEX: 32.07 KG/M2 | DIASTOLIC BLOOD PRESSURE: 70 MMHG | WEIGHT: 181 LBS | OXYGEN SATURATION: 95 % | HEART RATE: 115 BPM | TEMPERATURE: 97 F | SYSTOLIC BLOOD PRESSURE: 159 MMHG

## 2020-10-01 LAB
A/G RATIO: 1.5 (ref 1.1–2.2)
ALBUMIN SERPL-MCNC: 4.3 G/DL (ref 3.4–5)
ALP BLD-CCNC: 84 U/L (ref 40–129)
ALT SERPL-CCNC: 18 U/L (ref 10–40)
ANION GAP SERPL CALCULATED.3IONS-SCNC: 11 MMOL/L (ref 3–16)
AST SERPL-CCNC: 26 U/L (ref 15–37)
BASOPHILS ABSOLUTE: 0 K/UL (ref 0–0.2)
BASOPHILS RELATIVE PERCENT: 0.4 %
BILIRUB SERPL-MCNC: 0.5 MG/DL (ref 0–1)
BUN BLDV-MCNC: 19 MG/DL (ref 7–20)
CALCIUM SERPL-MCNC: 9.4 MG/DL (ref 8.3–10.6)
CHLORIDE BLD-SCNC: 102 MMOL/L (ref 99–110)
CO2: 24 MMOL/L (ref 21–32)
CREAT SERPL-MCNC: 0.6 MG/DL (ref 0.6–1.2)
EKG ATRIAL RATE: 115 BPM
EKG ATRIAL RATE: 73 BPM
EKG ATRIAL RATE: 93 BPM
EKG DIAGNOSIS: NORMAL
EKG P AXIS: 57 DEGREES
EKG P AXIS: 74 DEGREES
EKG P-R INTERVAL: 160 MS
EKG P-R INTERVAL: 170 MS
EKG Q-T INTERVAL: 300 MS
EKG Q-T INTERVAL: 358 MS
EKG Q-T INTERVAL: 414 MS
EKG QRS DURATION: 78 MS
EKG QRS DURATION: 80 MS
EKG QRS DURATION: 88 MS
EKG QTC CALCULATION (BAZETT): 413 MS
EKG QTC CALCULATION (BAZETT): 445 MS
EKG QTC CALCULATION (BAZETT): 456 MS
EKG R AXIS: -20 DEGREES
EKG R AXIS: -24 DEGREES
EKG R AXIS: -30 DEGREES
EKG T AXIS: 115 DEGREES
EKG T AXIS: 60 DEGREES
EKG T AXIS: 64 DEGREES
EKG VENTRICULAR RATE: 114 BPM
EKG VENTRICULAR RATE: 73 BPM
EKG VENTRICULAR RATE: 93 BPM
EOSINOPHILS ABSOLUTE: 0 K/UL (ref 0–0.6)
EOSINOPHILS RELATIVE PERCENT: 0.1 %
GFR AFRICAN AMERICAN: >60
GFR NON-AFRICAN AMERICAN: >60
GLOBULIN: 2.9 G/DL
GLUCOSE BLD-MCNC: 119 MG/DL (ref 70–99)
HCT VFR BLD CALC: 44.5 % (ref 36–48)
HEMOGLOBIN: 14.6 G/DL (ref 12–16)
INR BLD: 1.04 (ref 0.86–1.14)
LIPASE: 14 U/L (ref 13–60)
LV EF: 58 %
LVEF MODALITY: NORMAL
LYMPHOCYTES ABSOLUTE: 0.5 K/UL (ref 1–5.1)
LYMPHOCYTES RELATIVE PERCENT: 5.2 %
MCH RBC QN AUTO: 31.5 PG (ref 26–34)
MCHC RBC AUTO-ENTMCNC: 32.9 G/DL (ref 31–36)
MCV RBC AUTO: 95.7 FL (ref 80–100)
MONOCYTES ABSOLUTE: 0.4 K/UL (ref 0–1.3)
MONOCYTES RELATIVE PERCENT: 4.1 %
NEUTROPHILS ABSOLUTE: 9.3 K/UL (ref 1.7–7.7)
NEUTROPHILS RELATIVE PERCENT: 90.2 %
PDW BLD-RTO: 15 % (ref 12.4–15.4)
PLATELET # BLD: 186 K/UL (ref 135–450)
PMV BLD AUTO: 10.2 FL (ref 5–10.5)
POTASSIUM REFLEX MAGNESIUM: 4.2 MMOL/L (ref 3.5–5.1)
PROTHROMBIN TIME: 12.1 SEC (ref 10–13.2)
RBC # BLD: 4.65 M/UL (ref 4–5.2)
SODIUM BLD-SCNC: 137 MMOL/L (ref 136–145)
TOTAL PROTEIN: 7.2 G/DL (ref 6.4–8.2)
TROPONIN: <0.01 NG/ML
WBC # BLD: 10.3 K/UL (ref 4–11)

## 2020-10-01 PROCEDURE — 80053 COMPREHEN METABOLIC PANEL: CPT

## 2020-10-01 PROCEDURE — 93325 DOPPLER ECHO COLOR FLOW MAPG: CPT

## 2020-10-01 PROCEDURE — 83690 ASSAY OF LIPASE: CPT

## 2020-10-01 PROCEDURE — 93010 ELECTROCARDIOGRAM REPORT: CPT | Performed by: INTERNAL MEDICINE

## 2020-10-01 PROCEDURE — 93312 ECHO TRANSESOPHAGEAL: CPT

## 2020-10-01 PROCEDURE — 85025 COMPLETE CBC W/AUTO DIFF WBC: CPT

## 2020-10-01 PROCEDURE — 99152 MOD SED SAME PHYS/QHP 5/>YRS: CPT

## 2020-10-01 PROCEDURE — 92960 CARDIOVERSION ELECTRIC EXT: CPT

## 2020-10-01 PROCEDURE — 93312 ECHO TRANSESOPHAGEAL: CPT | Performed by: INTERNAL MEDICINE

## 2020-10-01 PROCEDURE — 92960 CARDIOVERSION ELECTRIC EXT: CPT | Performed by: INTERNAL MEDICINE

## 2020-10-01 PROCEDURE — 6360000002 HC RX W HCPCS: Performed by: PHYSICIAN ASSISTANT

## 2020-10-01 PROCEDURE — 85610 PROTHROMBIN TIME: CPT

## 2020-10-01 PROCEDURE — 6360000002 HC RX W HCPCS: Performed by: INTERNAL MEDICINE

## 2020-10-01 PROCEDURE — 6360000002 HC RX W HCPCS: Performed by: EMERGENCY MEDICINE

## 2020-10-01 PROCEDURE — APPSS60 APP SPLIT SHARED TIME 46-60 MINUTES: Performed by: NURSE PRACTITIONER

## 2020-10-01 PROCEDURE — 96375 TX/PRO/DX INJ NEW DRUG ADDON: CPT

## 2020-10-01 PROCEDURE — C9113 INJ PANTOPRAZOLE SODIUM, VIA: HCPCS | Performed by: PHYSICIAN ASSISTANT

## 2020-10-01 PROCEDURE — 93005 ELECTROCARDIOGRAM TRACING: CPT | Performed by: EMERGENCY MEDICINE

## 2020-10-01 PROCEDURE — 93320 DOPPLER ECHO COMPLETE: CPT

## 2020-10-01 PROCEDURE — 6360000004 HC RX CONTRAST MEDICATION: Performed by: EMERGENCY MEDICINE

## 2020-10-01 PROCEDURE — 7100000010 HC PHASE II RECOVERY - FIRST 15 MIN

## 2020-10-01 PROCEDURE — 71045 X-RAY EXAM CHEST 1 VIEW: CPT

## 2020-10-01 PROCEDURE — 93005 ELECTROCARDIOGRAM TRACING: CPT | Performed by: INTERNAL MEDICINE

## 2020-10-01 PROCEDURE — 71260 CT THORAX DX C+: CPT

## 2020-10-01 PROCEDURE — 84484 ASSAY OF TROPONIN QUANT: CPT

## 2020-10-01 PROCEDURE — 2580000003 HC RX 258: Performed by: EMERGENCY MEDICINE

## 2020-10-01 PROCEDURE — 74177 CT ABD & PELVIS W/CONTRAST: CPT

## 2020-10-01 PROCEDURE — 99285 EMERGENCY DEPT VISIT HI MDM: CPT

## 2020-10-01 PROCEDURE — 36415 COLL VENOUS BLD VENIPUNCTURE: CPT

## 2020-10-01 PROCEDURE — 96365 THER/PROPH/DIAG IV INF INIT: CPT

## 2020-10-01 RX ORDER — PANTOPRAZOLE SODIUM 40 MG/10ML
40 INJECTION, POWDER, LYOPHILIZED, FOR SOLUTION INTRAVENOUS ONCE
Status: COMPLETED | OUTPATIENT
Start: 2020-10-01 | End: 2020-10-01

## 2020-10-01 RX ORDER — MORPHINE SULFATE 2 MG/ML
2 INJECTION, SOLUTION INTRAMUSCULAR; INTRAVENOUS ONCE
Status: COMPLETED | OUTPATIENT
Start: 2020-10-01 | End: 2020-10-01

## 2020-10-01 RX ORDER — MORPHINE SULFATE 2 MG/ML
2 INJECTION, SOLUTION INTRAMUSCULAR; INTRAVENOUS ONCE
Status: DISCONTINUED | OUTPATIENT
Start: 2020-10-01 | End: 2020-10-01 | Stop reason: HOSPADM

## 2020-10-01 RX ORDER — ONDANSETRON 2 MG/ML
4 INJECTION INTRAMUSCULAR; INTRAVENOUS ONCE
Status: COMPLETED | OUTPATIENT
Start: 2020-10-01 | End: 2020-10-01

## 2020-10-01 RX ORDER — HYDROCHLOROTHIAZIDE 25 MG/1
12.5 TABLET ORAL EVERY MORNING
Qty: 30 TABLET | Refills: 5 | Status: SHIPPED | OUTPATIENT
Start: 2020-10-01 | End: 2020-10-29

## 2020-10-01 RX ORDER — AMIODARONE HYDROCHLORIDE 200 MG/1
200 TABLET ORAL DAILY
Qty: 30 TABLET | Refills: 3 | Status: SHIPPED | OUTPATIENT
Start: 2020-10-01 | End: 2020-10-29

## 2020-10-01 RX ORDER — MORPHINE SULFATE 10 MG/ML
2 INJECTION, SOLUTION INTRAMUSCULAR; INTRAVENOUS
Status: DISCONTINUED | OUTPATIENT
Start: 2020-10-01 | End: 2020-10-01 | Stop reason: HOSPADM

## 2020-10-01 RX ADMIN — PANTOPRAZOLE SODIUM 40 MG: 40 INJECTION, POWDER, FOR SOLUTION INTRAVENOUS at 15:36

## 2020-10-01 RX ADMIN — MORPHINE SULFATE 2 MG: 10 INJECTION INTRAVENOUS at 17:04

## 2020-10-01 RX ADMIN — IOPAMIDOL 75 ML: 755 INJECTION, SOLUTION INTRAVENOUS at 16:17

## 2020-10-01 RX ADMIN — MORPHINE SULFATE 2 MG: 2 INJECTION, SOLUTION INTRAMUSCULAR; INTRAVENOUS at 15:30

## 2020-10-01 RX ADMIN — MEROPENEM 1 G: 1 INJECTION, POWDER, FOR SOLUTION INTRAVENOUS at 18:01

## 2020-10-01 RX ADMIN — MORPHINE SULFATE 2 MG: 10 INJECTION INTRAVENOUS at 18:43

## 2020-10-01 RX ADMIN — ONDANSETRON 4 MG: 2 INJECTION INTRAMUSCULAR; INTRAVENOUS at 15:31

## 2020-10-01 ASSESSMENT — ENCOUNTER SYMPTOMS
VOICE CHANGE: 0
NAUSEA: 0
TROUBLE SWALLOWING: 0
COLOR CHANGE: 0
ABDOMINAL DISTENTION: 0
DIARRHEA: 0
CONSTIPATION: 0
BACK PAIN: 0
VOMITING: 0
ABDOMINAL PAIN: 1
SORE THROAT: 1
SHORTNESS OF BREATH: 1
WHEEZING: 0
STRIDOR: 0
COUGH: 0

## 2020-10-01 ASSESSMENT — PAIN DESCRIPTION - PAIN TYPE: TYPE: ACUTE PAIN

## 2020-10-01 ASSESSMENT — PAIN SCALES - GENERAL
PAINLEVEL_OUTOF10: 10
PAINLEVEL_OUTOF10: 9
PAINLEVEL_OUTOF10: 10
PAINLEVEL_OUTOF10: 10

## 2020-10-01 NOTE — ED PROVIDER NOTES
[address] [facility]        Pt Name: Palmira Alan  MRN: [id number]  [place] 1935  Date of evaluation: 10/1/2020  Provider: Amilcar Phelps PA-C  PCP: Neal Ignacio, DO     I have seen and evaluated this patient with my supervising physician Deon [address]       Chief Complaint   Patient presents with    Post-op Problem     Pt to Er with c/o severe pain in throat and epigastrc after MARIN this morning was told to come by surgeon. pt very weak       HISTORY OF PRESENT ILLNESS   (Location, Timing/Onset, Context/Setting, Quality, Duration, Modifying Factors, Severity, Associated Signs and Symptoms)  Note limiting factors. Palmira Alan is a 80 y.o. female with history of aortic valve disease, coronary artery disease, CVA, hypertension who presents to the emergency department with complaints of epigastric abdominal pain with radiation into her chest and sore throat after a MARIN and cardioversion by Dr. Kari Magaña, cardiologist.  Dr. Kari Magaña is at bedside with the patient discussing her symptoms with her. He would like a CAT scan to rule out perforation. No stridor, tripoding or drooling is noted. Patient is able to swallow without difficulty but does have increased pain with this. She does feel short of breath secondary to pain. Denies hemoptysis, productive cough, fever, chills, nausea, vomiting, diarrhea. Patient took baby aspirin this morning. She did not take xarelto today. She took it yesterday and started this new medicine two days ago. Nursing Notes were all reviewed and agreed with or any disagreements were addressed in the HPI. REVIEW OF SYSTEMS    (2-9 systems for level 4, 10 or more for level 5)     Review of Systems   Constitutional: Positive for fatigue. Negative for chills and fever. HENT: Positive for sore throat. Negative for tinnitus, trouble swallowing and voice change.     Eyes: TABLET    Take 1 tablet by mouth daily    COENZYME Q10 (COQ-10 PO)    Take by mouth    FERROUS SULFATE (IRON 325) 325 (65 FE) MG TABLET    Take 1 tablet by mouth 2 times daily    HYDROCHLOROTHIAZIDE (HYDRODIURIL) 25 MG TABLET    Take 0.5 tablets by mouth every morning    PANTOPRAZOLE (PROTONIX) 40 MG TABLET    Take 1 tablet by mouth every morning (before breakfast)    RIVAROXABAN (XARELTO) 15 MG TABS TABLET    Take 1 tablet by mouth daily (with breakfast)    ROSUVASTATIN CALCIUM (CRESTOR PO)    Take by mouth Takes about every other day    THERAPEUTIC MULTIVITAMIN-MINERALS (THERAGRAN-M) TABLET    Take 1 tablet by mouth daily. ALLERGIES     Lipitor and Penicillins    FAMILYHISTORY       Family History   Problem Relation Age of Onset    Heart Disease Father           SOCIAL HISTORY       Social History     Tobacco Use    Smoking status: Never Smoker    Smokeless tobacco: Never Used   Substance Use Topics    Alcohol use: No    Drug use: No       SCREENINGS             PHYSICAL EXAM    (up to 7 for level 4, 8 or more for level 5)     ED Triage Vitals [10/01/20 1507]   BP Temp Temp Source Pulse Resp SpO2 Height Weight   (!) 231/93 -- Oral 98 17 98 % 5' 3\" (1.6 m) 181 lb (82.1 kg)       Physical Exam  Vitals signs and nursing note reviewed. Constitutional:       Appearance: Normal appearance. She is well-developed. She is not toxic-appearing or diaphoretic. HENT:      Head: Normocephalic and atraumatic. Jaw: There is normal jaw occlusion. Salivary Glands: Right salivary gland is not diffusely enlarged or tender. Left salivary gland is not diffusely enlarged or tender. Right Ear: Hearing, tympanic membrane, ear canal and external ear normal.      Left Ear: Hearing, tympanic membrane, ear canal and external ear normal.      Nose: Nose normal.      Right Sinus: No maxillary sinus tenderness or frontal sinus tenderness. Left Sinus: No maxillary sinus tenderness or frontal sinus tenderness. Mouth/Throat:      Lips: Pink. Mouth: Mucous membranes are moist.      Dentition: No dental tenderness. Tongue: No lesions. Tongue does not deviate from midline. Palate: No mass and lesions. Pharynx: Oropharynx is clear. Uvula midline. Tonsils: No tonsillar exudate or tonsillar abscesses. 1+ on the right. 1+ on the left. Eyes:      General: No scleral icterus. Right eye: No discharge. Left eye: No discharge. Extraocular Movements: Extraocular movements intact. Conjunctiva/sclera: Conjunctivae normal.      Pupils: Pupils are equal, round, and reactive to light. Neck:      Musculoskeletal: Normal range of motion. Cardiovascular:      Rate and Rhythm: Normal rate. Heart sounds: Murmur present. Pulmonary:      Effort: Pulmonary effort is normal.      Breath sounds: Normal breath sounds. No stridor. No wheezing, rhonchi or rales. Chest:      Chest wall: Tenderness (mid chest wall) present. No crepitus. Abdominal:      General: Bowel sounds are normal. There is no abdominal bruit. Palpations: Abdomen is soft. There is no pulsatile mass. Tenderness: There is abdominal tenderness in the epigastric area. There is no right CVA tenderness, left CVA tenderness, guarding or rebound. Negative signs include Wayne's sign, Rovsing's sign, McBurney's sign and obturator sign. Musculoskeletal: Normal range of motion. Skin:     General: Skin is warm and dry. Capillary Refill: Capillary refill takes less than 2 seconds. Coloration: Skin is not jaundiced or pale. Findings: No bruising, erythema, lesion or rash. Neurological:      General: No focal deficit present. Mental Status: She is alert and oriented to person, place, and time. Psychiatric:         Attention and Perception: Attention and perception normal.         Mood and Affect: Mood is anxious.          Speech: Speech normal.         Behavior: Behavior normal. Behavior is cooperative. Thought Content: Thought content normal.         Cognition and Memory: Cognition and memory normal.         Judgment: Judgment normal.         DIAGNOSTIC RESULTS   LABS:    Labs Reviewed   CBC WITH AUTO DIFFERENTIAL - Abnormal; Notable for the following components:       Result Value    Neutrophils Absolute 9.3 (*)     Lymphocytes Absolute 0.5 (*)     All other components within normal limits    Narrative:     Performed at:  [facility]  [address], [place]   Phone [phone]   COMPREHENSIVE METABOLIC PANEL W/ REFLEX TO MG FOR LOW K - Abnormal; Notable for the following components:    Glucose 119 (*)     All other components within normal limits    Narrative:     Performed at:  [facility] [address] [employer], [place]   Phone [phone]   TROPONIN    Narrative:     Performed at:  [facility]  [address], [place]   Phone [phone]   PROTIME-INR    Narrative:     Performed at:  [facility]  [address], [place]   Phone [phone]   LIPASE       All other labs were within normal range or not returned as of this dictation. EKG: All EKG's are interpreted by the Emergency Department Physician in the absence of a cardiologist.  Please see their note for interpretation of EKG. RADIOLOGY:   Non-plain film images such as CT, Ultrasound and MRI are read by the radiologist. Plain radiographic images are visualized and preliminarily interpreted by the ED Provider with the below findings:        Interpretation per the Radiologist below, if available at the time of this note:    CT CHEST PULMONARY EMBOLISM W CONTRAST   Final Result   1. Findings concerning for a contained perforation in the posterior   mediastinum adjacent a small to moderate hiatal hernia. 2. Multivessel coronary artery disease. Procedures    CRITICAL CARE TIME   Critical Care  There was a high probability of life-threatening clinical deterioration in the patient's condition requiring my urgent intervention. Total critical care time with the patient was 65 minutes excluding separately reportable procedures. Critical care required due to patients esophageal perforation prompting medical management, consultations and admission. CONSULTS:  Dr Kassidy Cheung, radiologist, called at [phone] to confirm the esophageal perforation. Dr Kathia Zeng informed of the CT findings at [phone]. Dr. Liliana Gomez, cardiothoracic surgeon, consulted at [phone]. We discussed the CT findings with him. My attending consulted with pharmacist about Mercedes Mak. Dr Lisa Jacobson will admit ([phone])    Dr Ysabel Bright, CT surgery, was in the hospital and discussed the case with Dr Liliana Gomez and reviewed the CT. He and Dr Liliana Gomez feel that this patient should be transferred to [facility] for further management because Dr Daisy Moreno, the [facility] surgeon who specializes in this type of condition, is out of town for the next week. [phone]     I informed Dr Lisa Jacobson at [phone] to disregard admission as we plan to transfer the patient. At 1830, I spoke with Dr Nigel Palma, [address] [place] [address] surgeon with [facility] hospital and we discussed the case in length. He agrees with plan for transfer to their facility. He would like ED to ED transfer.     I spoke with Dr Barron Olmstead, ED physician at [facility], who  Has accepted the patient for transport to their facility. ([phone])      [facility] and DIFFERENTIAL DIAGNOSIS/MDM:   Vitals:    Vitals:    10/01/20 1540 10/01/20 1545 10/01/20 1700 10/01/20 1730   BP:  (!) 226/97 (!) 196/97 (!) 203/97   Pulse:  100 109 111   Resp:  19 19 19   Temp: 97 °F (36.1 °C)      TempSrc: Oral      SpO2:  95%     Weight:       Height:           Patient was given the following medications:  Medications   sterile water injection (has no administration in time range)   morphine (PF) injection 2 mg (has no administration in time range)   morphine injection 2 mg (2 mg Intravenous Given 10/1/20 1704)   morphine (PF) injection 2 mg (2 mg Intravenous Given 10/1/20 1530)   ondansetron (ZOFRAN) injection 4 mg (4 mg Intravenous Given 10/1/20 1531)   pantoprazole (PROTONIX) injection 40 mg (40 mg Intravenous Given 10/1/20 1536)   iopamidol (ISOVUE-370) 76 % injection 75 mL (75 mLs Intravenous Given 10/1/20 1617)   meropenem (MERREM) 1 g in sodium chloride 0.9 % 100 mL IVPB (mini-bag) (1 g Intravenous New Bag 10/1/20 1801)           This patient presents complaining of sore throat, epigastric abdominal pain with radiation into her chest after a transesophageal echocardiogram and cardioversion by cardiologist today. CT chest abdomen and pelvis confirms esophageal perforation. Blood work is stable. EKG appears unremarkable. Blood pressure is elevated at this time, however patient is in pain. This may be contributing to her elevated blood pressure. It is coming down with pain control. I did inform Dr Catrina Dunn of CT results. We paged the cardiothoracic surgeon and discussed the CT results. Plan is to send patient to [address] [place] [address] for further management. Patient and family member, Cy Mendoza, understand and agree with plan for transfer to .        My suspicion is low for influenza, pertussis, pneumonia, pneumothorax, ARDS, complication from congenital heart defect, aortic coarctation, otitis, dental abscess, Marty angina, trench mouth, intussusception,ACS, PE, myocarditis, pericarditis, endocarditis, acute pulmonary edema, pleural effusion, pericardial effusion, cardiac tamponade,  CHF exacerbation, thoracic aortic dissection,  other life-threatening arrhythmia, hypertensive  emergency, hemothorax, pulmonary contusion, subcutaneous emphysema, flail chest, pneumo mediastinum, rib fracture , carotid dissection, sinus abscess, acute fracture, acute CVA, ICH, SAH, TIA, meningitis, encephalitis, pseudotumor cerebri, temporal arteritis, sentinel bleed from ruptured aneurysm, hypertensive urgency or emergency, subdural hematoma, epidural hematoma, acute strep pharyngitis, peritonsillar or tonsillar abscess, retropharyngeal abscess, bacterial tracheitis, epiglottitis, meningitis, encephalitis, foreign body, angioedema, anaphylaxis, mononucleosis, mumps, lymphoma, leukemia, asthma exacerbation, osteomyelitis, mastoiditis, sepsis, DKA,  obstruction, abscess, mesenteric ischemia, AAA, dissection, cholecystitis, cholangitis, pancreatitis, appendicitis, C. diff colitis, diverticulitis, volvulus, incarcerated hernia, necrotizing fasciitis, TOA, ovarian torsion, PID, ectopic pregnancy, chidi eduarda Jem syndrome,  incarcerated hernia, Vidhi gangrene, pyelonephritis, perinephric abscess, kidney stone, urosepsis, fistula or other concerning pathology. FINAL IMPRESSION      1. Esophageal perforation    2. Acute chest pain    3. Abdominal pain, epigastric    4. Acute sore throat    5. Accelerated hypertension          DISPOSITION/PLAN   DISPOSITION  Decision to transfer      PATIENT REFERREDTO:  No follow-up provider specified.     DISCHARGE MEDICATIONS:  New Prescriptions    No medications on file       DISCONTINUED MEDICATIONS:  Discontinued Medications    No medications on file              (Please note that portions of this note were completed with a voice recognition program.  Efforts were made to edit the dictations but occasionally words are mis-transcribed.)    Marlen Yates PA-C (electronically signed)            Marlen Yates PA-C  10/01/20 [id number]

## 2020-10-01 NOTE — TELEPHONE ENCOUNTER
Spoke to patient she is in a lot of pain in her throat, she feels the left side is swelling shut and is having trouble swallowing. She is having pain in the Lt side of her chest around her stomach feels like its cramping. She stated she is 1 hour away and just got home. Patient asked to see what LES wanted her to do. Should she go to the ED?   Please advise

## 2020-10-01 NOTE — CONSULTS
Department of Cardiovascular & Thoracic Surgery  Consultation          PCP: Love Miranda DO    Referring Physician: Funmi Silvestre MD    DIAGNOSIS:  esophageal perforation    CHIEF COMPLAINT:  Chest and throat pain    History Obtained From:  patient, electronic medical record    HISTORY OF PRESENT ILLNESS:      The patient is a 80 y.o. female with significant past medical history of atrial fibrillation, tissue AVR ( per Dr. Partida Rang 2015 ), CVA, hypertension, and hyperlipidemia. Patient underwent a MARIN/ cardioversion earlier this AM.  Last dose of xarelto yesterday morning. Patient return to the ER this afternoon with chest/ throat pain as well as upper abdominal pain and CT scan showed possible esophageal perforation. We have been consulted to evaluate for the above CT scan findings. On my evaluation the patient complains of moderately severe epigastric pain that radiates to her mediastinum. She denies any nausea, vomiting, retching or any other constitutional symptoms such as fevers, chills. However she mentions that she has not feeling well pretty much immediately after the completion of her MARIN earlier in the morning. She is hemodynamically stable on 2 L nasal cannula. Past Medical History:        Diagnosis Date    Aortic valve disease     CAD (coronary artery disease)     CVA (cerebral infarction) 10/2015    Hypertension        Past Surgical History:        Procedure Laterality Date    AORTIC VALVE REPLACEMENT  12/4/2015    AVR porcine by   [id number] CATHETERIZATION  11/19/2015    Dr. Kathryn Arriaga - no flow restrictive stenoses in coronary arteries    CATARACT REMOVAL WITH IMPLANT Left 10/9/2015    COLONOSCOPY N/A 5/8/2020    COLONOSCOPY POLYPECTOMY SNARE/COLD BIOPSY performed by Crosby Ganser, MD at [facility]  12/22/2004    TOTAL KNEE ARTHROPLASTY Right 7/15/2013    UPPER GASTROINTESTINAL ENDOSCOPY N/A 5/8/2020    EGD BIOPSY performed by Dodie Mc MD at [facility] medications:    Prior to Admission medications    Medication Sig Start Date End Date Taking? Authorizing Provider   amiodarone (CORDARONE) 200 MG tablet Take 1 tablet by mouth daily 10/1/20  Yes Christoph Carrasquillo MD   hydroCHLOROthiazide (HYDRODIURIL) 25 MG tablet Take 0.5 tablets by mouth every morning 10/1/20  Yes Christoph Carrasquillo MD   Rosuvastatin Calcium (CRESTOR PO) Take by mouth Takes about every other day   Yes Historical Provider, MD   rivaroxaban (XARELTO) 15 MG TABS tablet Take 1 tablet by mouth daily (with breakfast) 9/22/20  Yes Christoph Carrasquillo MD   ferrous sulfate (IRON 325) 325 (65 Fe) MG tablet Take 1 tablet by mouth 2 times daily  Patient taking differently: Take 325 mg by mouth daily (with breakfast)  5/8/20  Yes Christoph Carrasquillo MD   pantoprazole (PROTONIX) 40 MG tablet Take 1 tablet by mouth every morning (before breakfast)  Patient taking differently: Take 40 mg by mouth Takes just as needed. 5/8/20  Yes Christoph Carrasquillo MD   amLODIPine (NORVASC) 5 MG tablet Take 1 tablet by mouth daily 10/8/19  Yes Christoph Carrasquillo MD   aspirin 81 MG tablet Take 1 tablet by mouth daily 8/8/19  Yes Amandeep Holliday MD   Coenzyme Q10 (COQ-10 PO) Take by mouth   Yes Historical Provider, MD   therapeutic multivitamin-minerals (THERAGRAN-M) tablet Take 1 tablet by mouth daily.    Yes Historical Provider, MD       Medications:   Current Facility-Administered Medications   Medication Dose Route Frequency Provider Last Rate Last Dose    sterile water injection             morphine (PF) injection 2 mg  2 mg Intravenous Once Catrachita Montoya,         morphine injection 2 mg  2 mg Intravenous Q1H PRN Christoph Carrasquillo MD   2 mg at 10/01/20 1704    meropenem (MERREM) 1 g in sodium chloride 0.9 % 100 mL IVPB (mini-bag)  1 g Intravenous Once Catrachita Montoya,          Current Outpatient Medications   Medication Sig Dispense Refill    amiodarone (CORDARONE) 200 MG tablet Take 1 tablet by mouth daily 30 tablet 3    hydroCHLOROthiazide (HYDRODIURIL) 25 MG tablet Take 0.5 tablets by mouth every morning 30 tablet 5    Rosuvastatin Calcium (CRESTOR PO) Take by mouth Takes about every other day      rivaroxaban (XARELTO) 15 MG TABS tablet Take 1 tablet by mouth daily (with breakfast) 90 tablet 3    ferrous sulfate (IRON 325) 325 (65 Fe) MG tablet Take 1 tablet by mouth 2 times daily (Patient taking differently: Take 325 mg by mouth daily (with breakfast) ) 60 tablet 5    pantoprazole (PROTONIX) 40 MG tablet Take 1 tablet by mouth every morning (before breakfast) (Patient taking differently: Take 40 mg by mouth Takes just as needed.) 30 tablet 5    amLODIPine (NORVASC) 5 MG tablet Take 1 tablet by mouth daily 90 tablet 3    aspirin 81 MG tablet Take 1 tablet by mouth daily 30 tablet 3    Coenzyme Q10 (COQ-10 PO) Take by mouth      therapeutic multivitamin-minerals (THERAGRAN-M) tablet Take 1 tablet by mouth daily.          Allergies:  Lipitor and Penicillins    Social History:    TOBACCO:  Reports no history of tobacco use   ETOH: reports no history of alcohol abuse   DRUGS: reports no history of drug abuse   LIFESTYLE: active  MARITAL STATUS:    OCCUPATION:  Retired     Family History:        Problem Relation Age of Onset    Heart Disease Father        REVIEW OF SYSTEMS:    CONSTITUTIONAL: alert and oriented x 3, pleasant 80year old female   DERMATOLOGICAL: negative  NEUROLOGICAL:  negative  EYES:  negative  HEENT:  negative  RESPIRATORY:  negative  CARDIOVASCULAR:  negative  GASTROINTESTINAL:  negative  GENITO-URINARY: no dysuria, trouble voiding, or hematuria  ENDOCRINE: negative  MUSCULOSKELETAL:  negative  HEMATOLOGICAL AND LYMPHATIC: negative  IMMUNOLOGICAL: negative  PSYCHOLOGICAL: negative    PHYSICAL EXAM:    VITALS:  BP (!) 196/97   Pulse 109   Temp 97 °F (36.1 °C) (Oral)   Resp 19   Ht 5' 3\" (1.6 m)   Wt 181 lb (82.1 kg)   SpO2 95%   BMI 32.06 kg/m²     Eyes:  lids and lashes normal, pupils equal and round, extra ocular muscles intact, sclera clear, conjunctiva normal    Head/ENT:  Normocephalic, atraumatic, no residual dentition, normal gums, & palate, oropharynx without erythema or exudates    Neck:  supple, symmetrical, trachea midline, no lymphadenopathy, no jugular venous distension, no carotid bruits and MASSES:  no masses    Lungs:  no increased work of breathing, good air exchange, no retractions and clear to auscultation, no palpable / percussible abnormalities    Cardiovascular:  regular rate and rhythm, S1, S2 normal, no murmur, click, rub or gallop and normal apical impulse   Well-healed midline sternotomy incision    Abdomen:  Soft, normal bowel sounds, tender to palpation in the midepigastrium, no rebound tenderness, no hepatosplenomegaly, aorta likely normal and bruits absent    Musculoskeletal:  Back is straight and non-tender,  No CVAT, full ROM of upper and lower extremities.     Extremities:   No clubbing, or cyanosis, or edema     Skin: warm and normal turgor, no ulcers, infections, or rashes, no rashes, no ecchymoses, no petechiae, no nodules, no jaundice, no purpura, no wounds, no acanthosis nigricans, no striae    Neurological: awake, alert and oriented x 3, motor 5/5 bilateral upper and lower extremities, sensation grossly intact    Psychiatric: Mood and affect appear appropriate    DATA:    EKG:  10/1/2020 sinus rhythm   CBC:   Lab Results   Component Value Date    WBC 10.3 10/01/2020    RBC 4.65 10/01/2020    HGB 14.6 10/01/2020    HCT 44.5 10/01/2020    MCV 95.7 10/01/2020    MCH 31.5 10/01/2020    MCHC 32.9 10/01/2020    RDW 15.0 10/01/2020     10/01/2020    MPV 10.2 10/01/2020     BMP:    Lab Results   Component Value Date     10/01/2020    K 4.2 10/01/2020     10/01/2020    CO2 24 10/01/2020    BUN 19 10/01/2020    LABALBU 4.3 10/01/2020    CREATININE 0.6 10/01/2020    CALCIUM 9.4 10/01/2020    GFRAA >60 10/01/2020    LABGLOM >60 10/01/2020    GLUCOSE 119 10/01/2020       CXRAY:  10/1/2020  No definite pneumoperitoneum within the visualized upper abdomen. 2. Rounded lucency at the right cardiophrenic angle could represent a hiatal   hernia. 3. Faint hazy opacity at the bilateral lung bases, likely atelectasis. EGD 5/8/2020 (Dr. Aren Peñaloza)  Large hiatal hernia, few gastric erosions, bx for H. Pylori     Colonoscopy 5/8/2020 (Dr. Aren Peñaloza)  Small rectal polyp removed,sigmoid diverticulosis, small hemorrhoids     LHC 5/7/2020  Cath with 2 plus AI,no significant AV gradient. LVEDP 25 c/w high output CHF  EF 60  40% left main  40% in stent LAD,40% ostial CFX  30% RCA     Imp- high output CHF due to symptomatic anemia     Will give 1 unit of PRBC,IV iron  GI evaluation      ECHO 5/31/19  Normal left ventricle size, wall thickness and systolic function with an   estimated ejection fraction of 60%. No regional wall motion abnormalities   are seen.   E/e\"= 12. Diastolic filling parameters suggests grade I diastolic   dysfunction.   Mild to moderate mitral regurgitation   The left atrium is mildly dilated.   A bioprosthetic aortic valve (21mm Mosaic Ultra) appears well seated with a   maximum gradient of 27 mmHg and a mean gradient of 17 mmHg. Aortic valve   area of 1.4cm2. Mild aortic regurgitation.   Mild tricuspid regurgitation. PASP 24mmHg.   IVC size is normal (<2.1cm) and collapses > 50% with respiration consistent   with normal RA pressure (3mmHg). ASSESSMENT AND PLAN:  59-year-old female who status post MARIN with cardioversion earlier today for A. fib on anticoagulation with Xarelto that was held since yesterday who presents to the ED with epigastric abdominal pain and CT scan findings concerning for a distal esophageal perforation in close vicinity with good size hiatal hernia.   She is afebrile with vital signs stable and without leukocytosis however she does have concerning epigastric pain moderate to severe with epigastric tenderness to palpation on exam.  She needs further work-up with esophagram and possible intervention if that confirms the presence of esophageal leak. I discussed this case with my partner Dr. Elma Moreno who is actually on call tonight and who asked me to see the patient on his behalf since I happened to be at [facility].  Patient is 80years old with multiple medical comorbidities. Within our group the only surgeon  that performs esophageal surgery/stenting procedures is Dr. Vineet Tobar and unfortunately he is out of town this week. Patient also has additional upper GI pathology which is a good sized hiatal hernia. We both agreed that the best course of action is to recommend transfer of the patient to a quaternary referral center that can provide immediate treatment for esophageal perforation(both endoscopic and surgical as needed),  preferably the [facility]. Recommend strict n.p.o., keep holding anticoagulation, broad-spectrum antibiotics and immediate transfer. All the above were discussed with the patient and her wife Meenakshi Yadav who was at the bedside. They understand that this could be a serious condition and that she might require an intervention that could include as tent vs major surgery. Her daughter Meenakshi Yadav requested that I notify Dr. Kari Magaña who was the cardiologist who performed the MARIN cardioversion earlier today, but it appears from the documentation that he is very aware of the situation. While he was still present in the emergency department I was notified that the patient has been already accepted for transfer to [facility] emergency department. All of their questions were answered. Thank you very much for allowing us to participate in the care of this patient.

## 2020-10-01 NOTE — PRE SEDATION
Brief Pre-Op Note/Sedation Assessment      Shelly Eid  1935  Cath/NONE      [id number]  10:10 AM    Planned Procedure: MARIN/cardioversion    Post Procedure Plan: Return to same level of care    Consent: I have discussed with the patient and/or the patient representative the indication, alternatives, and the possible risks and/or complications of the planned procedure and the anesthesia methods. The patient and/or patient representative appear to understand and agree to proceed. Chief Complaint: persistent a fib      Indications for Cath Procedure:  Cardiac Arrythmia  Anginal Classification within 2 weeks:  CCS I - Angina only during strenuous or prolonged physical activity  NYHA Heart Failure Class within 2 weeks: Class II - Symptoms of HF on ordinary exertion, Newly Diagnosed? No, Heart Failure Type: Diastolic  Is Cath Lab Visit Valve-related?: Mitral Regurgitation; Regurgitation Severity: Moderate (2+)  Surgical Risk: Low  Functional Type: >= 4 METS with symptoms    Anti- Anginal Meds within 2 weeks:   Yes: Ca Channel Blockers and Aspirin    Stress or Imaging Studies Performed (within 6 months):  None     Vital Signs:  BP (!) 160/95   Pulse 114   Resp 18   Ht 5' 3\" (1.6 m)   Wt 187 lb (84.8 kg)   BMI 33.13 kg/m²     Allergies: Allergies   Allergen Reactions    Lipitor Hives    Penicillins        Past Medical History:  Past Medical History:   Diagnosis Date    Aortic valve disease     CAD (coronary artery disease)     CVA (cerebral infarction) 10/2015    Hypertension          Surgical History:  Past Surgical History:   Procedure Laterality Date    AORTIC VALVE REPLACEMENT  12/4/2015    AVR porcine by DR. [facility] CATHETERIZATION  11/19/2015    Dr. Mickle Severance - no flow restrictive stenoses in coronary arteries    CATARACT REMOVAL WITH IMPLANT Left 10/9/2015    COLONOSCOPY N/A 5/8/2020    COLONOSCOPY POLYPECTOMY SNARE/COLD BIOPSY performed by Víctor Venegas MD at [address] [facility] ENDOSCOPY    CORONARY ANGIOPLASTY WITH STENT PLACEMENT  12/22/2004    TOTAL KNEE ARTHROPLASTY Right 7/15/2013    UPPER GASTROINTESTINAL ENDOSCOPY N/A 5/8/2020    EGD BIOPSY performed by Lady Janes MD at [address] [place] [address]         Medications:  No current facility-administered medications for this encounter. Pre-Sedation:    Pre-Sedation Documentation and Exam:  I have personally completed a history, physical exam & review of systems for this patient (see notes). Prior History of Anesthesia Complications:   none    Modified Mallampati:  I (soft palate, uvula, fauces, tonsillar pillars visible)    ASA Classification:  Class 2 - A normal healthy patient with mild systemic disease      Shey Scale: Activity:  2 - Able to move 4 extremities voluntarily on command  Respiration:  2 - Able to breathe deeply and cough freely  Circulation:  2 - BP+/- 20mmHg of normal  Consciousness:  2 - Fully awake  Oxygen Saturation (color):  2 - Able to maintain oxygen saturation >92% on room air    Sedation/Anesthesia Plan:  Guard the patient's safety and welfare. Minimize physical discomfort and pain. Minimize negative psychological responses to treatment by providing sedation and analgesia and maximize the potential amnesia. Patient to meet pre-procedure discharge plan.     Medication Planned:  midazolam intravenously, fentanyl intravenously and brevital    Patient is an appropriate candidate for plan of sedation: yes      Electronically signed by Kady Dorado MD on 10/1/2020 at 10:10 AM

## 2020-10-01 NOTE — PROGRESS NOTES
Patient seen in ER with chest pain with evaluation pending. Agree with management by ER team. MARIN was advanced without difficulty with good imaging obtained  Patient gives h/o hiatal hernia. Certainly with degree of pain concern for esophageal perforation. If that has occurred would need urgent CT surgery consult. Case discussed with Dr Reg Pinto and [facility] with daughter and patient. Urgent CT surgery consult with Dr Susan Merino dose of xarelto yesterday morning. She has nonflow significant CAD by recent cath,normal function of a porcine aortic valve and moderate MR.   Also recurrent TIA with likely cardioembolic source

## 2020-10-01 NOTE — TELEPHONE ENCOUNTER
No answer on the pt phone. Called the pt's , who handed the phone to [facility]. Her phone number is [phone]. Stated she had just spoken to Dr. Hal Silva, on the pt's phone.

## 2020-10-01 NOTE — ED NOTES
Pt comes in from home due to post op problem. Pt states she has pain in throat and epigastric after MARIN this morning and was told to come in by surgeon. No stridor, drooling noted. Pt is able to swallow but has increased pain with it. Swelling  Noted. Hypertension and tachycardia noted on monitor. Denies any other symptoms. Daughter at bedside. Call light within reach. Will continue to monitor.       Chacha Smith RN  10/01/20 [id number]

## 2020-10-01 NOTE — TELEPHONE ENCOUNTER
Daughter calling to say she just got patient home from the hospital and she is in a lot of pain . States her stomach is cramping and really hurting and her throat feels like it is closing . Had CV/MARIN with LES today.  Call given to [address] [place] [address]

## 2020-10-01 NOTE — ED PROVIDER NOTES
eMERGENCY dEPARTMENT eNCOUnter      PtName: Shane Jansen  MRN: [id number]  [place] 1935  Date of evaluation: 10/1/2020  Provider: Vikas Corley, [address] [place] [address]       Chief Complaint   Patient presents with    Post-op Problem     Pt to Er with c/o severe pain in throat and epigastrc after MARIN this morning was told to come by surgeon. pt very weak         HISTORY OF PRESENT ILLNESS   (Location/Symptom, Timing/Onset,Context/Setting, Quality, Duration, Modifying Factors, Severity) Note limiting factors. HPI    Shane Jansen is a 80 y.o. female who presents to the emergency department with chief complaint of chest pain. Started after she had a MARIN earlier today with Dr. Ozzie Norris. Started at approximately 1115 after she drank some cold ice tea after her procedure. Pain is described as sharp, central without radiation 10/10. No alleviating or aggravating factors. No shortness of breath. Does not radiate to the back. Nursing Notes were reviewed. REVIEW OF SYSTEMS    (2+ forlevel 4; 10+ for level 5)     Review of Systems  See hpi for further details. Complete 10 point review of all systems performed and is otherwise negative unless noted above. PAST MEDICAL HISTORY     Past Medical History:   Diagnosis Date    Aortic valve disease     CAD (coronary artery disease)     CVA (cerebral infarction) 10/2015    Hypertension        SURGICAL HISTORY       Past Surgical History:   Procedure Laterality Date    AORTIC VALVE REPLACEMENT  12/4/2015    AVR porcine by DR. Lao [facility] CATHETERIZATION  11/19/2015    Dr. Jose Brock - no flow restrictive stenoses in coronary arteries    CATARACT REMOVAL WITH IMPLANT Left 10/9/2015    COLONOSCOPY N/A 5/8/2020    COLONOSCOPY POLYPECTOMY SNARE/COLD BIOPSY performed by Lady Janes MD at [facility]  12/22/2004    TOTAL KNEE ARTHROPLASTY Right 7/15/2013    UPPER GASTROINTESTINAL ENDOSCOPY N/A 5/8/2020    EGD BIOPSY performed by Luis Magaña MD at [facility]       Discharge Medication List as of 10/1/2020  7:32 PM      CONTINUE these medications which have NOT CHANGED    Details   amiodarone (CORDARONE) 200 MG tablet Take 1 tablet by mouth daily, Disp-30 tablet,R-3Normal      hydroCHLOROthiazide (HYDRODIURIL) 25 MG tablet Take 0.5 tablets by mouth every morning, Disp-30 tablet,R-5Normal      Rosuvastatin Calcium (CRESTOR PO) Take by mouth Takes about every other dayHistorical Med      rivaroxaban (XARELTO) 15 MG TABS tablet Take 1 tablet by mouth daily (with breakfast), Disp-90 tablet,R-3Normal      ferrous sulfate (IRON 325) 325 (65 Fe) MG tablet Take 1 tablet by mouth 2 times daily, Disp-60 tablet, R-5Normal      pantoprazole (PROTONIX) 40 MG tablet Take 1 tablet by mouth every morning (before breakfast), Disp-30 tablet, R-5Normal      amLODIPine (NORVASC) 5 MG tablet Take 1 tablet by mouth daily, Disp-90 tablet, R-3Normal      aspirin 81 MG tablet Take 1 tablet by mouth daily, Disp-30 tablet, R-3Print      Coenzyme Q10 (COQ-10 PO) Take by mouthHistorical Med      therapeutic multivitamin-minerals (THERAGRAN-M) tablet Take 1 tablet by mouth daily.              ALLERGIES     Lipitor and Penicillins    FAMILY HISTORY       Family History   Problem Relation Age of Onset    Heart Disease Father           SOCIAL HISTORY       Social History     Socioeconomic History    Marital status:      Spouse name: None    Number of children: None    Years of education: None    Highest education level: None   Occupational History    None   Social Needs    Financial resource strain: None    Food insecurity     Worry: None     Inability: None    Transportation needs     Medical: None     Non-medical: None   Tobacco Use    Smoking status: Never Smoker    Smokeless tobacco: Never Used   Substance and Sexual Activity    Alcohol use: No    Drug use: No    Sexual activity: None   Lifestyle    Physical activity     Days per week: None     Minutes per session: None    Stress: None   Relationships    Social connections     Talks on phone: None     Gets together: None     Attends [employer] service: None     Active member of club or organization: None     Attends meetings of clubs or organizations: None     Relationship status: None    Intimate partner violence     Fear of current or ex partner: None     Emotionally abused: None     Physically abused: None     Forced sexual activity: None   Other Topics Concern    None   Social History Narrative    None       SCREENINGS           PHYSICAL EXAM    (5+ for level 4, 8+ for level 5)     ED Triage Vitals   BP Temp Temp src Pulse Resp SpO2 Height Weight   -- -- -- -- -- -- -- --       Physical Exam  Vitals signs and nursing note reviewed. Constitutional:       Appearance: Normal appearance. She is not toxic-appearing or diaphoretic. Comments: Uncomfortable appearing. HENT:      Head: Normocephalic and atraumatic. Right Ear: External ear normal.      Left Ear: External ear normal.      Nose: Nose normal.      Mouth/Throat:      Mouth: Mucous membranes are moist.      Pharynx: Oropharynx is clear. Eyes:      General: No scleral icterus. Right eye: No discharge. Left eye: No discharge. Extraocular Movements: Extraocular movements intact. Conjunctiva/sclera: Conjunctivae normal.      Pupils: Pupils are equal, round, and reactive to light. Neck:      Musculoskeletal: Normal range of motion and neck supple. Cardiovascular:      Pulses: Normal pulses. Pulmonary:      Effort: Pulmonary effort is normal. No respiratory distress. Breath sounds: Normal breath sounds. No stridor. Abdominal:      General: Abdomen is flat. There is no distension. Tenderness: There is no abdominal tenderness. Musculoskeletal: Normal range of motion.          General: No swelling, tenderness, deformity or signs of injury. Right lower leg: No edema. Left lower leg: No edema. Skin:     General: Skin is warm and dry. Capillary Refill: Capillary refill takes less than 2 seconds. Coloration: Skin is not jaundiced or pale. Findings: No bruising, erythema, lesion or rash. Neurological:      General: No focal deficit present. Mental Status: She is alert and oriented to person, place, and time. Cranial Nerves: No cranial nerve deficit. Sensory: No sensory deficit. Motor: No weakness. Psychiatric:         Mood and Affect: Mood normal.         Behavior: Behavior normal.         Thought Content: Thought content normal.         Judgment: Judgment normal.         DIAGNOSTIC RESULTS     EKG (Per Emergency Physician):   EKG interpretation by ED physician: Left axis deviation, sinus rhythm at 93 bpm nonspecific ST slurring laterally. Similar to previous EKG from earlier today on October 1, 2020 also similar to previous EKG from 5/7/2020    RADIOLOGY (Per Emergency Physician): Interpretation per the Radiologist below, if available at the time of this note:  Ct Abdomen Pelvis W Iv Contrast Additional Contrast? None    Result Date: 10/1/2020  EXAMINATION: CT OF THE ABDOMEN AND PELVIS WITH CONTRAST; CTA OF THE CHEST 10/1/2020 4:17 pm TECHNIQUE: CT of the abdomen and pelvis was performed with the administration of intravenous contrast. Multiplanar reformatted images are provided for review. Dose modulation, iterative reconstruction, and/or weight based adjustment of the mA/kV was utilized to reduce the radiation dose to as low as reasonably achievable.; CTA of the chest was performed after the administration of intravenous contrast.  Multiplanar reformatted images are provided for review. MIP images are provided for review.  Dose modulation, iterative reconstruction, and/or weight based adjustment of the mA/kV was utilized to reduce the radiation dose to as low as reasonably achievable. COMPARISON: None HISTORY: ORDERING SYSTEM PROVIDED HISTORY: lower cp, sp JULIO earlier today TECHNOLOGIST PROVIDED HISTORY: Additional Contrast?->None Reason for exam:->lower cp, sp JULIO earlier today Reason for Exam: lower cp, sp JULIO earlier today. Acuity: Acute Type of Exam: Initial; ORDERING SYSTEM PROVIDED HISTORY: cp after julio today TECHNOLOGIST PROVIDED HISTORY: Reason for exam:->cp after julio today Reason for Exam: CP after julio today. Acuity: Acute Type of Exam: Initial ORDERING SYSTEM PROVIDED HISTORY: lower cp, sp JULIO earlier today TECHNOLOGIST PROVIDED HISTORY: Additional Contrast?-> none Reason for exam:-> lower cp, sp JULIO earlier today Reason for Exam: lower cp, sp JULIO earlier today. Acuity: Acute Type of Exam: Initial FINDINGS: Chest: Mediastinum: Small to moderate hiatal hernia. Medial to the hiatal hernia in the posterior mediastinum posterior to the left atrium, there is a debris and gas containing collection measuring 4.0 x 2.1 cm concerning for a contained perforation. No significant mediastinal or hilar lymphadenopathy. Multivessel coronary artery disease. The thoracic aorta is normal in course and caliber with diffuse atherosclerotic disease. There is adequate opacification of the pulmonary arteries. No evidence of acute pulmonary embolism. The main pulmonary artery is nondilated. Lungs/pleura: Bibasilar atelectasis. No pleural effusion or pneumothorax. Noncalcified pulmonary nodule in the left upper lobe measures up to 9 mm. Central airways are grossly patent. Soft Tissues/Bones: No acute soft tissue or osseous abnormality. Abdomen/Pelvis: Organs: The liver, gallbladder, spleen, pancreas, adrenal glands, and kidneys are normal.  No hydronephrosis. GI/Bowel: The small and large bowel are nondilated. Sigmoid diverticulosis without evidence of acute diverticulitis. The appendix is normal. Pelvis: Status post hysterectomy.   The urinary bladder is normal. Peritoneum/Retroperitoneum: abnormality. 1. No definite pneumoperitoneum within the visualized upper abdomen. 2. Rounded lucency at the right cardiophrenic angle could represent a hiatal hernia. 3. Faint hazy opacity at the bilateral lung bases, likely atelectasis. Ct Chest Pulmonary Embolism W Contrast    Result Date: 10/1/2020  EXAMINATION: CT OF THE ABDOMEN AND PELVIS WITH CONTRAST; CTA OF THE CHEST 10/1/2020 4:17 pm TECHNIQUE: CT of the abdomen and pelvis was performed with the administration of intravenous contrast. Multiplanar reformatted images are provided for review. Dose modulation, iterative reconstruction, and/or weight based adjustment of the mA/kV was utilized to reduce the radiation dose to as low as reasonably achievable.; CTA of the chest was performed after the administration of intravenous contrast.  Multiplanar reformatted images are provided for review. MIP images are provided for review. Dose modulation, iterative reconstruction, and/or weight based adjustment of the mA/kV was utilized to reduce the radiation dose to as low as reasonably achievable. COMPARISON: None HISTORY: ORDERING SYSTEM PROVIDED HISTORY: lower cp, sp JULIO earlier today TECHNOLOGIST PROVIDED HISTORY: Additional Contrast?->None Reason for exam:->lower cp, sp JULIO earlier today Reason for Exam: lower cp, sp JULIO earlier today. Acuity: Acute Type of Exam: Initial; ORDERING SYSTEM PROVIDED HISTORY: cp after julio today TECHNOLOGIST PROVIDED HISTORY: Reason for exam:->cp after julio today Reason for Exam: CP after julio today. Acuity: Acute Type of Exam: Initial ORDERING SYSTEM PROVIDED HISTORY: lower cp, sp JULIO earlier today TECHNOLOGIST PROVIDED HISTORY: Additional Contrast?-> none Reason for exam:-> lower cp, sp JULIO earlier today Reason for Exam: lower cp, sp JULIO earlier today. Acuity: Acute Type of Exam: Initial FINDINGS: Chest: Mediastinum: Small to moderate hiatal hernia.   Medial to the hiatal hernia in the posterior mediastinum posterior to the left cancer. Follow up in patients with significant comorbidities as clinically warranted. For lung cancer screening, adhere to Lung-RADS guidelines. Reference: Radiology. 2017; 284(1):228-43. LABS:  Labs Reviewed   CBC WITH AUTO DIFFERENTIAL - Abnormal; Notable for the following components:       Result Value    Neutrophils Absolute 9.3 (*)     Lymphocytes Absolute 0.5 (*)     All other components within normal limits    Narrative:     Performed at:  [facility]  [employer]   Phone [phone]   COMPREHENSIVE METABOLIC PANEL W/ REFLEX TO MG FOR LOW K - Abnormal; Notable for the following components:    Glucose 119 (*)     All other components within normal limits    Narrative:     Performed at:  [facility]  [place], [place]   Phone [phone]   TROPONIN    Narrative:     Performed at:  [facility]  [place], [place]   Phone [phone]   PROTIME-INR    Narrative:     Performed at:  [facility]  [address], [place]   Phone [phone]   LIPASE    Narrative:     Performed at:  [facility]  [place], [place]   Phone [phone]       All other labs were within normal range or not returned as of this dictation.     EMERGENCY DEPARTMENT COURSE and DIFFERENTIAL DIAGNOSIS/MDM:   Vitals:    Vitals:    10/01/20 1545 10/01/20 1700 10/01/20 1730 10/01/20 1900   BP: (!) 226/97 (!) 196/97 (!) 203/97 (!) 159/70   Pulse: 100 109 111 115   Resp: 19 19 19 18   Temp:       TempSrc:       SpO2: 95%      Weight:       Height:           Medications   morphine (PF) injection 2 mg (2 mg Intravenous Given 10/1/20 1530)   ondansetron (ZOFRAN) injection 4 mg (4 mg Intravenous Given 10/1/20 1531)   pantoprazole (PROTONIX) injection 40 mg (40 mg Intravenous Given 10/1/20 1536)   iopamidol (ISOVUE-370) 76 % injection 75 mL (75 mLs Intravenous Given 10/1/20 1617)   meropenem (MERREM) 1 g in sodium chloride 0.9 % 100 mL IVPB (mini-bag) (0 g Intravenous Stopped 10/1/20 1836)       MDM. Cardiac work-up initiated including CT chest abdomen pelvis. Shows contained perforation esophageal    56 -Dr. Malvin Siemens called back from radiology to notify us of the contained perforation. Broad-spectrum antibiotics to be initiated, Dr. Charla Vergara not to be consulted and cardiothoracic to be consulted. 1719-Светлана Mckeon CT surgery was consulted. 1815 - dr. Jo-Ann Burnham ct surg came to see - recs transfer to  as Dr. Mahesh Garcia (esophageal CT surgeon) is out of town and he is concerned pt may need surgery. Case discussed with  for transfer      CRITICAL CARE TIME: 46 minutes excluding billable procedure time: Treatment of patient with esophageal perforation, multiple re-evaluations, discussions with thoracic surgery, complex work-up, potential for deterioration. CONSULTS:  IP CONSULT TO CARDIOTHORACIC SURGERY  IP CONSULT TO CARDIOLOGY    PROCEDURES:  Unless otherwise noted below, none     Procedures    FINAL IMPRESSION      1. Esophageal perforation    2. Acute chest pain    3. Abdominal pain, epigastric    4. Acute sore throat    5. Accelerated hypertension          DISPOSITION/PLAN   DISPOSITION Decision To Transfer 10/01/2020 06:16:28 PM      PATIENT REFERRED TO:  No follow-up provider specified. DISCHARGE MEDICATIONS:  Discharge Medication List as of 10/1/2020  7:32 PM             (Please note:  Portions of this note were completed with a voice recognition program. Efforts were made to edit the dictations but occasionally words and phrases are mis-transcribed.)    Form v2016. J.5-cn    Shilpa Marte DO (electronically signed)  Emergency Medicine Provider              Maria Alejandra Vega DO  10/01/20 [id number]

## 2020-10-01 NOTE — TELEPHONE ENCOUNTER
Spoke to patient and her daughter they are going to talk about going to the closest ED if she is having breathing issues. Right now they said the stomach is hurting worse than her throat. Patient is concerned about her hiatal hernia.   They will call back and let us know if they decide to go to [facility] ED or come back to [facility] to be seen in the ED

## 2020-10-01 NOTE — H&P
Sarina 15    Referring Provider:  Tra Hawkins DO   Cc-racing heart with near syncope  History of Present Illness:   Alee Dyer has a history of porcine aortic valve replacement and paroxysmal a fib. She has had a CVA. Ms Loreto Lugo states she has had 2 strokes prior to last visit; she states she was at [facility] then . She did inpatient rehab at [address] [facility] in August, 2019. Dr. Reji Shook implanted a ILR 8/9/19. She was admitted May 2020 with chest tightness and dyspnea. LHC showed high output CHF due to symptomatic anemia. Taken off Plavix and had GI consult with EGD/colonoscopy. Today, she states that she did not start Xarelto due to the cost. Long discussion regarding the risks vs benefits, and her prior history of strokes. She denies exertional chest pain, FARRIS/PND, light-headedness, edema. She is not very active, does not smoke. She and her  want to fly to [place] in October but not sure if they can go due to the Covid crisis. Since visit has developed rapid persistent heart rate with neearly passing out. She has started xarelto    Past Medical History:   has a past medical history of Aortic valve disease, CAD (coronary artery disease), CVA (cerebral infarction), and Hypertension. Surgical History:   has a past surgical history that includes Coronary angioplasty with stent (12/22/2004); Total knee arthroplasty (Right, 7/15/2013); Cataract removal with implant (Left, 10/9/2015); Cardiac catheterization (11/19/2015); Aortic valve replacement (12/4/2015); Upper gastrointestinal endoscopy (N/A, 5/8/2020); and Colonoscopy (N/A, 5/8/2020). Social History:   reports that she has never smoked. She has never used smokeless tobacco. She reports that she does not drink alcohol or use drugs. Family History:  family history includes Heart Disease in her father. Home Medications:  Prior to Admission medications    Medication Sig Start Date End Date Taking?  Authorizing depression. · Endocrine: No malaise, fatigue or temperature intolerance. No excessive thirst, fluid intake, or urination. No tremor. · Hematologic/Lymphatic: No abnormal bruising or bleeding, blood clots or swollen lymph nodes. · Allergic/Immunologic: No nasal congestion or hives. Physical Examination:    Blood pressure (!) 160/95, pulse 114, resp. rate 18, height 5' 3\" (1.6 m), weight 187 lb (84.8 kg), not currently breastfeeding. Constitutional and General Appearance: alert,oriented, no acute distress  Skin:good turgor,intact without lesions  HEENT: EOMI ,normal  Neck:no JVD    Respiratory:  · Normal excursion and expansion without use of accessory muscles  · Resp Auscultation: Normal breath sounds without dullness  Cardiovascular:  · The apical impulses not displaced  · II/IV JIGAR, Diastolic Murmur  - worsening   · Cervical veins are not engorged  · The carotid upstroke is normal in amplitude and contour without delay or bruit  · Peripheral pulses are symmetrical and full  · There is no clubbing, cyanosis of the extremities. · No edema  · Femoral Arteries: 2+ and equal  · Pedal Pulses: 2+ and equal   Abdomen:  · No masses or tenderness  · Liver/Spleen: No Abnormalities Noted  Neurological/Psychiatric:  · Alert and oriented in all spheres  · Moves all extremities well  · Exhibits normal gait balance and coordination  · No abnormalities of mood, affect, memory, mentation, or behavior are noted    EGD 5/8/2020 (Dr. Nadine Joseph)  Large hiatal hernia, few gastric erosions, bx for H. Pylori    Colonoscopy 5/8/2020 (Dr. Nadine Jsoeph)  Small rectal polyp removed,sigmoid diverticulosis, small hemorrhoids    LHC 5/7/2020  Cath with 2 plus AI,no significant AV gradient.  LVEDP 25 c/w high output CHF  EF 60  40% left main  40% in stent LAD,40% ostial CFX  30% RCA     Imp- high output CHF due to symptomatic anemia     Will give 1 unit of PRBC,IV iron  GI evaluation     ECHO 5/31/19  Normal left ventricle size, wall thickness and systolic function with an   estimated ejection fraction of 60%. No regional wall motion abnormalities   are seen.   E/e\"= 12. Diastolic filling parameters suggests grade I diastolic   dysfunction.   Mild to moderate mitral regurgitation   The left atrium is mildly dilated.   A bioprosthetic aortic valve (21mm Mosaic Ultra) appears well seated with a   maximum gradient of 27 mmHg and a mean gradient of 17 mmHg. Aortic valve   area of 1.4cm2. Mild aortic regurgitation.   Mild tricuspid regurgitation. PASP 24mmHg.   IVC size is normal (<2.1cm) and collapses > 50% with respiration consistent   with normal RA pressure (3mmHg).    Assessment:    CAD  No anginal symptoms. -[facility] 2004> PCI of proximal LAD  -[facility] 2013> Cath with LM normal, LAD mid 30%, distal 40%, D1-40%, D4-50%(small), widely patent proximal stent, Cx Ostial 20%. RCA Mid 30% X2 and LVG 60%. Medically managed  -[facility] 2015> No flow-significant coronary stenoses are identified. The left anterior descending stent is widely patent. -[facility] 5/7/20> 2+ AI, EF 60, 40% LM, 40% in-stent LAD,40% ostial CFX, 30% RCA, Imp- high output CHF due to symptomatic anemia     Aortic Valve Replacement   12/4/15 AVR Mosaic porcine performed by Dr. Cornell Davis. Taking baby asa    Hypertension  Will continue to monitor  Blood pressure (!) 160/95, pulse 114, resp. rate 18, height 5' 3\" (1.6 m), weight 187 lb (84.8 kg), not currently breastfeeding. Atrial fibrillation, history of  Has ILR now implanted by Dr Kirstie Avila. Recent check revealed Afib. Detected while exercising during cardiac rehab  High dose Xarelto gave her bruising. Takes baby asa. EKG 9/26/16> A. fib  EKG 7/3/18> Sinus rhythm - a fib resolved. EKG 5/7/20> NSR 78  Discussed starting xarelto. She started xarelto,now persistent a. fib        Hyperlipidemia  7/23/2020> , TRIG 88, HDL 74,   Taking Crestor 5mg, but admittedly not consistently. She states she had hives from Lipitor.     Anemia, history of   6/17/2020> H&H 12.2/37/7  5/19/20> H&H 10.9/35.2  5/7/20> H&H 8.3/27.0  Continue PO iron       Plan:   [address] has a stable cardiac status. All cardiac test and lab results were personally reviewed by me during this office visit. 1. Start Xarelto 15mg daily   MARIN with CV today    I appreciate the opportunity of cooperating in the care of this individual.    Amanda Abrams. Anaya Vasquez M.D., [address] [facility]    Patient's problem list, medications, allergies, past medical, surgical, social and family histories were reviewed and updated as appropriate.

## 2020-10-01 NOTE — PROGRESS NOTES
Patient:  Anjel Berger   :   1935    A pre-sedation re-evaluation was performed immediately prior to beginning of  the procedure. Procedure: MARIN  Medications: Procedural sedation with full conscious sedation  Complications: None  Estimated Blood Loss: none  Specimens: Were not obtained        [facility] [place] Medication and Procedural Reconciliation:  I agree that the documented medications and procedures performed are true. The medications were given under my order. The procedures were performed under my direct supervision. Versed and fentanyl given over 30 minute period for full conscious sedation    MARIN with no intracardiac thrombus.  Full report to follow

## 2020-10-26 ENCOUNTER — NURSE ONLY (OUTPATIENT)
Dept: CARDIOLOGY CLINIC | Age: 85
End: 2020-10-26
Payer: MEDICARE

## 2020-10-26 PROCEDURE — G2066 INTER DEVC REMOTE 30D: HCPCS | Performed by: INTERNAL MEDICINE

## 2020-10-26 PROCEDURE — 93298 REM INTERROG DEV EVAL SCRMS: CPT | Performed by: INTERNAL MEDICINE

## 2020-10-26 NOTE — PROGRESS NOTES
We received a remote transmission form patient's monitor at home. Remote Linq report shows known AF. Pt remains on Xarelto. EP physician to review. We will continue to monitor remotely.

## 2020-10-26 NOTE — LETTER
[address] [facility] [phone]  Luige Yg [address] [facility] [phone]    Pacemaker/Defibrillator Clinic          10/26/20        Liu Eid  [address] [facility] [address] [facility] [address]        Dear Liu Eid    This letter is to inform you that we received the transmission from your monitor at home that checks your implanted heart device. The next date your monitor will automatically transmit will be 11-30-20. If your report needs attention we will notify you. Your device and monitor are wireless and most transmit cellularly, but please periodically check your monitor is still plugged in to the electrical outlet. If you still use the telephone land line to send please ensure the connection to the phone missy is secure. This will help to ensure successful automatic transmissions in the future. Also, the monitor needs to be close to you while sleeping at night. Please be aware that the remote device transmission sites are periodically monitored only during regular business hours during which simultaneous in-office device clinics are being run. If your transmission requires attention, we will contact you as soon as possible. Thank you.             [place] [address]

## 2020-10-27 NOTE — PROGRESS NOTES
drugs. Family History:  family history includes Heart Disease in her father. Home Medications:  Prior to Admission medications    Medication Sig Start Date End Date Taking? Authorizing Provider   rivaroxaban (XARELTO) 15 MG TABS tablet Take 1 tablet by mouth daily (with breakfast) 9/22/20  Yes Lux Gonzalez MD   ferrous sulfate (IRON 325) 325 (65 Fe) MG tablet Take 1 tablet by mouth 2 times daily  Patient taking differently: Take 325 mg by mouth daily (with breakfast)  5/8/20  Yes Lux Gonzalez MD   pantoprazole (PROTONIX) 40 MG tablet Take 1 tablet by mouth every morning (before breakfast)  Patient taking differently: Take 40 mg by mouth Takes just as needed. 5/8/20  Yes Lux Gonzalez MD   amLODIPine (NORVASC) 5 MG tablet Take 1 tablet by mouth daily 10/8/19  Yes Lux Gonzalez MD   aspirin 81 MG tablet Take 1 tablet by mouth daily 8/8/19  Yes Chrystal Hamilton MD   Coenzyme Q10 (COQ-10 PO) Take by mouth   Yes Historical Provider, MD   therapeutic multivitamin-minerals (THERAGRAN-M) tablet Take 1 tablet by mouth daily. Yes Historical Provider, MD      Allergies:  Lipitor and Penicillins     Review of Systems:   · Constitutional: there has been no unanticipated weight loss. There's been no change in energy level, sleep pattern, or activity level. · Eyes: No visual changes or diplopia. No scleral icterus. · ENT: No Headaches, hearing loss or vertigo. No mouth sores or sore throat. · Cardiovascular: Reviewed in HPI  · Respiratory: No cough or wheezing, no sputum production. No hematemesis. · Gastrointestinal: No abdominal pain, appetite loss, blood in stools. No change in bowel or bladder habits. · Genitourinary: No dysuria, trouble voiding, or hematuria. · Musculoskeletal:  No gait disturbance, weakness or joint complaints. · Integumentary: No rash or pruritis. · Neurological: No headache, diplopia, change in muscle strength, numbness or tingling.  No change in gait, balance, coordination, mood, affect, memory, mentation, behavior. · Psychiatric: No anxiety, no depression. · Endocrine: No malaise, fatigue or temperature intolerance. No excessive thirst, fluid intake, or urination. No tremor. · Hematologic/Lymphatic: No abnormal bruising or bleeding, blood clots or swollen lymph nodes. · Allergic/Immunologic: No nasal congestion or hives. Physical Examination:    Blood pressure (!) 190/100, pulse 66, height 5' 3\" (1.6 m), weight 177 lb 11.2 oz (80.6 kg), not currently breastfeeding. Constitutional and General Appearance: alert,oriented, no acute distress  Skin:good turgor,intact without lesions  HEENT: EOMI ,normal  Neck:no JVD    Respiratory:  · Normal excursion and expansion without use of accessory muscles  · Resp Auscultation: Normal breath sounds without dullness  Cardiovascular:  · The apical impulses not displaced  · II/IV JIGAR, Diastolic Murmur  - worsening   · Cervical veins are not engorged  · The carotid upstroke is normal in amplitude and contour without delay or bruit  · Peripheral pulses are symmetrical and full  · There is no clubbing, cyanosis of the extremities. · No edema  · Femoral Arteries: 2+ and equal  · Pedal Pulses: 2+ and equal   Abdomen:  · No masses or tenderness  · Liver/Spleen: No Abnormalities Noted  Neurological/Psychiatric:  · Alert and oriented in all spheres  · Moves all extremities well  · Exhibits normal gait balance and coordination  · No abnormalities of mood, affect, memory, mentation, or behavior are noted    EGD 5/8/2020 (Dr. Selma Vanegas)  Large hiatal hernia, few gastric erosions, bx for H. Pylori    Colonoscopy 5/8/2020 (Dr. Selma Vanegas)  Small rectal polyp removed,sigmoid diverticulosis, small hemorrhoids    LHC 5/7/2020  Cath with 2 plus AI,no significant AV gradient.  LVEDP 25 c/w high output CHF  EF 60  40% left main  40% in stent LAD,40% ostial CFX  30% RCA     Imp- high output CHF due to symptomatic anemia     Will give 1 unit of PRBC,IV iron  GI evaluation     ECHO 5/31/19  Normal left ventricle size, wall thickness and systolic function with an   estimated ejection fraction of 60%. No regional wall motion abnormalities   are seen.   E/e\"= 12. Diastolic filling parameters suggests grade I diastolic   dysfunction.   Mild to moderate mitral regurgitation   The left atrium is mildly dilated.   A bioprosthetic aortic valve (21mm Mosaic Ultra) appears well seated with a   maximum gradient of 27 mmHg and a mean gradient of 17 mmHg. Aortic valve   area of 1.4cm2. Mild aortic regurgitation.   Mild tricuspid regurgitation. PASP 24mmHg.   IVC size is normal (<2.1cm) and collapses > 50% with respiration consistent   with normal RA pressure (3mmHg).    Assessment:    CAD  No anginal symptoms. -[facility] 2004> PCI of proximal LAD  -[facility] 2013> Cath with LM normal, LAD mid 30%, distal 40%, D1-40%, D4-50%(small), widely patent proximal stent, Cx Ostial 20%. RCA Mid 30% X2 and LVG 60%. Medically managed  -[facility] 2015> No flow-significant coronary stenoses are identified. The left anterior descending stent is widely patent. -[facility] 5/7/20> 2+ AI, EF 60, 40% LM, 40% in-stent LAD,40% ostial CFX, 30% RCA, Imp- high output CHF due to symptomatic anemia     Aortic Valve Replacement   12/4/15 AVR Mosaic porcine performed by Dr. Juan Ruiz. Taking baby asa    Hypertension  Will continue to monitor - recheck by me 120/70   Blood pressure (!) 190/100, pulse 66, height 5' 3\" (1.6 m), weight 177 lb 11.2 oz (80.6 kg), not currently breastfeeding. Atrial fibrillation, history of  Has ILR now implanted by Dr Hadley Lindsey. Recent check revealed Afib. Detected while exercising during cardiac rehab  High dose Xarelto gave her bruising. Takes baby asa. EKG 9/26/16> A. fib  EKG 7/3/18> Sinus rhythm - a fib resolved. EKG 5/7/20> NSR 78  On xarelto 15mg       Hyperlipidemia  7/23/2020> , TRIG 82, HDL 79,   Taking Crestor 5mg    She states she had hives from Lipitor.     Anemia, history of   10/1/2020> H&H 14.6/44.5  6/17/2020> H&H 12.2/37/7  5/19/20> H&H 10.9/35.2  5/7/20> H&H 8.3/27.0    Continue PO iron     Possible Esophageal perforation  She underwent transesophageal echocardiogram with cardioversion for atrial fibrillation on 10/1/2020. She had severe chest and epigastric pain afterwards, esophagram was negative for leak but given severe pain and leukocytosis was treated as small perforation. She was treated with antibiotics and diet resumed when pain was resolved. Plan:   Palomo Rangel has a stable cardiac status. All cardiac test and lab results were personally reviewed by me during this office visit. 1. CBC and Renal  2. Stop aspirin 81 MG tablet    3. She has follow up with  GI scheduled       I appreciate the opportunity of cooperating in the care of this individual.    Gin Davila. Kathia Zeng M.D., [facility]    Patient's problem list, medications, allergies, past medical, surgical, social and family histories were reviewed and updated as appropriate. Scribe's attestation: This note was scribed in the presence of Dr. Kathia Zeng MD, by Brandon Tirado RN. The scribe's documentation has been prepared under my direction and personally reviewed by me in its entirety. I confirm that the note above accurately reflects all work, treatment, procedures, and medical decision making performed by me.

## 2020-10-29 ENCOUNTER — OFFICE VISIT (OUTPATIENT)
Dept: CARDIOLOGY CLINIC | Age: 85
End: 2020-10-29
Payer: MEDICARE

## 2020-10-29 VITALS
WEIGHT: 177.7 LBS | HEIGHT: 63 IN | BODY MASS INDEX: 31.48 KG/M2 | SYSTOLIC BLOOD PRESSURE: 120 MMHG | DIASTOLIC BLOOD PRESSURE: 70 MMHG | HEART RATE: 66 BPM

## 2020-10-29 PROCEDURE — 93000 ELECTROCARDIOGRAM COMPLETE: CPT | Performed by: INTERNAL MEDICINE

## 2020-10-29 PROCEDURE — 99214 OFFICE O/P EST MOD 30 MIN: CPT | Performed by: INTERNAL MEDICINE

## 2020-11-02 LAB
ALBUMIN SERPL-MCNC: 4.1 G/DL (ref 3.5–5)
ANION GAP SERPL CALCULATED.3IONS-SCNC: 8 MMOL/L (ref 6–18)
BASOPHILS ABSOLUTE: 0 THOU/MCL (ref 0–0.2)
BASOPHILS ABSOLUTE: 1 %
BUN BLDV-MCNC: 16 MG/DL (ref 8–26)
CALCIUM SERPL-MCNC: 9.8 MG/DL (ref 8.8–10.1)
CHLORIDE BLD-SCNC: 101 MEQ/L (ref 98–111)
CO2: 28 MMOL/L (ref 21–31)
CREAT SERPL-MCNC: 0.72 MG/DL (ref 0.44–1.03)
EOSINOPHILS ABSOLUTE: 0.1 THOU/MCL (ref 0.03–0.45)
EOSINOPHILS RELATIVE PERCENT: 2 %
GFR AFRICAN AMERICAN: 86 ML/MIN/1.73 M2
GFR NON-AFRICAN AMERICAN: 75 ML/MIN/1.73 M2
GLUCOSE BLD-MCNC: 96 MG/DL (ref 70–99)
HCT VFR BLD CALC: 40.6 % (ref 36–46)
HEMOGLOBIN: 13.5 G/DL (ref 12–15.2)
LYMPHOCYTES ABSOLUTE: 0.8 THOU/MCL (ref 1–4)
LYMPHOCYTES RELATIVE PERCENT: 23 %
MCH RBC QN AUTO: 30.6 PG (ref 27–33)
MCHC RBC AUTO-ENTMCNC: 33.3 G/DL (ref 32–36)
MCV RBC AUTO: 91.9 FL (ref 82–97)
MONOCYTES # BLD: 10 %
MONOCYTES ABSOLUTE: 0.3 THOU/MCL (ref 0.2–0.9)
NEUTROPHILS ABSOLUTE: 2.2 THOU/MCL (ref 1.8–7.7)
PDW BLD-RTO: 14.8 %
PHOSPHORUS: 3.2 MG/DL (ref 2.5–4.5)
PLATELET # BLD: 142 THOU/MCL (ref 140–375)
PMV BLD AUTO: 11 FL (ref 7.4–11.5)
POTASSIUM SERPL-SCNC: 4.8 MEQ/L (ref 3.6–5.1)
RBC # BLD: 4.41 MIL/MCL (ref 3.8–5.2)
SEG NEUTROPHILS: 64 %
SODIUM BLD-SCNC: 137 MEQ/L (ref 135–145)
WBC # BLD: 3.4 THOU/MCL (ref 3.6–10.5)

## 2020-11-18 ENCOUNTER — TELEPHONE (OUTPATIENT)
Dept: CARDIOLOGY CLINIC | Age: 85
End: 2020-11-18

## 2020-11-18 RX ORDER — AMIODARONE HYDROCHLORIDE 200 MG/1
200 TABLET ORAL 2 TIMES DAILY
Qty: 30 TABLET | Refills: 3 | Status: SHIPPED | OUTPATIENT
Start: 2020-11-18 | End: 2020-11-25

## 2020-11-18 NOTE — TELEPHONE ENCOUNTER
Pt calling thinks she is back in AFib for about 5 days now and needs to know what to do?  Pls call to advise Thank you

## 2020-11-18 NOTE — TELEPHONE ENCOUNTER
Last device check on 10/26 Per Dr. Elizalde in clinic, to contact patient's caregivers with the following verbal orders:    On patient's day of surgery, give patient 2 units of Lantus rather than the normal 3 units in the morning.    Dr. Elizalde reports he spoke with Katrina Corley (Diabetic Educator) at the [facility]/Peds Endocrinology regarding this verbal order. Verbal read back was performed with patient's caregiver Sheron who is with patient and brought patient to pre-op appointment to see Dr. Elizalde. Sheron verbalized understanding and read back to triage RN (writer) instructions given. Sheron also reports the following: patient's blood sugar after lunch around 3:30pm was 350 range, Sheron reports she gave 1 full unit of insulin as his blood sugar meter was showing his blood sugar was going up. Sheron reports patient just finished bath time and had food and is doing well.    Patient's mom Sobeida was also informed of Dr. Elizalde's verbal order, verbal read back was performed to ensure accuracy, Sobeida vocalized understanding, in agreement with plan, and had no further questions.     Will forward to Dr. Elizalde to acknowledge and for review of patient update.    Esther Upton, RN on 9/18/2020 at 4:33 PM

## 2020-11-18 NOTE — TELEPHONE ENCOUNTER
She will stop norvasc. Script sent for amiodarone 200 mg bid to start today.  Please schedule her for ov with ECG on Monday

## 2020-11-23 ENCOUNTER — TELEPHONE (OUTPATIENT)
Dept: CARDIOLOGY CLINIC | Age: 85
End: 2020-11-23

## 2020-11-23 NOTE — TELEPHONE ENCOUNTER
Spoke to the pt, she is feeling \"terrible\", worst night last night. Symptoms ongoing for the last week. Tried to explain loop interrogation results, she feels she is in Afib \"all the time\". She will not see NP. She does not feel she can wait another week. BP has been running low, 90/59, 102/80, 100/77.

## 2020-11-23 NOTE — PROGRESS NOTES
[facility]   Electrophysiology Follow Up  Date: 11/25/2020      Chief Complaint   Patient presents with    Atrial Fibrillation        HPI: Cyndi Ramirez is a 80 y.o. has a history of CAD and diagnosed with aortic valve endocarditis during her work up for CVA. She was found to have a mass or vegetation on her aortic valve and it was suspected to be the source of the stroke. AVR was performed on 12/4/15 by Dr. Cecilia Haile. She was on ASA and Plavix after surgery. Her post-op complications were a pneumothorax and C. Diff. Recently while in cardiac rehab, she was noted to have episodes of PAF. EKG was done on 9/26/16 which confirmed Atrial fib. She was started on Xarelto by Dr. Kathy Villanueva after A fib was diagnosed but she states it is too expensive    Jennie Lockhart presents to the office in follow up. She is very symptomatic with her episodes of atrial fibrillation. She has not taken her amiodarone yet and we encouraged her to start taking this to help with rate control. She had a microperforation of esophagus during a MARIN due to presence of hiatal hernia. She was treated medically. Past Medical History:   Diagnosis Date    Aortic valve disease     CAD (coronary artery disease)     CVA (cerebral infarction) 10/2015    Hypertension         Past Surgical History:   Procedure Laterality Date    AORTIC VALVE REPLACEMENT  12/4/2015    AVR porcine by  [id number] CATHETERIZATION  11/19/2015    Dr. Sonny Montoya - no flow restrictive stenoses in coronary arteries    CATARACT REMOVAL WITH IMPLANT Left 10/9/2015    COLONOSCOPY N/A 5/8/2020    COLONOSCOPY POLYPECTOMY SNARE/COLD BIOPSY performed by Elizabeth Gutiérrez MD at [facility]  12/22/2004    TOTAL KNEE ARTHROPLASTY Right 7/15/2013    UPPER GASTROINTESTINAL ENDOSCOPY N/A 5/8/2020    EGD BIOPSY performed by Elizabeth Gutiérrez MD at [address] [place] [address]       Allergies:   Allergies   Allergen Reactions    Lipitor Hives    Penicillins        Social History:   reports that she has never smoked. She has never used smokeless tobacco. She reports that she does not drink alcohol or use drugs. Family History:  family history includes Heart Disease in her father. Reviewed. Denies family history of sudden cardiac death, arrhythmia, premature CAD    Review of System:  All other systems reviewed and are negative except for that noted above. Pertinent negatives are:   General: negative for fever, chills   Ophthalmic ROS: negative for - eye pain or loss of vision  ENT ROS: negative for - headaches, sore throat   Respiratory: negative for - cough, sputum  Cardiovascular: Reviewed in HPI  Gastrointestinal: negative for - abdominal pain, diarrhea, N/V  Hematology: negative for - bleeding, blood clots, bruising or jaundice  Genito-Urinary:  negative for - Dysuria or incontinence  Musculoskeletal: negative for - Joint swelling, muscle pain  Neurological: negative for - confusion, dizziness, headaches   Psychiatric: No anxiety, no depression. Dermatological: negative for - rash    Physical Examination:  Vitals:    11/25/20 1343   BP: 120/82   Pulse: 132   SpO2: 98%      Wt Readings from Last 3 Encounters:   11/25/20 183 lb 12.8 oz (83.4 kg)   10/29/20 177 lb 11.2 oz (80.6 kg)   10/01/20 181 lb (82.1 kg)       · Constitutional: Oriented. No distress. · Head: Normocephalic and atraumatic. · Mouth/Throat: Oropharynx is clear and moist.   · Eyes: Conjunctivae normal. EOM are normal.   · Neck: Neck supple. No rigidity. No JVD present. · Cardiovascular: Normal rate, irregular rhythm, S1&S2. · Pulmonary/Chest: Bilateral respiratory sounds. No wheezes, No rhonchi. · Abdominal: Soft. Bowel sounds present. No distension, No tenderness. · Musculoskeletal: No tenderness. No edema    · Lymphadenopathy: Has no cervical adenopathy. · Neurological: Alert and oriented.  Cranial nerve appears intact, No Gross deficit   · Skin: Skin is warm and dry. No rash noted. · Psychiatric: Has a normal behavior       Labs, diagnostic and imaging results reviewed. Reviewed. Lab Results   Component Value Date    CREATININE 0.72 2020    CREATININE 0.6 10/01/2020    AST 26 10/01/2020    ALT 18 10/01/2020       EC20  Atrial fibrillation    [facility] 2020  Cath with 2 plus AI,no significant AV gradient. LVEDP 25 c/w high output CHF  EF 60  40% left main  40% in stent LAD,40% ostial CFX  30% RCA     Imp- high output CHF due to symptomatic anemia     Will give 1 unit of PRBC,IV iron  GI evaluation      ECHO 19  Normal left ventricle size, wall thickness and systolic function with an   estimated ejection fraction of 60%. No regional wall motion abnormalities   are seen.   E/e\"= 12. Diastolic filling parameters suggests grade I diastolic   dysfunction.   Mild to moderate mitral regurgitation   The left atrium is mildly dilated.   A bioprosthetic aortic valve (21mm Mosaic Ultra) appears well seated with a   maximum gradient of 27 mmHg and a mean gradient of 17 mmHg. Aortic valve   area of 1.4cm2. Mild aortic regurgitation.   Mild tricuspid regurgitation. PASP 24mmHg.   IVC size is normal (<2.1cm) and collapses > 50% with respiration consistent   with normal RA pressure (3mmHg).     Medication:  Current Outpatient Medications   Medication Sig Dispense Refill    amiodarone (CORDARONE) 200 MG tablet Take 1 tablet by mouth 3 times daily 90 tablet 3    rivaroxaban (XARELTO) 15 MG TABS tablet Take 1 tablet by mouth daily (with breakfast) 90 tablet 3    ferrous sulfate (IRON 325) 325 (65 Fe) MG tablet Take 1 tablet by mouth 2 times daily (Patient taking differently: Take 325 mg by mouth daily (with breakfast) ) 60 tablet 5    pantoprazole (PROTONIX) 40 MG tablet Take 1 tablet by mouth every morning (before breakfast) (Patient taking differently: Take 40 mg by mouth Takes just as needed.) 30 tablet 5    Coenzyme Q10 (COQ-10 PO) Take by mouth      therapeutic multivitamin-minerals (THERAGRAN-M) tablet Take 1 tablet by mouth daily. No current facility-administered medications for this visit. Assessment and plan:     PAF  -ECG today shows Atrial fibrillation   -Afib noted as starting on 11/14/2020 and has continued  -encouraged her to start amiodarone today and take for 3-4 weeks to see if she can be chemically converted. If not we discussed doing a DCCV and then continue her amiodarone for a few months.   -Amiodarone TID for two weeks than twice a day one week after then daily after that  -S/p MARIN/DCCV 10/1/2020 with possible esophageal perforation  -Takes Xarelto 15 mg d/t 20 mg causing bruising, CrCl 77      The plan will be to give her amiodarone 200 mg 3 times a day for the next 2 weeks and then do a cardioversion. At that time we will decrease her to 200 mg twice a day for a week and then after that 200 mg daily to be continued until she comes back and see us us after her trip to [place]. At that point we may consider taking her off of amiodarone and seeing if her A. fib comes back. In the long run we may consider ablation. Shortness of breath  -Complains of being short of breath  -May be related to her arrhythmia and atrial fibrillation  -She has correlated this with her onset of atrial fibrillation        HTN  Vitals:    11/25/20 1343   BP: 120/82   Pulse: 132   SpO2: 98%   -Well controlled today  -Home BP monitoring encourage with a BP goal <130/80    CAD  -followed by   -Denies angina  -[facility] 2004> PCI of proximal LAD  -[facility] 2013> Cath with LM normal, LAD mid 30%, distal 40%, D1-40%, D4-50%(small), widely patent proximal stent, Cx Ostial 20%. RCA Mid 30% X2 and LVG 60%. Medically managed  -[facility] 2015> No flow-significant coronary stenoses are identified. The left anterior descending stent is widely patent.    -[facility] 5/7/20> 2+ AI, EF 60, 40% LM, 40% in-stent LAD,40% ostial CFX, 30% RCA, Imp- high output CHF due to symptomatic anemia     AVR  -12/4/15 AVR mosaic porcine by   -On baby ASA    Plan  The plan will be to give her amiodarone 200 mg 3 times a day for the next 2 weeks and then do a cardioversion. At that time we will decrease her to 200 mg twice a day for a week and then after that 200 mg daily to be continued until she comes back and see us us after her trip to [place]. At that point we may consider taking her off of amiodarone and seeing if her A. fib comes back. In the long run we may consider ablation. I independently reviewed device check interrogation     Thank you for allowing me to participate in the care of [address] [place] [address]. Further evaluation will be based upon the patient's clinical course and testing results. All questions and concerns were addressed to the patient/family. Alternatives to my treatment were discussed. I have discussed the above stated plan and the patient verbalized understanding and agreed with the plan. NOTE: This report was transcribed using voice recognition software. Every effort was made to ensure accuracy, however, inadvertent computerized transcription errors may be present. Jennifer Villatoro MD, Symone Hickman [address] [place] [address]   Office: [phone]     Scribe attestation:  This note was scribed in the presence of Jennifer Villatoro MD by Vishnu Beasley RN    I, Dr. Jennifer Villatoro personally performed the services described in this documentation as scribed by RN in my presence, and it is both accurate and complete.

## 2020-11-25 ENCOUNTER — OFFICE VISIT (OUTPATIENT)
Dept: CARDIOLOGY CLINIC | Age: 85
End: 2020-11-25
Payer: MEDICARE

## 2020-11-25 ENCOUNTER — NURSE ONLY (OUTPATIENT)
Dept: CARDIOLOGY CLINIC | Age: 85
End: 2020-11-25
Payer: MEDICARE

## 2020-11-25 VITALS
HEIGHT: 63 IN | OXYGEN SATURATION: 98 % | DIASTOLIC BLOOD PRESSURE: 82 MMHG | BODY MASS INDEX: 32.57 KG/M2 | SYSTOLIC BLOOD PRESSURE: 120 MMHG | HEART RATE: 132 BPM | WEIGHT: 183.8 LBS

## 2020-11-25 PROCEDURE — 99214 OFFICE O/P EST MOD 30 MIN: CPT | Performed by: INTERNAL MEDICINE

## 2020-11-25 PROCEDURE — 93291 INTERROG DEV EVAL SCRMS IP: CPT | Performed by: INTERNAL MEDICINE

## 2020-11-25 RX ORDER — AMIODARONE HYDROCHLORIDE 200 MG/1
200 TABLET ORAL 3 TIMES DAILY
Qty: 90 TABLET | Refills: 3 | Status: SHIPPED | OUTPATIENT
Start: 2020-11-25 | End: 2021-10-08

## 2020-11-25 NOTE — LETTER
[facility]  [facility] Procedure [facility]    11/25/20  Jessica Eid  1935  EP Procedures   Pacemaker implant (single/dual)  EP Study    ICD implant (single/dual)  Atrial flutter ablation (MARIN Y/N)    Biv implant ICD  Tilt Table    Biv implant PPM  Atrial fibrillation ablation (MARIN Yes)    Generator Change (PPM/ICD/BiV)  SVT ablation    Lead revision (RV/LA/RA) (<1 month)  VT ablation      Lead extraction +/- upgrade (BiV/PPM/ICD)  VT Ischemic/ non-ischemic    Loop implant/ removal  VT RVOT   xxx Cardioversion  VT Left sided    MARIN  AVN ablation     Equipment   [employer]tr[employer]   VIVIAN Mapping System    [place]  [address] [address]  CryoAblation    [employer]ron[employer]  Laser Lead Extraction     EP Procedures Scheduling Request  # hours Requested   Scheduled  Date:   Specific Day 2 weeks Completed    Anesthesia  F/u Date:   CT surgery backup  COVID     Overnight stay      Location [facility]       Pre-Procedure Labs / Imaging   PT/INR  Type & cross    CBC  Units PRBC    BMP/Mg  Units FFP    Venogram  Cardiac CTA for Pulmonary vein mapping     RN INITIALS: RA    Patient Instructions  Do not eat or drink after midnight the night prior to procedure  Dx:afib  On xarelto 15 mg daily

## 2020-11-25 NOTE — PROGRESS NOTES
MDT ILR   Presenting in Afib since 11/14/20  Multiple \"tachy episoeds - vrates appear to be no under control.

## 2020-11-27 ENCOUNTER — TELEPHONE (OUTPATIENT)
Dept: CARDIOLOGY CLINIC | Age: 85
End: 2020-11-27

## 2020-11-27 NOTE — TELEPHONE ENCOUNTER
I spoke to Colin Alvarenga and confirmed her medications: amlodipine stopped 11/18/20 by Dr. Quan Pat and started on amiodarone 200mg bid; however, she did not start taking it until 11/25/2020 when she saw Dr. Rabia Morgan who inceased the dose to tid x 2 weeks, then he wants her cardioverted. Colin Alvarenga asked me to update [facility] which I did at [phone] (I was connected to Dr. Juve Campos). Dr. Cecil Britton nurse scheduled a f/u trey't for her with him on 12/2/2020 at 10:15 in [facility] office. Colin Alvarenga agrees to that trey't and knows it is in [place].

## 2020-11-27 NOTE — TELEPHONE ENCOUNTER
Daughter calling to let BING & his RN that Isabela Ryan is currently being transported by [employer] to [facility], she is in AFIB and having a very difficult time breathing. Please call either daughter with any questions. Thank you.

## 2020-12-01 NOTE — PROGRESS NOTES
[address]   Electrophysiology Follow Up  Date: 12/2/2020      No chief complaint on file. HPI: Alphonse Pugh is a 80 y.o. has a history of CAD and diagnosed with aortic valve endocarditis during her work up for CVA. She was found to have a mass or vegetation on her aortic valve and it was suspected to be the source of the stroke. AVR was performed on 12/4/15 by Dr. Bibi Dorado. She was on ASA and Plavix after surgery. Her post-op complications were a pneumothorax and C. Diff. Recently while in cardiac rehab, she was noted to have episodes of PAF. EKG was done on 9/26/16 which confirmed Atrial fib. She was started on Xarelto by Dr. Renee Mancia after A fib was diagnosed but she states it is too expensive    She had a microperforation of esophagus during a MARIN due to presence of hiatal hernia. She was treated medically. Admitted to [facility] 11/27 for Chest pressure and palpitations,found to be in Afib w/'s. Tachy cardia induced heart failure. Received IV amiodorone then transitioned to oral amiodorone. Discharged home on 11/29. Amiodorone 200mg bid, Lasix 40 mg daily for five days. Ethelene Rutland presents today in follow up to recent hospitalization. Complains of lower extremity edema. SOB has improved. Past Medical History:   Diagnosis Date    Aortic valve disease     CAD (coronary artery disease)     CVA (cerebral infarction) 10/2015    Hypertension         Past Surgical History:   Procedure Laterality Date    AORTIC VALVE REPLACEMENT  12/4/2015    AVR porcine by DR. Lao [address] CATHETERIZATION  11/19/2015    Dr. Ángela Alonso - no flow restrictive stenoses in coronary arteries    CATARACT REMOVAL WITH IMPLANT Left 10/9/2015    COLONOSCOPY N/A 5/8/2020    COLONOSCOPY POLYPECTOMY SNARE/COLD BIOPSY performed by Armen Cevallos [facility] at [facility]  12/22/2004    TOTAL KNEE ARTHROPLASTY Right 7/15/2013    UPPER GASTROINTESTINAL ENDOSCOPY N/A 5/8/2020    EGD BIOPSY performed by Van John MD at [address]       Allergies: Allergies   Allergen Reactions    Lipitor Hives    Penicillins        Social History:   reports that she has never smoked. She has never used smokeless tobacco. She reports that she does not drink alcohol or use drugs. Family History:  family history includes Heart Disease in her father. Reviewed. Denies family history of sudden cardiac death, arrhythmia, premature CAD    Review of System:  All other systems reviewed and are negative except for that noted above. Pertinent negatives are:   General: negative for fever, chills   Ophthalmic ROS: negative for - eye pain or loss of vision  ENT ROS: negative for - headaches, sore throat   Respiratory: negative for - cough, sputum  Cardiovascular: Reviewed in HPI  Gastrointestinal: negative for - abdominal pain, diarrhea, N/V  Hematology: negative for - bleeding, blood clots, bruising or jaundice  Genito-Urinary:  negative for - Dysuria or incontinence  Musculoskeletal: negative for - Joint swelling, muscle pain  Neurological: negative for - confusion, dizziness, headaches   Psychiatric: No anxiety, no depression. Dermatological: negative for - rash    Physical Examination:  Vitals:    12/02/20 1252   BP: 136/86   Pulse: 105   SpO2: 99%      Wt Readings from Last 3 Encounters:   12/02/20 177 lb 6.4 oz (80.5 kg)   11/25/20 183 lb 12.8 oz (83.4 kg)   10/29/20 177 lb 11.2 oz (80.6 kg)       · Constitutional: Oriented. No distress. · Head: Normocephalic and atraumatic. · Mouth/Throat: Oropharynx is clear and moist.   · Eyes: Conjunctivae normal. EOM are normal.   · Neck: Neck supple. No rigidity. No JVD present. · Cardiovascular: Normal rate, irregular rhythm, S1&S2. · Pulmonary/Chest: Bilateral respiratory sounds. No wheezes, No rhonchi. · Abdominal: Soft. Bowel sounds present. No distension, No tenderness. · Musculoskeletal: No tenderness.  No edema    · Lymphadenopathy: Has no cervical adenopathy. · Neurological: Alert and oriented. Cranial nerve appears intact, No Gross deficit   · Skin: Skin is warm and dry. No rash noted. · Psychiatric: Has a normal behavior       Labs, diagnostic and imaging results reviewed. Reviewed. Lab Results   Component Value Date    CREATININE 0.72 2020    CREATININE 0.6 10/01/2020    AST 26 10/01/2020    ALT 18 10/01/2020       EC20 atrial fibrillation. [facility] 2020  Cath with 2 plus AI,no significant AV gradient. LVEDP 25 c/w high output CHF  EF 60  40% left main  40% in stent LAD,40% ostial CFX  30% RCA  Imp- high output CHF due to symptomatic anemia       ECHO 19  Normal left ventricle size, wall thickness and systolic function with an   estimated ejection fraction of 60%. No regional wall motion abnormalities   are seen.   E/e\"= 12. Diastolic filling parameters suggests grade I diastolic   dysfunction.   Mild to moderate mitral regurgitation   The left atrium is mildly dilated.   A bioprosthetic aortic valve (21mm Mosaic Ultra) appears well seated with a   maximum gradient of 27 mmHg and a mean gradient of 17 mmHg. Aortic valve   area of 1.4cm2. Mild aortic regurgitation.   Mild tricuspid regurgitation. PASP 24mmHg.   IVC size is normal (<2.1cm) and collapses > 50% with respiration consistent   with normal RA pressure (3mmHg).     Medication:  Current Outpatient Medications   Medication Sig Dispense Refill    furosemide (LASIX) 20 MG tablet Take 1 tablet by mouth daily 90 tablet 1    potassium chloride (KLOR-CON M) 20 MEQ extended release tablet Take 1 tablet by mouth daily 90 tablet 3    amiodarone (CORDARONE) 200 MG tablet Take 1 tablet by mouth 3 times daily (Patient taking differently: Take 200 mg by mouth 2 times daily ) 90 tablet 3    rivaroxaban (XARELTO) 15 MG TABS tablet Take 1 tablet by mouth daily (with breakfast) 90 tablet 3    ferrous sulfate (IRON 325) 325 (65 Fe) MG tablet Take 1 tablet by mouth 2 times daily (Patient taking differently: Take 325 mg by mouth daily (with breakfast) ) 60 tablet 5    pantoprazole (PROTONIX) 40 MG tablet Take 1 tablet by mouth every morning (before breakfast) (Patient taking differently: Take 40 mg by mouth Takes just as needed.) 30 tablet 5    Coenzyme Q10 (COQ-10 PO) Take by mouth      therapeutic multivitamin-minerals (THERAGRAN-M) tablet Take 1 tablet by mouth daily. No current facility-administered medications for this visit. Assessment and plan:     PAF  -ECG 12/02/20  Atrial fibrillation  -Afib noted as starting on 11/14/2020 and has continued  Mal Iron presents to the office in follow up. Pt admitted to [facility] 11/27 for Chest pressure and palpitations,found to be in Afib w/'s. Tachy cardia induced heart failure. Received IV amiodorone then transitioned to oral amiodorone. Discharged home on 11/29. Amiodorone 200mg bid, and Lasix 40 -S/p MARIN/DCCV 10/1/2020 with possible esophageal perforation.  -Xarelto 15 mg   -amiodarone 200mg BID  I would continue amiodarone for the next 2 weeks and cardioversion after that. -DCCV in two weeks. I  Have discussed with patient side effects of amiodarone including but not limited to thyroid disease, discoloration of skin and cornea and pulmonary fibrosis. Patient would like to continue with it. Shortness of breath and edema  -She has correlated this with her onset of atrial fibrillation  - has improved since last visit. Today she has no more edema  We will continue Lasix 20 mg daily plus potassium supplementation. We will do an echocardiogram hopefully next week to assess for LV function.       HTN  Vitals:    12/02/20 1252   BP: 136/86   Pulse: 105   SpO2: 99%   -Well controlled today  -Home BP monitoring encourage with a BP goal <130/80    CAD  -followed by   -Kathy angina       AVR  -12/4/15 AVR mosaic porcine by   -On baby ASA    Plan  Schedule DCCV i12/16  Continue lasix 20mg daily  Potassium 20 meq daily  Check BNP and BMP next week  Schedule echo next week. Follow up in 2 months      Thank you for allowing me to participate in the care of [address] [place] [address]. Further evaluation will be based upon the patient's clinical course and testing results. All questions and concerns were addressed to the patient/family. Alternatives to my treatment were discussed. I have discussed the above stated plan and the patient verbalized understanding and agreed with the plan. NOTE: This report was transcribed using voice recognition software. Every effort was made to ensure accuracy, however, inadvertent computerized transcription errors may be present. Sandeep Parekh MD, [place] Hopson [address] [facility]   Office: [phone]     Scribe attestation:  This note was scribed in the presence of Sandeep Parekh MD by Herminio Isaac RN    I, Dr. Sandeep Parekh personally performed the services described in this documentation as scribed by RN in my presence, and it is both accurate and complete.

## 2020-12-02 ENCOUNTER — OFFICE VISIT (OUTPATIENT)
Dept: CARDIOLOGY CLINIC | Age: 85
End: 2020-12-02
Payer: MEDICARE

## 2020-12-02 VITALS
DIASTOLIC BLOOD PRESSURE: 86 MMHG | SYSTOLIC BLOOD PRESSURE: 136 MMHG | HEIGHT: 63 IN | OXYGEN SATURATION: 99 % | WEIGHT: 177.4 LBS | HEART RATE: 105 BPM | BODY MASS INDEX: 31.43 KG/M2

## 2020-12-02 PROCEDURE — 93000 ELECTROCARDIOGRAM COMPLETE: CPT | Performed by: INTERNAL MEDICINE

## 2020-12-02 PROCEDURE — 99214 OFFICE O/P EST MOD 30 MIN: CPT | Performed by: INTERNAL MEDICINE

## 2020-12-02 RX ORDER — POTASSIUM CHLORIDE 20 MEQ/1
20 TABLET, EXTENDED RELEASE ORAL DAILY
Qty: 90 TABLET | Refills: 3 | Status: SHIPPED | OUTPATIENT
Start: 2020-12-02 | End: 2020-12-15

## 2020-12-02 RX ORDER — FUROSEMIDE 40 MG/1
TABLET ORAL
COMMUNITY
Start: 2020-11-30 | End: 2020-12-02 | Stop reason: ALTCHOICE

## 2020-12-02 RX ORDER — FUROSEMIDE 20 MG/1
20 TABLET ORAL DAILY
Qty: 90 TABLET | Refills: 1 | Status: SHIPPED | OUTPATIENT
Start: 2020-12-02 | End: 2021-04-29 | Stop reason: SDUPTHER

## 2020-12-02 NOTE — LETTER
[facility]  EP Procedure Sheet    12/2/20  Kasia Eid  1935  EP Procedures     Pacemaker implant (single/dual)  EP Study    ICD implant (single/dual)  Atrial flutter ablation (MARIN Y/N)    Biv implant ICD  Tilt Table    Biv implant PPM  Atrial fibrillation ablation (MARIN Yes)    Generator Change (PPM/ICD/BiV)  SVT ablation    Lead revision (RV/LA/RA) (<1 month)  VT ablation      Lead extraction +/- upgrade (BiV/PPM/ICD)  VT Ischemic/ non-ischemic    Loop implant/ removal  VT RVOT   xxx Cardioversion  VT Left sided    MARIN  AVN ablation     Equipment     [employer]tronic   VIVIAN Mapping System    [place]  [address] [address]  CryoAblation    [employer]  Laser Lead Extraction     EP Procedures Scheduling Request    # hours Requested   Scheduled  Date:   Specific Day 12/16/2020 Completed    Anesthesia  F/u Date:   CT surgery backup  COVID     Overnight stay      Location [facility]       Pre-Procedure Labs / Imaging     PT/INR  Type & cross    CBC  Units PRBC    BMP/Mg  Units FFP    Venogram  Cardiac CTA for Pulmonary vein mapping     RN INITIALS: DW    Patient Instructions  Do not eat or drink after midnight the night prior to procedure  Dx: atrail fibrialltions  On xarelto 15 mg

## 2020-12-14 ENCOUNTER — TELEPHONE (OUTPATIENT)
Dept: CARDIOLOGY CLINIC | Age: 85
End: 2020-12-14

## 2020-12-14 NOTE — TELEPHONE ENCOUNTER
I spoke to Alexia Fortune and went over the DCCV procedure. I confirmed her arrival time, went over pre-op instructions, reiterated she cannot drive home; she can only have one family member with her (she wanted to bring  and daughter). She stated understanding.

## 2020-12-14 NOTE — TELEPHONE ENCOUNTER
Pt daughter called stating that the pt is getting nervous and wants the nurse to explain what they are going to do during the procedure.   pls call Alexandru Bo at [phone], if you cant not reach her call her daughter Suzy Mak [phone]

## 2020-12-14 NOTE — TELEPHONE ENCOUNTER
Pharm states pt is having a hard time swallowing the potassium chloride (KLOR-CON M) 20 MEQ and they have a smaller plain klor-con  10 meq with out the M. Can script be changed to take 2 aday.   Please send new script or call to advise

## 2020-12-15 RX ORDER — POTASSIUM CHLORIDE 1.5 G/1.77G
20 POWDER, FOR SOLUTION ORAL DAILY
Qty: 30 EACH | Refills: 5 | Status: SHIPPED | OUTPATIENT
Start: 2020-12-15 | End: 2021-04-29

## 2020-12-15 NOTE — TELEPHONE ENCOUNTER
Clarified with pharmacy staff of the changes and notified RN . Pt is going to be taking Potassium Chloride 10 meq po BID as the 20 meq was trouble swallowing .  Call complete

## 2020-12-16 ENCOUNTER — HOSPITAL ENCOUNTER (OUTPATIENT)
Dept: CARDIAC CATH/INVASIVE PROCEDURES | Age: 85
Discharge: HOME | End: 2020-12-16
Attending: INTERNAL MEDICINE | Admitting: INTERNAL MEDICINE
Payer: MEDICARE

## 2020-12-16 VITALS
BODY MASS INDEX: 31.36 KG/M2 | DIASTOLIC BLOOD PRESSURE: 98 MMHG | HEIGHT: 63 IN | SYSTOLIC BLOOD PRESSURE: 189 MMHG | WEIGHT: 177 LBS | OXYGEN SATURATION: 98 %

## 2020-12-16 LAB
EKG ATRIAL RATE: 258 BPM
EKG ATRIAL RATE: 67 BPM
EKG DIAGNOSIS: NORMAL
EKG DIAGNOSIS: NORMAL
EKG P AXIS: 59 DEGREES
EKG P-R INTERVAL: 194 MS
EKG Q-T INTERVAL: 400 MS
EKG Q-T INTERVAL: 462 MS
EKG QRS DURATION: 86 MS
EKG QRS DURATION: 90 MS
EKG QTC CALCULATION (BAZETT): 488 MS
EKG QTC CALCULATION (BAZETT): 489 MS
EKG R AXIS: -16 DEGREES
EKG R AXIS: -23 DEGREES
EKG T AXIS: 49 DEGREES
EKG T AXIS: 61 DEGREES
EKG VENTRICULAR RATE: 67 BPM
EKG VENTRICULAR RATE: 90 BPM

## 2020-12-16 PROCEDURE — 99152 MOD SED SAME PHYS/QHP 5/>YRS: CPT | Performed by: INTERNAL MEDICINE

## 2020-12-16 PROCEDURE — 92960 CARDIOVERSION ELECTRIC EXT: CPT | Performed by: INTERNAL MEDICINE

## 2020-12-16 PROCEDURE — 93010 ELECTROCARDIOGRAM REPORT: CPT | Performed by: INTERNAL MEDICINE

## 2020-12-16 PROCEDURE — 7100000010 HC PHASE II RECOVERY - FIRST 15 MIN

## 2020-12-16 PROCEDURE — 2500000003 HC RX 250 WO HCPCS

## 2020-12-16 PROCEDURE — 92960 CARDIOVERSION ELECTRIC EXT: CPT

## 2020-12-16 PROCEDURE — 93005 ELECTROCARDIOGRAM TRACING: CPT | Performed by: INTERNAL MEDICINE

## 2020-12-16 NOTE — H&P
[facility]   Electrophysiology         No chief complaint on file. HPI: Sunita Walker is a 80 y.o. has a history of CAD and diagnosed with aortic valve endocarditis during her work up for CVA. She was found to have a mass or vegetation on her aortic valve and it was suspected to be the source of the stroke. AVR was performed on 12/4/15 by Dr. Damon Rose. She was on ASA and Plavix after surgery. Her post-op complications were a pneumothorax and C. Diff. Recently while in cardiac rehab, she was noted to have episodes of PAF. EKG was done on 9/26/16 which confirmed Atrial fib. She was started on Xarelto by Dr. Dat Berry after A fib was diagnosed but she states it is too expensive    She had a microperforation of esophagus during a MARIN due to presence of hiatal hernia. She was treated medically. Admitted to [facility] 11/27 for Chest pressure and palpitations,found to be in Afib w/'s. Tachy cardia induced heart failure. Received IV amiodorone then transitioned to oral amiodorone. Discharged home on 11/29. Amiodorone 200mg bid, Lasix 40 mg daily for five days. Becki Mcqueen presents today in follow up to recent hospitalization. Complains of lower extremity edema. SOB has improved. Past Medical History:   Diagnosis Date    Aortic valve disease     CAD (coronary artery disease)     CVA (cerebral infarction) 10/2015    Hypertension         Past Surgical History:   Procedure Laterality Date    AORTIC VALVE REPLACEMENT  12/4/2015    AVR porcine by DR. Lao [facility] CATHETERIZATION  11/19/2015    Dr. Anna Wilson - no flow restrictive stenoses in coronary arteries    CATARACT REMOVAL WITH IMPLANT Left 10/9/2015    COLONOSCOPY N/A 5/8/2020    COLONOSCOPY POLYPECTOMY SNARE/COLD BIOPSY performed by Aishwarya Webster MD at [facility]  12/22/2004    TOTAL KNEE ARTHROPLASTY Right 7/15/2013    UPPER GASTROINTESTINAL ENDOSCOPY N/A 5/8/2020 EGD BIOPSY performed by Tenzin López MD at [facility]       Allergies: Allergies   Allergen Reactions    Lipitor Hives    Penicillins        Social History:   reports that she has never smoked. She has never used smokeless tobacco. She reports that she does not drink alcohol or use drugs. Family History:  family history includes Heart Disease in her father. Reviewed. Denies family history of sudden cardiac death, arrhythmia, premature CAD    Review of System:  All other systems reviewed and are negative except for that noted above. Pertinent negatives are:   General: negative for fever, chills   Ophthalmic ROS: negative for - eye pain or loss of vision  ENT ROS: negative for - headaches, sore throat   Respiratory: negative for - cough, sputum  Cardiovascular: Reviewed in HPI  Gastrointestinal: negative for - abdominal pain, diarrhea, N/V  Hematology: negative for - bleeding, blood clots, bruising or jaundice  Genito-Urinary:  negative for - Dysuria or incontinence  Musculoskeletal: negative for - Joint swelling, muscle pain  Neurological: negative for - confusion, dizziness, headaches   Psychiatric: No anxiety, no depression. Dermatological: negative for - rash    Physical Examination:  Vitals:    12/02/20 1252   BP: 136/86   Pulse: 105   SpO2: 99%      Wt Readings from Last 3 Encounters:   12/02/20 177 lb 6.4 oz (80.5 kg)   11/25/20 183 lb 12.8 oz (83.4 kg)   10/29/20 177 lb 11.2 oz (80.6 kg)       · Constitutional: Oriented. No distress. · Head: Normocephalic and atraumatic. · Mouth/Throat: Oropharynx is clear and moist.   · Eyes: Conjunctivae normal. EOM are normal.   · Neck: Neck supple. No rigidity. No JVD present. · Cardiovascular: Normal rate, irregular rhythm, S1&S2. · Pulmonary/Chest: Bilateral respiratory sounds. No wheezes, No rhonchi. · Abdominal: Soft. Bowel sounds present. No distension, No tenderness. · Musculoskeletal: No tenderness.  No edema · Lymphadenopathy: Has no cervical adenopathy. · Neurological: Alert and oriented. Cranial nerve appears intact, No Gross deficit   · Skin: Skin is warm and dry. No rash noted. · Psychiatric: Has a normal behavior       Labs, diagnostic and imaging results reviewed. Reviewed. Lab Results   Component Value Date    CREATININE 0.72 2020    CREATININE 0.6 10/01/2020    AST 26 10/01/2020    ALT 18 10/01/2020       EC20 atrial fibrillation. [facility] 2020  Cath with 2 plus AI,no significant AV gradient. LVEDP 25 c/w high output CHF  EF 60  40% left main  40% in stent LAD,40% ostial CFX  30% RCA  Imp- high output CHF due to symptomatic anemia       ECHO 19  Normal left ventricle size, wall thickness and systolic function with an   estimated ejection fraction of 60%. No regional wall motion abnormalities   are seen.   E/e\"= 12. Diastolic filling parameters suggests grade I diastolic   dysfunction.   Mild to moderate mitral regurgitation   The left atrium is mildly dilated.   A bioprosthetic aortic valve (21mm Mosaic Ultra) appears well seated with a   maximum gradient of 27 mmHg and a mean gradient of 17 mmHg. Aortic valve   area of 1.4cm2. Mild aortic regurgitation.   Mild tricuspid regurgitation. PASP 24mmHg.   IVC size is normal (<2.1cm) and collapses > 50% with respiration consistent   with normal RA pressure (3mmHg).     Medication:  Current Outpatient Medications   Medication Sig Dispense Refill    furosemide (LASIX) 20 MG tablet Take 1 tablet by mouth daily 90 tablet 1    potassium chloride (KLOR-CON M) 20 MEQ extended release tablet Take 1 tablet by mouth daily 90 tablet 3    amiodarone (CORDARONE) 200 MG tablet Take 1 tablet by mouth 3 times daily (Patient taking differently: Take 200 mg by mouth 2 times daily ) 90 tablet 3    rivaroxaban (XARELTO) 15 MG TABS tablet Take 1 tablet by mouth daily (with breakfast) 90 tablet 3  ferrous sulfate (IRON 325) 325 (65 Fe) MG tablet Take 1 tablet by mouth 2 times daily (Patient taking differently: Take 325 mg by mouth daily (with breakfast) ) 60 tablet 5    pantoprazole (PROTONIX) 40 MG tablet Take 1 tablet by mouth every morning (before breakfast) (Patient taking differently: Take 40 mg by mouth Takes just as needed.) 30 tablet 5    Coenzyme Q10 (COQ-10 PO) Take by mouth      therapeutic multivitamin-minerals (THERAGRAN-M) tablet Take 1 tablet by mouth daily. No current facility-administered medications for this visit. Assessment and plan:     PAF  -ECG 12/02/20  Atrial fibrillation  -Afib noted as starting on 11/14/2020 and has continued  Sisi Gonzalez presents to the office in follow up. Pt admitted to [facility] 11/27 for Chest pressure and palpitations,found to be in Afib w/'s. Tachy cardia induced heart failure. Received IV amiodorone then transitioned to oral amiodorone. Discharged home on 11/29. Amiodorone 200mg bid, and Lasix 40 -S/p MARIN/DCCV 10/1/2020 with possible esophageal perforation.  -Xarelto 15 mg   -amiodarone 200mg BID  I would continue amiodarone for the next 2 weeks and cardioversion after that. -DCCV in two weeks. I  Have discussed with patient side effects of amiodarone including but not limited to thyroid disease, discoloration of skin and cornea and pulmonary fibrosis. Patient would like to continue with it. Shortness of breath and edema  -She has correlated this with her onset of atrial fibrillation  - has improved since last visit. Today she has no more edema  We will continue Lasix 20 mg daily plus potassium supplementation. We will do an echocardiogram hopefully next week to assess for LV function.       HTN  Vitals:    12/02/20 1252   BP: 136/86   Pulse: 105   SpO2: 99%   -Well controlled today  -Home BP monitoring encourage with a BP goal <130/80    CAD  -followed by   -Denmarkus angina       AVR -12/4/15 AVR mosaic porcine by   -On baby ASA    Plan    DCCV

## 2020-12-16 NOTE — PROCEDURES
[facility]     Electrophysiology Procedure Note       Date of Procedure: 12/16/2020  Patient's Name: Jarad Swain  YOB: 1935   Medical Record Number: [id number]  Procedure Performed by: David Samuel MD    Procedures performed:  IV sedation. External Electrical cardioversion   [id number]  ASA 3    Indication of the procedure: Persistent atrial fibrillation      Details of procedure: The patient was brought to the cath lab area in a fasting and non-sedated state. The risks, benefits and alternatives of the procedure were discussed with the patient. The patient opted to proceed with the procedure. Written informed consent was signed and placed in the chart. A timeout protocol was completed to identify the patient and the procedure being performed. I pushed 35 mg Brevital.   We monitored the patient's level of consciousness and vital signs/physiologic status throughout the procedure duration (see start and stop times below). Sedation:    sedation start: 1056  Sedation stop: 1116     Patient is on chronic anticoagulation therapy. Then we used Brevital for sedation and electrical DC cardioversion was perfomred using 200J, synchronized shock. Patient was converted to sinus rhythm at 69 bpm. The patient tolerated the procedure well and there were no complications. Conclusion:   Successful external DC cardioversion of atrial fibrillation    Plan:   The patient can be discharged if remains stable. Will continue with medical therapy. Continue amiodarone 200 mg qd until f/u in April.

## 2021-01-02 PROCEDURE — G2066 INTER DEVC REMOTE 30D: HCPCS | Performed by: INTERNAL MEDICINE

## 2021-01-02 PROCEDURE — 93298 REM INTERROG DEV EVAL SCRMS: CPT | Performed by: INTERNAL MEDICINE

## 2021-01-04 ENCOUNTER — NURSE ONLY (OUTPATIENT)
Dept: CARDIOLOGY CLINIC | Age: 86
End: 2021-01-04
Payer: MEDICARE

## 2021-01-04 DIAGNOSIS — I48.91 ATRIAL FIBRILLATION WITH RAPID VENTRICULAR RESPONSE (HCC): ICD-10-CM

## 2021-01-04 DIAGNOSIS — Z45.09 ENCOUNTER FOR ELECTRONIC ANALYSIS OF REVEAL EVENT RECORDER: ICD-10-CM

## 2021-01-04 NOTE — PROGRESS NOTES
We received a remote transmission from patient's monitor at home. Remote Linq report shows AF. Pt remains on Xarelto. EP physician to review. We will continue to monitor remotely.

## 2021-02-06 PROCEDURE — G2066 INTER DEVC REMOTE 30D: HCPCS | Performed by: INTERNAL MEDICINE

## 2021-02-06 PROCEDURE — 93298 REM INTERROG DEV EVAL SCRMS: CPT | Performed by: INTERNAL MEDICINE

## 2021-02-08 ENCOUNTER — NURSE ONLY (OUTPATIENT)
Dept: CARDIOLOGY CLINIC | Age: 86
End: 2021-02-08
Payer: MEDICARE

## 2021-02-08 DIAGNOSIS — Z45.09 ENCOUNTER FOR ELECTRONIC ANALYSIS OF REVEAL EVENT RECORDER: ICD-10-CM

## 2021-02-08 DIAGNOSIS — I48.91 ATRIAL FIBRILLATION WITH RAPID VENTRICULAR RESPONSE (HCC): ICD-10-CM

## 2021-02-08 NOTE — LETTER
[address] [phone]  [address] [facility] [phone]    Pacemaker/Defibrillator Clinic          02/08/21        [address] [facility] [address] [facility] [address]        Dear Nathan Eid    This letter is to inform you that we received the transmission from your monitor at home that checks your implanted heart device. The next date your monitor will automatically transmit will be 3-15-21. If your report needs attention we will notify you. Your device and monitor are wireless and most transmit cellularly, but please periodically check your monitor is still plugged in to the electrical outlet. If you still use the telephone land line to send please ensure the connection to the phone missy is secure. This will help to ensure successful automatic transmissions in the future. Also, the monitor needs to be close to you while sleeping at night. Please be aware that the remote device transmission sites are periodically monitored only during regular business hours during which simultaneous in-office device clinics are being run. If your transmission requires attention, we will contact you as soon as possible. Thank you.             [address]

## 2021-03-15 ENCOUNTER — NURSE ONLY (OUTPATIENT)
Dept: CARDIOLOGY CLINIC | Age: 86
End: 2021-03-15
Payer: MEDICARE

## 2021-03-15 DIAGNOSIS — Z45.09 ENCOUNTER FOR ELECTRONIC ANALYSIS OF REVEAL EVENT RECORDER: ICD-10-CM

## 2021-03-15 DIAGNOSIS — I48.91 ATRIAL FIBRILLATION WITH RAPID VENTRICULAR RESPONSE (HCC): ICD-10-CM

## 2021-03-15 PROCEDURE — G2066 INTER DEVC REMOTE 30D: HCPCS | Performed by: INTERNAL MEDICINE

## 2021-03-15 PROCEDURE — 93298 REM INTERROG DEV EVAL SCRMS: CPT | Performed by: INTERNAL MEDICINE

## 2021-03-15 NOTE — LETTER
[address] [facility] [phone]  [address] [facility] [phone]    Pacemaker/Defibrillator Clinic          03/16/21        Navin Eid  [address] [facility] [address] [place]        Dear Navin Eid    This letter is to inform you that we received the transmission from your monitor at home that checks your implanted heart device. The next date your monitor will automatically transmit will be 5-3-21. If your report needs attention we will notify you. Your device and monitor are wireless and most transmit cellularly, but please periodically check your monitor is still plugged in to the electrical outlet. If you still use the telephone land line to send please ensure the connection to the phone missy is secure. This will help to ensure successful automatic transmissions in the future. Also, the monitor needs to be close to you while sleeping at night. Please be aware that the remote device transmission sites are periodically monitored only during regular business hours during which simultaneous in-office device clinics are being run. If your transmission requires attention, we will contact you as soon as possible. Thank you.             [address]

## 2021-03-24 ENCOUNTER — IMPORTED ENCOUNTER (OUTPATIENT)
Dept: URBAN - METROPOLITAN AREA CLINIC 31 | Facility: CLINIC | Age: 86
End: 2021-03-24

## 2021-03-24 PROBLEM — H35.3131: Noted: 2021-03-24

## 2021-03-24 PROBLEM — H02.831: Noted: 2021-03-24

## 2021-03-24 PROBLEM — H43.813: Noted: 2021-03-24

## 2021-03-24 PROBLEM — H02.834: Noted: 2021-03-24

## 2021-03-24 PROBLEM — Z96.1: Noted: 2021-03-24

## 2021-03-24 PROCEDURE — 92250 FUNDUS PHOTOGRAPHY W/I&R: CPT

## 2021-03-24 PROCEDURE — 92015 DETERMINE REFRACTIVE STATE: CPT

## 2021-03-24 PROCEDURE — 92004 COMPRE OPH EXAM NEW PT 1/>: CPT

## 2021-03-24 NOTE — PATIENT DISCUSSION
1.  ARMD OU dry - OD>OS--Importance of smoking cessation blood pressure control and healthy diet were emphasized. In accordance with the AREDS study a good multivitamin containing EC and Zinc were recommened to be taken daily. Patient was instructed to self monitor their monocular vision (reading/Amsler Grid) at least weekly. Patient should immediately report any new onset of decreased vision or metamorphopsia. 2. Pseudophakia OU - IOLs stable. OD capsule clear OS PCO 2. Pt wants to wait until they come back in fall for Yag-.3. PVD OU:  Patient was cautioned to call our office immediately if they experience a substantial change in their symptoms such as an increase in floaters persistent flashes loss of visual field (may appear as a shadow or a curtain) or decrease in visual acuity as these may indicate a retinal tear or detachment. 4.  Dermatochalasis OU:  Patient currently asymptomatic. Observe. 5. Rx change OD--pt wants to wait on rx change until after Yag OS in fall. 6.   RTN 7 mths CE--eval OS capsule

## 2021-04-27 NOTE — PROGRESS NOTES
We received a remote transmission from patient's monitor at home. Remote Linq report shows no arrhythmias. EP physician to review.  We will continue to monitor remotely.

## 2021-04-29 ENCOUNTER — OFFICE VISIT (OUTPATIENT)
Dept: CARDIOLOGY CLINIC | Age: 86
End: 2021-04-29
Payer: MEDICARE

## 2021-04-29 VITALS
OXYGEN SATURATION: 98 % | SYSTOLIC BLOOD PRESSURE: 172 MMHG | BODY MASS INDEX: 32.25 KG/M2 | HEART RATE: 82 BPM | DIASTOLIC BLOOD PRESSURE: 94 MMHG | RESPIRATION RATE: 18 BRPM | WEIGHT: 182 LBS | HEIGHT: 63 IN

## 2021-04-29 DIAGNOSIS — I35.0 NONRHEUMATIC AORTIC VALVE STENOSIS: Chronic | ICD-10-CM

## 2021-04-29 DIAGNOSIS — D64.9 ANEMIA, UNSPECIFIED TYPE: ICD-10-CM

## 2021-04-29 DIAGNOSIS — I10 ESSENTIAL HYPERTENSION: ICD-10-CM

## 2021-04-29 DIAGNOSIS — E78.5 HYPERLIPIDEMIA, UNSPECIFIED HYPERLIPIDEMIA TYPE: ICD-10-CM

## 2021-04-29 DIAGNOSIS — R06.02 SOB (SHORTNESS OF BREATH): ICD-10-CM

## 2021-04-29 DIAGNOSIS — I25.10 CORONARY ARTERY DISEASE INVOLVING NATIVE CORONARY ARTERY OF NATIVE HEART WITHOUT ANGINA PECTORIS: ICD-10-CM

## 2021-04-29 DIAGNOSIS — Z95.2 S/P AVR (AORTIC VALVE REPLACEMENT): Chronic | ICD-10-CM

## 2021-04-29 DIAGNOSIS — I48.0 PAF (PAROXYSMAL ATRIAL FIBRILLATION) (HCC): Primary | ICD-10-CM

## 2021-04-29 PROCEDURE — 93000 ELECTROCARDIOGRAM COMPLETE: CPT | Performed by: INTERNAL MEDICINE

## 2021-04-29 PROCEDURE — 99214 OFFICE O/P EST MOD 30 MIN: CPT | Performed by: INTERNAL MEDICINE

## 2021-04-29 RX ORDER — FUROSEMIDE 20 MG/1
20 TABLET ORAL DAILY
Qty: 90 TABLET | Refills: 3 | Status: SHIPPED | OUTPATIENT
Start: 2021-04-29 | End: 2022-06-28 | Stop reason: DRUGHIGH

## 2021-04-29 RX ORDER — AMLODIPINE BESYLATE 5 MG/1
5 TABLET ORAL DAILY
Qty: 90 TABLET | Refills: 3 | Status: SHIPPED | OUTPATIENT
Start: 2021-04-29 | End: 2022-06-28 | Stop reason: SDUPTHER

## 2021-04-29 NOTE — PROGRESS NOTES
[facility]   Cardiac Follow up    Referring Provider:  Farrah Schwartz DO   Chief Complaint   Patient presents with    6 Month Follow-Up    Coronary Artery Disease    Hypertension    Hyperlipidemia     History of Present Illness:   Tyra Davis has a history of porcine aortic valve replacement and paroxysmal a fib. She has had a CVA. Ms Sierra Winn has had 2 strokes prior to last visit; she states she was at [facility]. She did inpatient rehab at [facility] in August, 2019. Dr. Michael Mercado implanted a ILR 8/9/19. She was admitted May 2020 with chest tightness and dyspnea. LHC showed high output CHF due to symptomatic anemia. Taken off Plavix and had GI consult with EGD/colonoscopy. Recently admitted with recurrent a. Fib. Underwent MARIN and cardioversion. MARIN complicated by esophageal tear that healed with conservative care    She states her BP has been elevated at  home, usually around 150-160. She has recently gained some weight. . She is not very active, does not smoke. She and her  want to fly to [place]  In the fall. Past Medical History:   has a past medical history of Aortic valve disease, CAD (coronary artery disease), CVA (cerebral infarction), and Hypertension. Surgical History:   has a past surgical history that includes Coronary angioplasty with stent (12/22/2004); Total knee arthroplasty (Right, 7/15/2013); Cataract removal with implant (Left, 10/9/2015); Cardiac catheterization (11/19/2015); Aortic valve replacement (12/4/2015); Upper gastrointestinal endoscopy (N/A, 5/8/2020); and Colonoscopy (N/A, 5/8/2020). Social History:   reports that she has never smoked. She has never used smokeless tobacco. She reports that she does not drink alcohol or use drugs. Family History:  family history includes Heart Disease in her father. Home Medications:  Prior to Admission medications    Medication Sig Start Date End Date Taking?  Authorizing Provider   furosemide (LASIX) 20 MG tablet Take 1 tablet by mouth daily 12/2/20  Yes Marylou Lindsey MD   amiodarone (CORDARONE) 200 MG tablet Take 1 tablet by mouth 3 times daily  Patient taking differently: Take 200 mg by mouth daily  11/25/20  Yes Marylou Lindsey MD   rivaroxaban (XARELTO) 15 MG TABS tablet Take 1 tablet by mouth daily (with breakfast) 9/22/20  Yes Galen Esquivel MD   pantoprazole (PROTONIX) 40 MG tablet Take 1 tablet by mouth every morning (before breakfast)  Patient taking differently: Take 40 mg by mouth Takes just as needed. 5/8/20  Yes Galen Esquivel MD   Coenzyme Q10 (COQ-10 PO) Take by mouth   Yes Historical Provider, MD      Allergies:  Lipitor and Penicillins     Review of Systems:   · Constitutional: there has been no unanticipated weight loss. There's been no change in energy level, sleep pattern, or activity level. · Eyes: No visual changes or diplopia. No scleral icterus. · ENT: No Headaches, hearing loss or vertigo. No mouth sores or sore throat. · Cardiovascular: Reviewed in HPI  · Respiratory: No cough or wheezing, no sputum production. No hematemesis. · Gastrointestinal: No abdominal pain, appetite loss, blood in stools. No change in bowel or bladder habits. · Genitourinary: No dysuria, trouble voiding, or hematuria. · Musculoskeletal:  No gait disturbance, weakness or joint complaints. · Integumentary: No rash or pruritis. · Neurological: No headache, diplopia, change in muscle strength, numbness or tingling. No change in gait, balance, coordination, mood, affect, memory, mentation, behavior. · Psychiatric: No anxiety, no depression. · Endocrine: No malaise, fatigue or temperature intolerance. No excessive thirst, fluid intake, or urination. No tremor. · Hematologic/Lymphatic: No abnormal bruising or bleeding, blood clots or swollen lymph nodes. · Allergic/Immunologic: No nasal congestion or hives. Physical Examination:    Blood pressure (!) 172/94, pulse 82, resp.  rate 18, height 5' 3\" (1.6 m), weight 182 lb (82.6 kg), SpO2 98 %,     Constitutional and General Appearance: alert,oriented, no acute distress  Skin:good turgor,intact without lesions  HEENT: EOMI ,normal  Neck:no JVD    Respiratory:  · Normal excursion and expansion without use of accessory muscles  · Resp Auscultation: Normal breath sounds without dullness  Cardiovascular:  · The apical impulses not displaced  · II/IV JIGAR, Diastolic Murmur  - JIGAR seems later peaking   · Cervical veins are not engorged  · The carotid upstroke is normal in amplitude and contour without delay or bruit  · Peripheral pulses are symmetrical and full  · There is no clubbing, cyanosis of the extremities. · No edema  · Femoral Arteries: 2+ and equal  · Pedal Pulses: 2+ and equal   Abdomen:  · No masses or tenderness  · Liver/Spleen: No Abnormalities Noted  Neurological/Psychiatric:  · Alert and oriented in all spheres  · Moves all extremities well  · Exhibits normal gait balance and coordination  · No abnormalities of mood, affect, memory, mentation, or behavior are noted    EGD 5/8/2020 (Dr. Naeem Rm)  Large hiatal hernia, few gastric erosions, bx for H. Pylori    Colonoscopy 5/8/2020 (Dr. Naeem Rm)  Small rectal polyp removed,sigmoid diverticulosis, small hemorrhoids    C 5/7/2020  Cath with 2 plus AI,no significant AV gradient. LVEDP 25 c/w high output CHF  EF 60  40% left main  40% in stent LAD,40% ostial CFX  30% RCA     Imp- high output CHF due to symptomatic anemia     Will give 1 unit of PRBC,IV iron  GI evaluation     ECHO 5/31/19  Normal left ventricle size, wall thickness and systolic function with an   estimated ejection fraction of 60%.  No regional wall motion abnormalities   are seen.   E/e\"= 12. Diastolic filling parameters suggests grade I diastolic   dysfunction.   Mild to moderate mitral regurgitation   The left atrium is mildly dilated.   A bioprosthetic aortic valve (21mm Mosaic Ultra) appears well seated with a   maximum gradient of 27 mmHg and a mean gradient of 17 mmHg. Aortic valve   area of 1.4cm2. Mild aortic regurgitation.   Mild tricuspid regurgitation. PASP 24mmHg.   IVC size is normal (<2.1cm) and collapses > 50% with respiration consistent   with normal RA pressure (3mmHg).    Assessment:    CAD  No anginal symptoms. -[facility] 2004> PCI of proximal LAD  -[facility] 2013> Cath with LM normal, LAD mid 30%, distal 40%, D1-40%, D4-50%(small), widely patent proximal stent, Cx Ostial 20%. RCA Mid 30% X2 and LVG 60%. Medically managed  -[facility] 2015> No flow-significant coronary stenoses are identified. The left anterior descending stent is widely patent. -[facility] 5/7/20> 2+ AI, EF 60, 40% LM, 40% in-stent LAD,40% ostial CFX, 30% RCA, Imp- high output CHF due to symptomatic anemia     Aortic Valve Replacement   12/4/15 AVR Mosaic porcine performed by Dr. Juan Bae. Taking baby asa    Hypertension  Will continue to monitor   Blood pressure (!) 172/94, pulse 82, resp. rate 18, height 5' 3\" (1.6 m), weight 182 lb (82.6 kg), SpO2 98 %,     Atrial fibrillation, history of  Has ILR now implanted by Dr Steven Samayoa. Recent check revealed no afib     Detected while exercising during cardiac rehab  High dose Xarelto gave her bruising. Takes baby asa. EKG 9/26/16> A. fib  EKG 7/3/18> Sinus rhythm - a fib resolved. EKG 5/7/20> NSR 78  On xarelto 15mg   Cardioversion 12/16/2020    Sinus rhythm 4/29/21    Hyperlipidemia  Taking Crestor 5mg    She states she had hives from Lipitor. Anemia, history of   10/1/2020> H&H 14.6/44.5  6/17/2020> H&H 12.2/37/7  5/19/20> H&H 10.9/35.2  5/7/20> H&H 8.3/27.0  11/2/20> H&H 13.5/40.6            Plan:   Tari Lundy has a stable cardiac status. All cardiac test and lab results were personally reviewed by me during this office visit. 1.  Echo and Labs  soon   2. Start Norvac 5mg   3. Follow up in 3 months       I appreciate the opportunity of cooperating in the care of this individual.    Anna Chavira.  Reese Garcia M.D., [address] [facility]    Patient's problem list, medications, allergies, past medical, surgical, social and family histories were reviewed and updated as appropriate. Scribe's attestation: This note was scribed in the presence of Dr. Anu Stringer MD, by Sandro Ricardo RN. The scribe's documentation has been prepared under my direction and personally reviewed by me in its entirety. I confirm that the note above accurately reflects all work, treatment, procedures, and medical decision making performed by me.

## 2021-04-30 ENCOUNTER — PROCEDURE VISIT (OUTPATIENT)
Dept: CARDIOLOGY CLINIC | Age: 86
End: 2021-04-30
Payer: MEDICARE

## 2021-04-30 DIAGNOSIS — Z95.2 S/P AVR (AORTIC VALVE REPLACEMENT): Chronic | ICD-10-CM

## 2021-04-30 DIAGNOSIS — I35.0 NONRHEUMATIC AORTIC VALVE STENOSIS: Chronic | ICD-10-CM

## 2021-04-30 LAB
LV EF: 65 %
LVEF MODALITY: NORMAL

## 2021-04-30 PROCEDURE — 93306 TTE W/DOPPLER COMPLETE: CPT | Performed by: INTERNAL MEDICINE

## 2021-05-03 ENCOUNTER — NURSE ONLY (OUTPATIENT)
Dept: CARDIOLOGY CLINIC | Age: 86
End: 2021-05-03
Payer: MEDICARE

## 2021-05-03 DIAGNOSIS — I48.91 ATRIAL FIBRILLATION WITH RAPID VENTRICULAR RESPONSE (HCC): ICD-10-CM

## 2021-05-03 DIAGNOSIS — Z45.09 ENCOUNTER FOR ELECTRONIC ANALYSIS OF REVEAL EVENT RECORDER: ICD-10-CM

## 2021-05-03 LAB
ALBUMIN SERPL-MCNC: 4.5 G/DL (ref 3.5–5)
ANION GAP SERPL CALCULATED.3IONS-SCNC: 8 MMOL/L (ref 6–18)
BASOPHILS ABSOLUTE: 0 THOU/MCL (ref 0–0.2)
BASOPHILS ABSOLUTE: 1 %
BUN BLDV-MCNC: 23 MG/DL (ref 8–26)
CALCIUM SERPL-MCNC: 9.5 MG/DL (ref 8.8–10.1)
CHLORIDE BLD-SCNC: 103 MEQ/L (ref 98–111)
CO2: 29 MMOL/L (ref 21–31)
CREAT SERPL-MCNC: 0.71 MG/DL (ref 0.44–1.03)
EOSINOPHILS ABSOLUTE: 0.1 THOU/MCL (ref 0.03–0.45)
EOSINOPHILS RELATIVE PERCENT: 1 %
GFR AFRICAN AMERICAN: 87 ML/MIN/1.73 M2
GFR NON-AFRICAN AMERICAN: 76 ML/MIN/1.73 M2
GLUCOSE BLD-MCNC: 90 MG/DL (ref 70–99)
HCT VFR BLD CALC: 39.7 % (ref 36–46)
HEMOGLOBIN: 13.1 G/DL (ref 12–15.2)
LYMPHOCYTES ABSOLUTE: 1 THOU/MCL (ref 1–4)
LYMPHOCYTES RELATIVE PERCENT: 21 %
MCH RBC QN AUTO: 31.1 PG (ref 27–33)
MCHC RBC AUTO-ENTMCNC: 33.1 G/DL (ref 32–36)
MCV RBC AUTO: 94 FL (ref 82–97)
MONOCYTES # BLD: 10 %
MONOCYTES ABSOLUTE: 0.5 THOU/MCL (ref 0.2–0.9)
NEUTROPHILS ABSOLUTE: 3.3 THOU/MCL (ref 1.8–7.7)
PDW BLD-RTO: 15.6 %
PHOSPHORUS: 3.8 MG/DL (ref 2.5–4.5)
PLATELET # BLD: 170 THOU/MCL (ref 140–375)
PMV BLD AUTO: 10.7 FL (ref 7.4–11.5)
POTASSIUM SERPL-SCNC: 4.2 MEQ/L (ref 3.6–5.1)
RBC # BLD: 4.22 MIL/MCL (ref 3.8–5.2)
SEG NEUTROPHILS: 67 %
SODIUM BLD-SCNC: 140 MEQ/L (ref 135–145)
T4 FREE: 1.22 NG/DL (ref 0.93–1.7)
TSH ULTRASENSITIVE: 5.06 (ref 0.27–4.2)
WBC # BLD: 4.9 THOU/MCL (ref 3.6–10.5)

## 2021-05-03 PROCEDURE — G2066 INTER DEVC REMOTE 30D: HCPCS | Performed by: INTERNAL MEDICINE

## 2021-05-03 PROCEDURE — 93298 REM INTERROG DEV EVAL SCRMS: CPT | Performed by: INTERNAL MEDICINE

## 2021-05-06 ENCOUNTER — OFFICE VISIT (OUTPATIENT)
Dept: CARDIOLOGY CLINIC | Age: 86
End: 2021-05-06
Payer: MEDICARE

## 2021-05-06 ENCOUNTER — NURSE ONLY (OUTPATIENT)
Dept: CARDIOLOGY CLINIC | Age: 86
End: 2021-05-06

## 2021-05-06 VITALS
OXYGEN SATURATION: 99 % | HEART RATE: 54 BPM | SYSTOLIC BLOOD PRESSURE: 172 MMHG | BODY MASS INDEX: 32.43 KG/M2 | HEIGHT: 63 IN | WEIGHT: 183 LBS | DIASTOLIC BLOOD PRESSURE: 80 MMHG

## 2021-05-06 DIAGNOSIS — I48.0 PAF (PAROXYSMAL ATRIAL FIBRILLATION) (HCC): Primary | ICD-10-CM

## 2021-05-06 DIAGNOSIS — I25.10 CORONARY ARTERY DISEASE INVOLVING NATIVE CORONARY ARTERY OF NATIVE HEART WITHOUT ANGINA PECTORIS: ICD-10-CM

## 2021-05-06 DIAGNOSIS — I10 BENIGN ESSENTIAL HTN: ICD-10-CM

## 2021-05-06 DIAGNOSIS — Z95.2 S/P AVR: ICD-10-CM

## 2021-05-06 DIAGNOSIS — R06.02 SOB (SHORTNESS OF BREATH): ICD-10-CM

## 2021-05-06 DIAGNOSIS — Z79.899 LONG TERM CURRENT USE OF AMIODARONE: ICD-10-CM

## 2021-05-06 PROCEDURE — 99214 OFFICE O/P EST MOD 30 MIN: CPT | Performed by: INTERNAL MEDICINE

## 2021-05-06 NOTE — PROGRESS NOTES
Patient comes in for interrogation of their implanted loop recorder. Interrogation shows AF with a 12.9% burden. No episodes noted since [facility] on 12/16/2020. Implanted for Stroke. Patient remains on Xarelto and amiodarone. Today we changed AF to All. Please see interrogation for more detail. Patient will see Dr. Gisela Givens today and we will continue to follow the Patient remotely.

## 2021-05-06 NOTE — PROGRESS NOTES
[facility]   Electrophysiology Follow Up  Date: 5/6/2021      Chief Complaint   Patient presents with    6 Month Follow-Up     no cardiac complaints at this time    Atrial Fibrillation        HPI: Lupe Callejas is a 80 y.o. has a history of CAD and diagnosed with aortic valve endocarditis during her work up for CVA. She was found to have a mass or vegetation on her aortic valve and it was suspected to be the source of the stroke. AVR was performed on 12/4/15 by Dr. Sally Hurt. She was on ASA and Plavix after surgery. Her post-op complications were a pneumothorax and C. Diff. Recently while in cardiac rehab, she was noted to have episodes of PAF. EKG was done on 9/26/16 which confirmed Atrial fib. She was started on Xarelto by Dr. Minh Zacarias after A fib was diagnosed but she states it is too expensive    She had a microperforation of esophagus during a MARIN due to presence of hiatal hernia. She was treated medically. Admitted to [facility] 11/27 for Chest pressure and palpitations,found to be in Afib w/'s. Tachy cardia induced heart failure. Received IV amiodorone then transitioned to oral amiodorone. Discharged home on 11/29. Amiodorone 200mg bid, Lasix 40 mg daily for five days. S/p [facility] 12/16/2020    Leslie Dominguez presents to the office in follow up. She states she is feeling well from a cardiac standpoint. No recurrence of afib since her DCCV 12/2020. Her blood pressure is elevated but she states she has white-coat HTN. She recently re-started her Amlodipine 5 mg daily. Taking her amiodarone 200 mg 4 times a week. Past Medical History:   Diagnosis Date    Aortic valve disease     CAD (coronary artery disease)     CVA (cerebral infarction) 10/2015    Hypertension         Past Surgical History:   Procedure Laterality Date    AORTIC VALVE REPLACEMENT  12/4/2015    AVR porcine by   [address] CATHETERIZATION  11/19/2015    Dr. Karyle Mano - no flow restrictive stenoses in coronary arteries    CATARACT REMOVAL WITH IMPLANT Left 10/9/2015    COLONOSCOPY N/A 5/8/2020    COLONOSCOPY POLYPECTOMY SNARE/COLD BIOPSY performed by Rajni Owen MD at [facility]  12/22/2004    TOTAL KNEE ARTHROPLASTY Right 7/15/2013    UPPER GASTROINTESTINAL ENDOSCOPY N/A 5/8/2020    EGD BIOPSY performed by Rajni Owen MD at [address] [place] [address]       Allergies: Allergies   Allergen Reactions    Lipitor Hives    Penicillins        Social History:   reports that she has never smoked. She has never used smokeless tobacco. She reports that she does not drink alcohol or use drugs. Family History:  family history includes Heart Disease in her father. Reviewed. Denies family history of sudden cardiac death, arrhythmia, premature CAD    Review of System:  All other systems reviewed and are negative except for that noted above. Pertinent negatives are:   General: negative for fever, chills   Ophthalmic ROS: negative for - eye pain or loss of vision  ENT ROS: negative for - headaches, sore throat   Respiratory: negative for - cough, sputum  Cardiovascular: Reviewed in HPI  Gastrointestinal: negative for - abdominal pain, diarrhea, N/V  Hematology: negative for - bleeding, blood clots, bruising or jaundice  Genito-Urinary:  negative for - Dysuria or incontinence  Musculoskeletal: negative for - Joint swelling, muscle pain  Neurological: negative for - confusion, dizziness, headaches   Psychiatric: No anxiety, no depression. Dermatological: negative for - rash    Physical Examination:  Vitals:    05/06/21 0951   BP: (!) 172/80   Pulse: 54   SpO2: 99%      Wt Readings from Last 3 Encounters:   05/06/21 183 lb (83 kg)   04/29/21 182 lb (82.6 kg)   12/16/20 177 lb (80.3 kg)       · Constitutional: Oriented. No distress. · Head: Normocephalic and atraumatic.    · Mouth/Throat: Oropharynx is clear and moist.   · Eyes: Conjunctivae normal. EOM are normal.   · Neck: atrial fibrillation. We will continue to monitor for side effects of amiodarone. The long-term plan will be to decrease the dose of amiodarone and see if she has recurrence or not. ILR   -The CIED was interrogated and programmed and I supervised and reviewed all the data. All findings and changes are in device interrogation sheet and reflect my personal interpretation and changes and is scanned to Epic.    -No afib since [facility] 12/16/2020        Shortness of breath and edema   -She has correlated this with her onset of atrial fibrillation   - has improved since last visit. HTN  -Not well controlled: 172/80  -Attests to white coat HTN, recently re-started Amlodipine 5 mg daily  -BP goal <130/80  -Home BP monitoring encouraged, printed information provided on how to accurately measure BP at home.  -Counseled to follow a low salt diet to assure blood pressure remains controlled for cardiovascular risk factor modification.   -The patient is counseled to get regular exercise 3-5 times per week and maintain a healthy weight reduce cardiovascular risk factors. CAD   -followed by    -Denies angina       AVR   -12/4/15 AVR mosaic porcine by    -On baby ASA  The max gradient has increased from 27 in 2019 to 45 millimeters mercury last month. She is following with Dr. Lola Saldana to decide about the next action since now she has severe stenosis by hemodynamic data. Plan  Continue Amiodarone 200 mg daily 5 days a week  F/u EP 9/2021    We will see what the plan is for her aortic valve. We will continue the amiodarone for the time being. I will consider cutting back the dose in the future and then see if she has any recurrence. In that case ablation may be considered. If she ever goes for open heart surgery and Maze procedure and DENA appendage ligation will be recommended. Thank you for allowing me to participate in the care of [address] [facility].   Further evaluation will be based upon the patient's clinical course and testing results. All questions and concerns were addressed to the patient/family. Alternatives to my treatment were discussed. I have discussed the above stated plan and the patient verbalized understanding and agreed with the plan. NOTE: This report was transcribed using voice recognition software. Every effort was made to ensure accuracy, however, inadvertent computerized transcription errors may be present. Nancy Somers MD, Ivone Arriaza [address] [facility]   Office: [phone]     Scribe attestation:  This note was scribed in the presence of Nancy Somers MD by Joy Dorsey RN    I, Dr. Nancy Somers personally performed the services described in this documentation as scribed by RN in my presence, and it is both accurate and complete.

## 2021-05-18 RX ORDER — PANTOPRAZOLE SODIUM 40 MG/1
40 TABLET, DELAYED RELEASE ORAL DAILY
Qty: 90 TABLET | Refills: 3 | Status: SHIPPED | OUTPATIENT
Start: 2021-05-18 | End: 2022-06-01

## 2021-06-07 ENCOUNTER — NURSE ONLY (OUTPATIENT)
Dept: CARDIOLOGY CLINIC | Age: 86
End: 2021-06-07
Payer: MEDICARE

## 2021-06-07 DIAGNOSIS — I63.9 CEREBROVASCULAR ACCIDENT (CVA), UNSPECIFIED MECHANISM (HCC): ICD-10-CM

## 2021-06-07 DIAGNOSIS — I48.0 PAF (PAROXYSMAL ATRIAL FIBRILLATION) (HCC): ICD-10-CM

## 2021-06-07 DIAGNOSIS — Z45.09 ENCOUNTER FOR ELECTRONIC ANALYSIS OF REVEAL EVENT RECORDER: ICD-10-CM

## 2021-06-08 NOTE — PROGRESS NOTES
We received a remote transmission from patient's monitor at home. Remote Linq report shows 1 AF recording on 05.14.2021, 3hrs 14mins with max V rate 162bpm. Hx DCCV 12.16.2020. Pt remains on Xarelto and Amiodarone. EP physician to review. We will continue to monitor remotely.

## 2021-06-12 PROCEDURE — 93298 REM INTERROG DEV EVAL SCRMS: CPT | Performed by: INTERNAL MEDICINE

## 2021-06-12 PROCEDURE — G2066 INTER DEVC REMOTE 30D: HCPCS | Performed by: INTERNAL MEDICINE

## 2021-07-10 PROCEDURE — G2066 INTER DEVC REMOTE 30D: HCPCS | Performed by: INTERNAL MEDICINE

## 2021-07-10 PROCEDURE — 93298 REM INTERROG DEV EVAL SCRMS: CPT | Performed by: INTERNAL MEDICINE

## 2021-07-12 ENCOUNTER — NURSE ONLY (OUTPATIENT)
Dept: CARDIOLOGY CLINIC | Age: 86
End: 2021-07-12

## 2021-07-12 DIAGNOSIS — Z45.09 ENCOUNTER FOR ELECTRONIC ANALYSIS OF REVEAL EVENT RECORDER: ICD-10-CM

## 2021-07-12 PROCEDURE — G2066 INTER DEVC REMOTE 30D: HCPCS | Performed by: INTERNAL MEDICINE

## 2021-07-12 PROCEDURE — 93298 REM INTERROG DEV EVAL SCRMS: CPT | Performed by: INTERNAL MEDICINE

## 2021-07-16 ENCOUNTER — OFFICE VISIT (OUTPATIENT)
Dept: CARDIOLOGY CLINIC | Age: 86
End: 2021-07-16
Payer: MEDICARE

## 2021-07-16 VITALS
BODY MASS INDEX: 32.96 KG/M2 | WEIGHT: 186 LBS | DIASTOLIC BLOOD PRESSURE: 80 MMHG | OXYGEN SATURATION: 99 % | SYSTOLIC BLOOD PRESSURE: 166 MMHG | HEART RATE: 80 BPM | HEIGHT: 63 IN

## 2021-07-16 DIAGNOSIS — I10 ESSENTIAL HYPERTENSION: ICD-10-CM

## 2021-07-16 DIAGNOSIS — E78.5 HYPERLIPIDEMIA, UNSPECIFIED HYPERLIPIDEMIA TYPE: ICD-10-CM

## 2021-07-16 DIAGNOSIS — I48.91 ATRIAL FIBRILLATION WITH RAPID VENTRICULAR RESPONSE (HCC): ICD-10-CM

## 2021-07-16 DIAGNOSIS — I35.0 NONRHEUMATIC AORTIC VALVE STENOSIS: ICD-10-CM

## 2021-07-16 DIAGNOSIS — I25.10 CORONARY ARTERY DISEASE INVOLVING NATIVE CORONARY ARTERY OF NATIVE HEART WITHOUT ANGINA PECTORIS: Primary | ICD-10-CM

## 2021-07-16 DIAGNOSIS — Z95.2 S/P AVR: ICD-10-CM

## 2021-07-16 PROCEDURE — 99214 OFFICE O/P EST MOD 30 MIN: CPT | Performed by: INTERNAL MEDICINE

## 2021-07-16 NOTE — PATIENT INSTRUCTIONS
Take amLODIPine (NORVASC) 5 MG tablet  Daily   Take amiodarone (CORDARONE) 200 MG tablet Daily   Follow up in 3 months w/ Echo

## 2021-07-16 NOTE — PROGRESS NOTES
[facility]   Cardiac Follow up    Referring Provider:  Elly Shelby DO   Chief Complaint   Patient presents with    Atrial Fibrillation    Coronary Artery Disease     History of Present Illness:   Dieudonne Womack has a history of porcine aortic valve replacement and paroxysmal a fib. She has had a CVA. Ms Halima Browne has had 2 strokes prior to last visit; she states she was at [facility]. She did inpatient rehab at [facility] in August, 2019. Dr. Brenda Haji implanted a ILR 8/9/19. She was admitted May 2020 with chest tightness and dyspnea. C showed high output CHF due to symptomatic anemia. Taken off Plavix and had GI consult with EGD/colonoscopy. Recently admitted with recurrent a. Fib. Underwent MARIN and cardioversion. MARIN complicated by esophageal tear that healed with conservative care    She stays busy around her home, does chores and cares for her . She states her BP is controlled at  home, usually around 140. Discussed possibly of valve replacement in the future- does not need at this time . She is not very active, does not smoke. She is planning to travel to [place] in the winter. Past Medical History:   has a past medical history of Aortic valve disease, CAD (coronary artery disease), CVA (cerebral infarction), and Hypertension. Surgical History:   has a past surgical history that includes Coronary angioplasty with stent (12/22/2004); Total knee arthroplasty (Right, 7/15/2013); Cataract removal with implant (Left, 10/9/2015); Cardiac catheterization (11/19/2015); Aortic valve replacement (12/4/2015); Upper gastrointestinal endoscopy (N/A, 5/8/2020); and Colonoscopy (N/A, 5/8/2020). Social History:   reports that she has never smoked. She has never used smokeless tobacco. She reports that she does not drink alcohol and does not use drugs. Family History:  family history includes Heart Disease in her father.      Home Medications:  Prior to Admission medications Medication Sig Start Date End Date Taking? Authorizing Provider   pantoprazole (PROTONIX) 40 MG tablet Take 1 tablet by mouth daily Takes just as needed. 5/18/21  Yes Monica Triplett MD   furosemide (LASIX) 20 MG tablet Take 1 tablet by mouth daily 4/29/21  Yes Monica Triplett MD   amiodarone (CORDARONE) 200 MG tablet Take 1 tablet by mouth 3 times daily  Patient taking differently: Take 200 mg by mouth every other day  11/25/20  Yes Wilfrido Zamora MD   rivaroxaban (XARELTO) 15 MG TABS tablet Take 1 tablet by mouth daily (with breakfast) 9/22/20  Yes Monica Triplett MD   Coenzyme Q10 (COQ-10 PO) Take by mouth   Yes Historical Provider, MD   amLODIPine (NORVASC) 5 MG tablet Take 1 tablet by mouth daily  Patient taking differently: Take 5 mg by mouth every other day  4/29/21   Monica Triplett MD      Allergies:  Lipitor and Penicillins     Review of Systems:   · Constitutional: there has been no unanticipated weight loss. There's been no change in energy level, sleep pattern, or activity level. · Eyes: No visual changes or diplopia. No scleral icterus. · ENT: No Headaches, hearing loss or vertigo. No mouth sores or sore throat. · Cardiovascular: Reviewed in HPI  · Respiratory: No cough or wheezing, no sputum production. No hematemesis. · Gastrointestinal: No abdominal pain, appetite loss, blood in stools. No change in bowel or bladder habits. · Genitourinary: No dysuria, trouble voiding, or hematuria. · Musculoskeletal:  No gait disturbance, weakness or joint complaints. · Integumentary: No rash or pruritis. · Neurological: No headache, diplopia, change in muscle strength, numbness or tingling. No change in gait, balance, coordination, mood, affect, memory, mentation, behavior. · Psychiatric: No anxiety, no depression. · Endocrine: No malaise, fatigue or temperature intolerance. No excessive thirst, fluid intake, or urination. No tremor.   · Hematologic/Lymphatic: No abnormal bruising or bleeding, blood clots or swollen lymph nodes. · Allergic/Immunologic: No nasal congestion or hives. Physical Examination:    Blood pressure (!) 172/94, pulse 82, resp. rate 18, height 5' 3\" (1.6 m), weight 182 lb (82.6 kg), SpO2 98 %,     Constitutional and General Appearance: alert,oriented, no acute distress  Skin:good turgor,intact without lesions  HEENT: EOMI ,normal  Neck:no JVD    Respiratory:  · Normal excursion and expansion without use of accessory muscles  · Resp Auscultation: Normal breath sounds without dullness  Cardiovascular:  · The apical impulses not displaced  · II/IV JIGAR, Diastolic Murmur  - JIGAR seems later peaking   · Cervical veins are not engorged  · The carotid upstroke is normal in amplitude and contour without delay or bruit  · Peripheral pulses are symmetrical and full  · There is no clubbing, cyanosis of the extremities. · No edema  · Femoral Arteries: 2+ and equal  · Pedal Pulses: 2+ and equal   Abdomen:  · No masses or tenderness  · Liver/Spleen: No Abnormalities Noted  Neurological/Psychiatric:  · Alert and oriented in all spheres  · Moves all extremities well  · Exhibits normal gait balance and coordination  · No abnormalities of mood, affect, memory, mentation, or behavior are noted    EGD 5/8/2020 (Dr. Nancy Lazar)  Large hiatal hernia, few gastric erosions, bx for H. Pylori    Colonoscopy 5/8/2020 (Dr. Nancy Lazar)  Small rectal polyp removed,sigmoid diverticulosis, small hemorrhoids    LHC 5/7/2020  Cath with 2 plus AI,no significant AV gradient. LVEDP 25 c/w high output CHF  EF 60  40% left main  40% in stent LAD,40% ostial CFX  30% RCA     Imp- high output CHF due to symptomatic anemia     Will give 1 unit of PRBC,IV iron  GI evaluation     ECHO 5/31/19  Normal left ventricle size, wall thickness and systolic function with an   estimated ejection fraction of 60%.  No regional wall motion abnormalities   are seen.   E/e\"= 12. Diastolic filling parameters suggests grade I diastolic   dysfunction.  Macario Craig to moderate mitral regurgitation   The left atrium is mildly dilated.   A bioprosthetic aortic valve (21mm Mosaic Ultra) appears well seated with a   maximum gradient of 27 mmHg and a mean gradient of 17 mmHg. Aortic valve   area of 1.4cm2. Mild aortic regurgitation.   Mild tricuspid regurgitation. PASP 24mmHg.   IVC size is normal (<2.1cm) and collapses > 50% with respiration consistent   with normal RA pressure (3mmHg).    Assessment:    CAD  No anginal symptoms. -[facility] 2004> PCI of proximal LAD  -[facility] 2013> Cath with LM normal, LAD mid 30%, distal 40%, D1-40%, D4-50%(small), widely patent proximal stent, Cx Ostial 20%. RCA Mid 30% X2 and LVG 60%. Medically managed  -[facility] 2015> No flow-significant coronary stenoses are identified. The left anterior descending stent is widely patent. -[facility] 5/7/20> 2+ AI, EF 60, 40% LM, 40% in-stent LAD,40% ostial CFX, 30% RCA, Imp- high output CHF due to symptomatic anemia     Aortic Valve Replacement   12/4/15 AVR Mosaic porcine performed by Dr. Melonie Ramirez w/ changes from 10/20-4/21- will repeat in 6 months   Dicussed consideration for TAVR in future- does not need at this time   Stable. Taking baby asa    Hypertension  Will continue to monitor -reports she has white coat -controlled at home   Blood pressure (!) 166/80, pulse 80, height 5' 3\" (1.6 m), weight 186 lb (84.4 kg), SpO2 99 %      Atrial fibrillation, history of  Has ILR now implanted by Dr Abiola Anders. Recent check revealed 1 afib Episode   Detected while exercising during cardiac rehab  High dose Xarelto gave her bruising. Takes baby asa. EKG 9/26/16> A. fib  EKG 7/3/18> Sinus rhythm - a fib resolved. EKG 5/7/20> NSR 78  On xarelto 15mg and Amiodarone   Cardioversion 12/16/2020      Hyperlipidemia  Taking Crestor 5mg    She states she had hives from Lipitor.     Anemia, history of   10/1/2020> H&H 14.6/44.5  6/17/2020> H&H 12.2/37/7  5/19/20> H&H 10.9/35.2  5/7/20> H&H 8.3/27.0  11/2/20> H&H 13.5/40.6  5/3/21> H&H 13. 1/39.7    Plan:   Love Feliz has a stable cardiac status. All cardiac test and lab results were personally reviewed by me during this office visit. Take amLODIPine (NORVASC) 5 MG tablet  Daily   Take amiodarone (CORDARONE) 200 MG tablet Daily   Follow up in 3 months w/ Echo       I appreciate the opportunity of cooperating in the care of this individual.    Yoav Feliciano M.D., [facility]    Patient's problem list, medications, allergies, past medical, surgical, social and family histories were reviewed and updated as appropriate. Scribe's attestation: This note was scribed in the presence of Dr. Ilene Feliciano MD, by Genesis Vásquez RN. The scribe's documentation has been prepared under my direction and personally reviewed by me in its entirety. I confirm that the note above accurately reflects all work, treatment, procedures, and medical decision making performed by me.

## 2021-08-16 ENCOUNTER — NURSE ONLY (OUTPATIENT)
Dept: CARDIOLOGY CLINIC | Age: 86
End: 2021-08-16

## 2021-08-16 DIAGNOSIS — I48.91 ATRIAL FIBRILLATION WITH RAPID VENTRICULAR RESPONSE (HCC): ICD-10-CM

## 2021-08-16 DIAGNOSIS — Z45.09 ENCOUNTER FOR ELECTRONIC ANALYSIS OF REVEAL EVENT RECORDER: ICD-10-CM

## 2021-09-17 PROCEDURE — 93298 REM INTERROG DEV EVAL SCRMS: CPT | Performed by: INTERNAL MEDICINE

## 2021-09-17 PROCEDURE — G2066 INTER DEVC REMOTE 30D: HCPCS | Performed by: INTERNAL MEDICINE

## 2021-09-20 ENCOUNTER — NURSE ONLY (OUTPATIENT)
Dept: CARDIOLOGY CLINIC | Age: 86
End: 2021-09-20
Payer: MEDICARE

## 2021-09-20 DIAGNOSIS — I48.91 ATRIAL FIBRILLATION WITH RAPID VENTRICULAR RESPONSE (HCC): ICD-10-CM

## 2021-09-20 DIAGNOSIS — Z45.09 ENCOUNTER FOR ELECTRONIC ANALYSIS OF REVEAL EVENT RECORDER: ICD-10-CM

## 2021-09-21 NOTE — PROGRESS NOTES
We received a remote transmission from patient's monitor at home. Remote Linq report shows known AF. Pt remains on Xarelto. EP physician to review. We will continue to monitor remotely.

## 2021-09-22 NOTE — RESULT ENCOUNTER NOTE
Reviewed. PAF noted. On Xarelto.      Longest AF (last 90 days): (ID# [id number]) 12-Sep-2021, Duration: 09:02:00

## 2021-09-28 ENCOUNTER — TELEPHONE (OUTPATIENT)
Dept: CARDIOLOGY CLINIC | Age: 86
End: 2021-09-28

## 2021-09-28 NOTE — PROGRESS NOTES
[facility]   Cardiac Follow up    Referring Provider:  Franco Lindsey DO   Chief Complaint   Patient presents with    Hypertension    Atrial Fibrillation    Other     with echo     History of Present Illness:   Lamont Forrest has a history of porcine aortic valve replacement and paroxysmal a fib. She has had a CVA. Ms Luisa Johnson has had 2 strokes prior to last visit; she states she was at [facility]. She did inpatient rehab at [facility] in August, 2019. Dr. Zaid Ho implanted a ILR 8/9/19. She was admitted May 2020 with chest tightness and dyspnea. LHC showed high output CHF due to symptomatic anemia. Taken off Plavix and had GI consult with EGD/colonoscopy. Recently admitted with recurrent a. Fib. Underwent MARIN and cardioversion. MARIN complicated by esophageal tear that healed with conservative care     She is not very active, does not smoke. She and her  want to fly to [place] soon, she will follow up with a cardiologist there. Discussed results of today's echo. She has been taking amiodarone  every other day     Past Medical History:   has a past medical history of Aortic valve disease, CAD (coronary artery disease), CVA (cerebral infarction), and Hypertension. Surgical History:   has a past surgical history that includes Coronary angioplasty with stent (12/22/2004); Total knee arthroplasty (Right, 7/15/2013); Cataract removal with implant (Left, 10/9/2015); Cardiac catheterization (11/19/2015); Aortic valve replacement (12/4/2015); Upper gastrointestinal endoscopy (N/A, 5/8/2020); and Colonoscopy (N/A, 5/8/2020). Social History:   reports that she has never smoked. She has never used smokeless tobacco. She reports that she does not drink alcohol and does not use drugs. Family History:  family history includes Heart Disease in her father. Home Medications:  Prior to Admission medications    Medication Sig Start Date End Date Taking?  Authorizing Provider Mirna Antunez ) 172/94, pulse 82, resp. rate 18, height 5' 3\" (1.6 m), weight 182 lb (82.6 kg), SpO2 98 %,     Constitutional and General Appearance: alert,oriented, no acute distress  Skin:good turgor,intact without lesions  HEENT: EOMI ,normal  Neck:no JVD    Respiratory:  · Normal excursion and expansion without use of accessory muscles  · Resp Auscultation: Normal breath sounds without dullness  Cardiovascular:  · The apical impulses not displaced  · II/IV JIGAR, Diastolic Murmur  - JIGAR seems later peaking   · Cervical veins are not engorged  · The carotid upstroke is normal in amplitude and contour without delay or bruit  · Peripheral pulses are symmetrical and full  · There is no clubbing, cyanosis of the extremities. · No edema  · Femoral Arteries: 2+ and equal  · Pedal Pulses: 2+ and equal   Abdomen:  · No masses or tenderness  · Liver/Spleen: No Abnormalities Noted  Neurological/Psychiatric:  · Alert and oriented in all spheres  · Moves all extremities well  · Exhibits normal gait balance and coordination  · No abnormalities of mood, affect, memory, mentation, or behavior are noted    EGD 5/8/2020 (Dr. Ede Rashid)  Large hiatal hernia, few gastric erosions, bx for H. Pylori    Colonoscopy 5/8/2020 (Dr. Ede Rashid)  Small rectal polyp removed,sigmoid diverticulosis, small hemorrhoids    LHC 5/7/2020  Cath with 2 plus AI,no significant AV gradient. LVEDP 25 c/w high output CHF  EF 60  40% left main  40% in stent LAD,40% ostial CFX  30% RCA     Imp- high output CHF due to symptomatic anemia     Will give 1 unit of PRBC,IV iron  GI evaluation     ECHO 5/31/19  Normal left ventricle size, wall thickness and systolic function with an   estimated ejection fraction of 60%.  No regional wall motion abnormalities   are seen.   E/e\"= 12. Diastolic filling parameters suggests grade I diastolic   dysfunction.   Mild to moderate mitral regurgitation   The left atrium is mildly dilated.   A bioprosthetic aortic valve (21mm Mosaic Ultra) appears well seated with a   maximum gradient of 27 mmHg and a mean gradient of 17 mmHg. Aortic valve   area of 1.4cm2. Mild aortic regurgitation.   Mild tricuspid regurgitation. PASP 24mmHg.   IVC size is normal (<2.1cm) and collapses > 50% with respiration consistent   with normal RA pressure (3mmHg).    Assessment:    CAD  No anginal symptoms. -[facility] 2004> PCI of proximal LAD  -[facility] 2013> Cath with LM normal, LAD mid 30%, distal 40%, D1-40%, D4-50%(small), widely patent proximal stent, Cx Ostial 20%. RCA Mid 30% X2 and LVG 60%. Medically managed  -[facility] 2015> No flow-significant coronary stenoses are identified. The left anterior descending stent is widely patent. -[facility] 5/7/20> 2+ AI, EF 60, 40% LM, 40% in-stent LAD,40% ostial CFX, 30% RCA, Imp- high output CHF due to symptomatic anemia     Aortic Valve Replacement   12/4/15 AVR Mosaic porcine performed by Dr. Brandon Fiore  Echo today-stable-plan for yearly echos  Stable. Taking baby asa    Hypertension  Will continue to monitor   Blood pressure 136/82, pulse 67, height 5' 3\" (1.6 m), weight 189 lb (85.7 kg), SpO2 98 %      Atrial fibrillation, history of  Has ILR now implanted by Dr Catrachito Vasquez. Recent check revealed no afib     Detected while exercising during cardiac rehab  High dose Xarelto gave her bruising. Takes baby asa. EKG 9/26/16> A. fib  EKG 7/3/18> Sinus rhythm - a fib resolved. EKG 5/7/20> NSR 78  On xarelto 15mg   Cardioversion 12/16/2020  Sinus rhythm 4/29/21    Hyperlipidemia  Previously took Crestor 5mg -unknown why stopped  She states she had hives from Lipitor. Plan:  Echo 10/8/21 read by me and results discussed with the patient. Jose Luis Granger has a stable cardiac status. All cardiac test and lab results were personally reviewed by me during this office visit.   Take amiodarone (CORDARONE) 200 MG tablet daily   Start Crestor 5mg daily   Labs after starting Crestor -fasting 8 hours  -lipids and liver    Follow up in 6 months       I appreciate

## 2021-09-29 NOTE — PROGRESS NOTES
[facility]   Electrophysiology Follow up   Date: 9/29/2021  I had the privilege of visiting Sam Mejia in the office. CC: Paroxysmal atrial fibrillation  HPI: Sam Mejia is a 80 y.o. female has a history of CAD and diagnosed with aortic valve endocarditis during her work up for CVA. Lala Monet was found to have a mass or vegetation on her aortic valve and it was suspected to be the source of the stroke.  AVR was performed on 12/4/15 by Dr. Cedric Arora was on ASA and Plavix after surgery.  Her post-op complications were a pneumothorax and C. Diff.  W hile in cardiac rehab, she was noted to have episodes of PAF.  EKG was done on 9/26/16 which confirmed Atrial fib.  She was started on Xarelto by Dr. Kvng Collazo after A fib was diagnosed but she states it is too expensive     She had a microperforation of esophagus during a MARIN due to presence of hiatal hernia. She was treated medically.       -Pt admitted to [facility] 11/27/2020 for Chest pressure and palpitations,found to be in Afib w/'s. Tachy cardia induced heart failure. Received IV amiodorone then transitioned to oral amiodorone. Discharged home on 11/29. Amiodorone 200mg bid, and Lasix 40     Routine f/u   She has been doing okay. She had an episode that woke her up in the middle of the night and had a few falls by the time she woke up in the morning it was gone. This correlates with her recorded A. fib episode on the loop monitor which lasted 9 hours    Assessment and plan:   PAF              - ECG today  Sinus Dicky Six Rate 56     -Noted on device 08/13/2021 1.1 %    - burden on device today               - s/p DCCV 12/16/2020              - Afib noted as starting on 11/14/2020 and has continued                         -S/p MARIN/DCCV 10/1/2020 with possible esophageal perforation.               - Xarelto 15 mg, tolerates without s/s of bleeding or excessive bruising    ~ creatinine clearance of   77   Ml/min based of creatinine of 0.71 05/03/2021               - amiodarone 200mg every other day. Continue with monitoring for any side effects.    ~ ALT 18, AST 26 10/01/2020     ~ TSH 5.06 05/03/2021       From OV 05/06/2021 \"We may consider ablation in the future if she has recurrence of atrial fibrillation. We will continue to monitor for side effects of amiodarone. The long-term plan will be to decrease the dose of amiodarone and see if she has recurrence or not. \"   She has been taking the amiodarone every other day. Her burden has been low and is only 0.3 percent. I think at this point we will continue monitoring her. If she has increasing burden of A. fib since she is symptomatic, will consider ablation. She is concerned about having anything done to her esophagus and recurrent tear. I explained to her that we can do the ablation without having to do a MARIN. ILR              -The CIED was interrogated and programmed and I supervised and reviewed all the data. All findings and changes are in device interrogation sheet and reflect my personal interpretation and changes and is scanned to Epic. AF episode on 9/12/2021 was 9 hours. She was symptomatic. Mears is 0.3%             Shortness of breath and edema              -She has correlated this with her onset of atrial fibrillation                  HTN  - Not well controlled 156/90   -BP goal <130/80  -Home BP monitoring encouraged, printed information provided on how to accurately measure BP at home.  -Counseled to follow a low salt diet to assure blood pressure remains controlled for cardiovascular risk factor modification.   -The patient is counseled to get regular exercise 3-5 times per week and maintain a healthy weight reduce cardiovascular risk factors.     Norvasc 5 mg daily       CAD              -followed by                    AVR              -12/4/15 AVR mosaic porcine by               -On baby ASA    The max gradient has increased from 27 in 2019 to 45 millimeters mercury 04/2021. She is following with Dr. Renu Quintanilla to decide about the next action since now she has severe stenosis by hemodynamic data. TAVR has been considered however at this point medical therapy is being pursued. Plan:   Continue current management. Follow-up in May 2022. Consider ablation if burden of A. fib increases. Patient Active Problem List    Diagnosis Date Noted    Atrial fibrillation with rapid ventricular response [facility]     Hyperlipidemia 07/23/2020    Anemia 05/07/2020    Iron deficiency anemia     Encounter for electronic analysis of reveal event recorder 08/19/2019    Cardiac arrhythmia     Cerebrovascular accident (CVA) ([facility] [address] [facility] 07/29/2019    PAF (paroxysmal atrial fibrillation) ([facility] [address] [facility] 09/26/2018    Coronary artery disease involving native coronary artery of native heart with angina pectoris ([facility]     Pneumothorax     Nonrheumatic aortic valve stenosis     S/P AVR (aortic valve replacement)     Cerebral infarction ([facility] [address] [facility] 10/13/2015    Obstructive sleep apnea syndrome 05/20/2014    Adult body mass index greater than 30 07/15/2013    Osteoarthritis of knee 07/15/2013    Hypertension 05/10/2013     Diagnostic studies:   EKg today  Sinus bradycardia Rate 56     Echo 04/30/2021   Summary   Normal left ventricle size and systolic function with an estimated ejection   fraction of 65%. Apical hypokinesis. There is mild concentric left ventricular hypertrophy. E/e\"= 11. Diastolic filling parameters suggests grade I diastolic   dysfunction. Moderate mitral regurgitation. The left atrium is dilated. A bioprosthetic aortic valve appears well seated with a maximum gradient of   45 mmHg and a mean gradient of 28 mmHg. Aortic valve area of .9cm2   Mild aortic regurgitation. Moderate tricuspid regurgitation. PASP 33mmHg. The right atrium is mildly dilated. Mild pulmonic regurgitation.    IVC size is normal (<2.1cm) and collapses > 50% with respiration consistent   with normal RA pressure (3mmHg). [facility] 5/7/2020  Cath with 2 plus AI,no significant AV gradient. LVEDP 25 c/w high output CHF  EF 60  40% left main  40% in stent LAD,40% ostial CFX  30% RCA     Imp- high output CHF due to symptomatic anemia     Will give 1 unit of PRBC,IV iron  GI evaluation      ECHO 5/31/19  Normal left ventricle size, wall thickness and systolic function with an   estimated ejection fraction of 60%. No regional wall motion abnormalities   are seen.   E/e\"= 12. Diastolic filling parameters suggests grade I diastolic   dysfunction.   Mild to moderate mitral regurgitation   The left atrium is mildly dilated.   A bioprosthetic aortic valve (21mm Mosaic Ultra) appears well seated with a   maximum gradient of 27 mmHg and a mean gradient of 17 mmHg. Aortic valve   area of 1.4cm2. Mild aortic regurgitation.   Mild tricuspid regurgitation. PASP 24mmHg.   IVC size is normal (<2.1cm) and collapses > 50% with respiration consistent   with normal RA pressure (3mmHg).      I independently reviewed the cardiac diagnostic studies, ECG and relevant imaging studies. Lab Results   Component Value Date    LVEF 65 04/30/2021    LVEFMODE Cardiac Cath 05/07/2020     Lab Results   Component Value Date    TSH 5.06 (H) 05/03/2021         Physical Examination:  There were no vitals filed for this visit. Wt Readings from Last 3 Encounters:   07/16/21 186 lb (84.4 kg)   05/06/21 183 lb (83 kg)   04/29/21 182 lb (82.6 kg)       · Constitutional: Oriented. No distress. · Head: Normocephalic and atraumatic. · Mouth/Throat: Oropharynx is clear and moist.   · Eyes: Conjunctivae normal. EOM are normal.   · Neck: Neck supple. No rigidity. No JVD present. · Cardiovascular: Normal rate, regular rhythm, S1&S2. · Pulmonary/Chest: Bilateral respiratory sounds. No wheezes, No rhonchi. · Abdominal: Soft. Bowel sounds present. No distension, No tenderness. · Musculoskeletal: No tenderness.  No edema · Lymphadenopathy: Has no cervical adenopathy. · Neurological: Alert and oriented. Cranial nerve appears intact, No Gross deficit   · Skin: Skin is warm and dry. No rash noted. · Psychiatric: Has a normal behavior       Review of System:  [x] Full ROS obtained and negative except as mentioned in HPI    Prior to Admission medications    Medication Sig Start Date End Date Taking? Authorizing Provider   pantoprazole (PROTONIX) 40 MG tablet Take 1 tablet by mouth daily Takes just as needed. 5/18/21   Denis Millard MD   amLODIPine (NORVASC) 5 MG tablet Take 1 tablet by mouth daily  Patient taking differently: Take 5 mg by mouth every other day  4/29/21   Denis Millard MD   furosemide (LASIX) 20 MG tablet Take 1 tablet by mouth daily 4/29/21   Denis Millard MD   amiodarone (CORDARONE) 200 MG tablet Take 1 tablet by mouth 3 times daily  Patient taking differently: Take 200 mg by mouth every other day  11/25/20   Kay Shelby MD   rivaroxaban (XARELTO) 15 MG TABS tablet Take 1 tablet by mouth daily (with breakfast) 9/22/20   Denis Millard MD   Coenzyme Q10 (COQ-10 PO) Take by mouth    Historical Provider, MD       Allergies   Allergen Reactions    Lipitor Hives    Penicillins        Social History:  Reviewed. reports that she has never smoked. She has never used smokeless tobacco. She reports that she does not drink alcohol and does not use drugs. Family History:  Reviewed. Reviewed. No family history of SCD. Relevant and available labs, and cardiovascular diagnostics reviewed. Reviewed. I independently reviewed relevant and available cardiac diagnostic tests ECG, CXR, Echo, Stress test, Device interrogation, Holter, CT scan. Outside medical records via Care everywhere reviewed and summarized in H&P above.     Complex medical condition with multiple medical problems affecting prognosis and outcome of EP interventions       - The patient is counseled to follow a low salt diet to assure blood pressure remains controlled for cardiovascular risk factor modification.   - The patient is counseled to avoid excess caffeine, and energy drinks as this may exacerbated ectopy and arrhythmia. - The patient is counseled to get regular exercise 3-5 times per week to control cardiovascular risk factors. - The patient is counseled to lose weigt to control cardiovascular risk factors. - The patient is counseled to avoid tobacco use. All questions and concerns were addressed to the patient/family. Alternatives to my treatment were discussed. I have discussed the above stated plan and the patient verbalized understanding and agreed with the plan. I, Dr. David Samuel personally performed the services described in this documentation as scribed by RN in my presence, and it is both accurate and complete.       NOTE: This report was transcribed using voice recognition software. Every effort was made to ensure accuracy, however, inadvertent computerized transcription errors may be present.      David Samuel MD, [address]   Office: [phone]  Fax: [phone]

## 2021-09-30 ENCOUNTER — NURSE ONLY (OUTPATIENT)
Dept: CARDIOLOGY CLINIC | Age: 86
End: 2021-09-30

## 2021-09-30 ENCOUNTER — OFFICE VISIT (OUTPATIENT)
Dept: CARDIOLOGY CLINIC | Age: 86
End: 2021-09-30
Payer: MEDICARE

## 2021-09-30 VITALS
DIASTOLIC BLOOD PRESSURE: 90 MMHG | HEIGHT: 63 IN | WEIGHT: 185.6 LBS | BODY MASS INDEX: 32.89 KG/M2 | HEART RATE: 62 BPM | SYSTOLIC BLOOD PRESSURE: 156 MMHG | OXYGEN SATURATION: 98 %

## 2021-09-30 DIAGNOSIS — Z79.899 LONG TERM CURRENT USE OF AMIODARONE: ICD-10-CM

## 2021-09-30 DIAGNOSIS — I25.119 CORONARY ARTERY DISEASE INVOLVING NATIVE CORONARY ARTERY OF NATIVE HEART WITH ANGINA PECTORIS (HCC): Chronic | ICD-10-CM

## 2021-09-30 DIAGNOSIS — I48.91 ATRIAL FIBRILLATION WITH RAPID VENTRICULAR RESPONSE (HCC): ICD-10-CM

## 2021-09-30 DIAGNOSIS — Z45.09 ENCOUNTER FOR ELECTRONIC ANALYSIS OF REVEAL EVENT RECORDER: ICD-10-CM

## 2021-09-30 DIAGNOSIS — I48.0 PAF (PAROXYSMAL ATRIAL FIBRILLATION) (HCC): Primary | ICD-10-CM

## 2021-09-30 DIAGNOSIS — Z95.2 S/P AVR (AORTIC VALVE REPLACEMENT): Chronic | ICD-10-CM

## 2021-09-30 DIAGNOSIS — I10 ESSENTIAL HYPERTENSION: Chronic | ICD-10-CM

## 2021-09-30 PROCEDURE — 99214 OFFICE O/P EST MOD 30 MIN: CPT | Performed by: INTERNAL MEDICINE

## 2021-09-30 NOTE — PROGRESS NOTES
Patient comes in for interrogation of their implanted loop recorder. Interrogation shows AF with a 0.3% burden. 2 AF episode noted. Last on 9/12/2021 lasting 9 hours. Implanted for Stroke. Patient remains on Xarelto and amiodarone. Please see interrogation for more detail. Patient will see Dr. Bibi Cespedes today and we will continue to follow the Patient remotely.

## 2021-10-08 ENCOUNTER — PROCEDURE VISIT (OUTPATIENT)
Dept: CARDIOLOGY CLINIC | Age: 86
End: 2021-10-08
Payer: MEDICARE

## 2021-10-08 ENCOUNTER — OFFICE VISIT (OUTPATIENT)
Dept: CARDIOLOGY CLINIC | Age: 86
End: 2021-10-08
Payer: MEDICARE

## 2021-10-08 VITALS
DIASTOLIC BLOOD PRESSURE: 82 MMHG | WEIGHT: 189 LBS | HEIGHT: 63 IN | SYSTOLIC BLOOD PRESSURE: 136 MMHG | BODY MASS INDEX: 33.49 KG/M2 | HEART RATE: 67 BPM | OXYGEN SATURATION: 98 %

## 2021-10-08 DIAGNOSIS — I48.91 ATRIAL FIBRILLATION WITH RAPID VENTRICULAR RESPONSE (HCC): Primary | ICD-10-CM

## 2021-10-08 DIAGNOSIS — Z95.2 S/P AVR: ICD-10-CM

## 2021-10-08 DIAGNOSIS — E78.5 HYPERLIPIDEMIA, UNSPECIFIED HYPERLIPIDEMIA TYPE: ICD-10-CM

## 2021-10-08 DIAGNOSIS — I10 PRIMARY HYPERTENSION: ICD-10-CM

## 2021-10-08 DIAGNOSIS — Z95.2 S/P AVR (AORTIC VALVE REPLACEMENT): ICD-10-CM

## 2021-10-08 DIAGNOSIS — I25.119 CORONARY ARTERY DISEASE INVOLVING NATIVE CORONARY ARTERY OF NATIVE HEART WITH ANGINA PECTORIS (HCC): ICD-10-CM

## 2021-10-08 DIAGNOSIS — I35.0 NONRHEUMATIC AORTIC VALVE STENOSIS: ICD-10-CM

## 2021-10-08 LAB
LV EF: 60 %
LVEF MODALITY: NORMAL

## 2021-10-08 PROCEDURE — 93306 TTE W/DOPPLER COMPLETE: CPT | Performed by: INTERNAL MEDICINE

## 2021-10-08 PROCEDURE — 99214 OFFICE O/P EST MOD 30 MIN: CPT | Performed by: INTERNAL MEDICINE

## 2021-10-08 RX ORDER — ROSUVASTATIN CALCIUM 5 MG/1
5 TABLET, COATED ORAL DAILY
Qty: 90 TABLET | Refills: 1 | Status: SHIPPED | OUTPATIENT
Start: 2021-10-08 | End: 2022-07-28

## 2021-10-08 RX ORDER — AMIODARONE HYDROCHLORIDE 200 MG/1
200 TABLET ORAL DAILY
Qty: 90 TABLET | Refills: 3 | Status: SHIPPED | OUTPATIENT
Start: 2021-10-08 | End: 2022-06-28 | Stop reason: SDUPTHER

## 2021-10-08 NOTE — PATIENT INSTRUCTIONS
Take amiodarone (CORDARONE) 200 MG tablet daily     Start Crestor 5mg daily     Have labs drawn soon- fast 8 hours prior

## 2021-10-25 ENCOUNTER — NURSE ONLY (OUTPATIENT)
Dept: CARDIOLOGY CLINIC | Age: 86
End: 2021-10-25
Payer: MEDICARE

## 2021-10-25 DIAGNOSIS — Z45.09 ENCOUNTER FOR ELECTRONIC ANALYSIS OF REVEAL EVENT RECORDER: ICD-10-CM

## 2021-10-25 DIAGNOSIS — I48.91 ATRIAL FIBRILLATION WITH RAPID VENTRICULAR RESPONSE (HCC): ICD-10-CM

## 2021-10-25 PROCEDURE — 93298 REM INTERROG DEV EVAL SCRMS: CPT | Performed by: INTERNAL MEDICINE

## 2021-10-25 PROCEDURE — G2066 INTER DEVC REMOTE 30D: HCPCS | Performed by: INTERNAL MEDICINE

## 2021-11-29 ENCOUNTER — NURSE ONLY (OUTPATIENT)
Dept: CARDIOLOGY CLINIC | Age: 86
End: 2021-11-29
Payer: MEDICARE

## 2021-11-29 DIAGNOSIS — Z45.09 ENCOUNTER FOR ELECTRONIC ANALYSIS OF REVEAL EVENT RECORDER: ICD-10-CM

## 2021-11-29 DIAGNOSIS — I48.91 ATRIAL FIBRILLATION WITH RAPID VENTRICULAR RESPONSE (HCC): ICD-10-CM

## 2021-11-29 PROCEDURE — G2066 INTER DEVC REMOTE 30D: HCPCS | Performed by: INTERNAL MEDICINE

## 2021-11-29 PROCEDURE — 93298 REM INTERROG DEV EVAL SCRMS: CPT | Performed by: INTERNAL MEDICINE

## 2022-01-03 ENCOUNTER — NURSE ONLY (OUTPATIENT)
Dept: CARDIOLOGY CLINIC | Age: 87
End: 2022-01-03
Payer: MEDICARE

## 2022-01-03 DIAGNOSIS — I48.91 ATRIAL FIBRILLATION WITH RAPID VENTRICULAR RESPONSE (HCC): ICD-10-CM

## 2022-01-03 DIAGNOSIS — Z45.09 ENCOUNTER FOR ELECTRONIC ANALYSIS OF REVEAL EVENT RECORDER: ICD-10-CM

## 2022-01-06 PROCEDURE — 93298 REM INTERROG DEV EVAL SCRMS: CPT | Performed by: INTERNAL MEDICINE

## 2022-01-06 PROCEDURE — G2066 INTER DEVC REMOTE 30D: HCPCS | Performed by: INTERNAL MEDICINE

## 2022-01-18 ENCOUNTER — TELEPHONE (OUTPATIENT)
Dept: CARDIOLOGY CLINIC | Age: 87
End: 2022-01-18

## 2022-01-18 NOTE — TELEPHONE ENCOUNTER
Pt would like last office visit notes and echo,  procedure notes from 10/5 -12/16/2020 sent to Cardiologist [place]  Pt has appt 3/25 1 pm.     Dr. Vinh Mojica MD  [address]  P: [phone]   F: [phone]    Pls advise thank you

## 2022-02-07 ENCOUNTER — NURSE ONLY (OUTPATIENT)
Dept: CARDIOLOGY CLINIC | Age: 87
End: 2022-02-07
Payer: MEDICARE

## 2022-02-07 DIAGNOSIS — I48.91 ATRIAL FIBRILLATION WITH RAPID VENTRICULAR RESPONSE (HCC): ICD-10-CM

## 2022-02-07 DIAGNOSIS — Z45.09 ENCOUNTER FOR ELECTRONIC ANALYSIS OF REVEAL EVENT RECORDER: ICD-10-CM

## 2022-02-08 PROCEDURE — 93298 REM INTERROG DEV EVAL SCRMS: CPT | Performed by: INTERNAL MEDICINE

## 2022-02-08 PROCEDURE — G2066 INTER DEVC REMOTE 30D: HCPCS | Performed by: INTERNAL MEDICINE

## 2022-03-14 ENCOUNTER — NURSE ONLY (OUTPATIENT)
Dept: CARDIOLOGY CLINIC | Age: 87
End: 2022-03-14
Payer: MEDICARE

## 2022-03-14 DIAGNOSIS — Z45.09 ENCOUNTER FOR ELECTRONIC ANALYSIS OF REVEAL EVENT RECORDER: ICD-10-CM

## 2022-03-14 DIAGNOSIS — I48.91 ATRIAL FIBRILLATION WITH RAPID VENTRICULAR RESPONSE (HCC): ICD-10-CM

## 2022-03-15 PROCEDURE — G2066 INTER DEVC REMOTE 30D: HCPCS | Performed by: INTERNAL MEDICINE

## 2022-03-15 PROCEDURE — 93298 REM INTERROG DEV EVAL SCRMS: CPT | Performed by: INTERNAL MEDICINE

## 2022-04-02 ASSESSMENT — TONOMETRY
OS_IOP_MMHG: 14
OD_IOP_MMHG: 16

## 2022-04-02 ASSESSMENT — VISUAL ACUITY
OD_CC: 20/40
OS_SC: 20/200
OS_CC: 20/25-2
OD_CC: 20/25-2
OD_SC: 20/200
OS_CC: 20/30

## 2022-04-18 ENCOUNTER — NURSE ONLY (OUTPATIENT)
Dept: CARDIOLOGY CLINIC | Age: 87
End: 2022-04-18
Payer: MEDICARE

## 2022-04-18 DIAGNOSIS — I48.91 ATRIAL FIBRILLATION WITH RAPID VENTRICULAR RESPONSE (HCC): ICD-10-CM

## 2022-04-18 DIAGNOSIS — Z45.09 ENCOUNTER FOR ELECTRONIC ANALYSIS OF REVEAL EVENT RECORDER: ICD-10-CM

## 2022-04-19 PROCEDURE — G2066 INTER DEVC REMOTE 30D: HCPCS | Performed by: INTERNAL MEDICINE

## 2022-04-19 PROCEDURE — 93298 REM INTERROG DEV EVAL SCRMS: CPT | Performed by: INTERNAL MEDICINE

## 2022-05-23 ENCOUNTER — NURSE ONLY (OUTPATIENT)
Dept: CARDIOLOGY CLINIC | Age: 87
End: 2022-05-23
Payer: MEDICARE

## 2022-05-23 DIAGNOSIS — Z45.09 ENCOUNTER FOR ELECTRONIC ANALYSIS OF REVEAL EVENT RECORDER: ICD-10-CM

## 2022-05-23 DIAGNOSIS — I48.91 ATRIAL FIBRILLATION WITH RAPID VENTRICULAR RESPONSE (HCC): ICD-10-CM

## 2022-05-26 PROCEDURE — 93298 REM INTERROG DEV EVAL SCRMS: CPT | Performed by: INTERNAL MEDICINE

## 2022-05-26 PROCEDURE — G2066 INTER DEVC REMOTE 30D: HCPCS | Performed by: INTERNAL MEDICINE

## 2022-06-01 RX ORDER — PANTOPRAZOLE SODIUM 40 MG/1
TABLET, DELAYED RELEASE ORAL
Qty: 90 TABLET | Refills: 3 | Status: SHIPPED | OUTPATIENT
Start: 2022-06-01

## 2022-06-01 NOTE — TELEPHONE ENCOUNTER
Received refill request for Protonix from  [facility].     Last ov:10/08/2021 LES    Last Refill:05/18/2021 # 90 tabs w/ 3refills    Next appointment:06/28/2022 LES

## 2022-06-21 NOTE — PROGRESS NOTES
[address]   Cardiac Follow up    Referring Provider:  Randalyn Gilford, DO   Chief Complaint   Patient presents with    Hypertension    Atrial Fibrillation    Coronary Artery Disease    Shortness of Breath    6 Month Follow-Up     History of Present Illness:   Jhonatan Olea has a history of porcine aortic valve replacement and paroxysmal a fib. She has had a CVA. Ms Cande Hernández has had 2 strokes prior to last visit; she states she was at [facility] then . She did inpatient rehab at [facility] in August, 2019. Dr. Abdirahman Galdamez implanted a ILR 8/9/19. She was admitted May 2020 with chest tightness and dyspnea. LHC showed high output CHF due to symptomatic anemia. Taken off Plavix and had GI consult with EGD/colonoscopy. Recently admitted with recurrent a. Fib. Underwent MARIN and cardioversion. MARIN complicated by esophageal tear that healed with conservative care    She is here with her . She states she has had shortness of breath since winter. She feels like an elephant is sitting on her chest. She notices this when she exerts herself. She noticed this when she gets about half way up her steps at home. Past Medical History:   has a past medical history of Aortic valve disease, CAD (coronary artery disease), CVA (cerebral infarction), and Hypertension. Surgical History:   has a past surgical history that includes Coronary angioplasty with stent (12/22/2004); Total knee arthroplasty (Right, 7/15/2013); Cataract removal with implant (Left, 10/9/2015); Cardiac catheterization (11/19/2015); Aortic valve replacement (12/4/2015); Upper gastrointestinal endoscopy (N/A, 5/8/2020); and Colonoscopy (N/A, 5/8/2020). Social History:   reports that she has never smoked. She has never used smokeless tobacco. She reports that she does not drink alcohol and does not use drugs. Family History:  family history includes Heart Disease in her father.      Home Medications:  Prior to Admission medications Medication Sig Start Date End Date Taking? Authorizing Provider   pantoprazole (PROTONIX) 40 MG tablet TAKE ONE TABLET BY MOUTH DAILY AS NEEDED 6/1/22  Yes Lenin Wong MD   amiodarone (CORDARONE) 200 MG tablet Take 1 tablet by mouth daily 10/8/21  Yes Lenin Wong MD   rosuvastatin (CRESTOR) 5 MG tablet Take 1 tablet by mouth daily 10/8/21  Yes Lenin Wong MD   rivaroxaban (XARELTO) 15 MG TABS tablet Take 1 tablet by mouth daily (with breakfast) 10/8/21  Yes Lenin Wong MD   amLODIPine (NORVASC) 5 MG tablet Take 1 tablet by mouth daily 4/29/21  Yes Lenin Wong MD   furosemide (LASIX) 20 MG tablet Take 1 tablet by mouth daily 4/29/21  Yes Lenin Wong MD   Coenzyme Q10 (COQ-10 PO) Take by mouth   Yes Historical Provider, MD      Allergies:  Lipitor and Penicillins     Review of Systems:   · Constitutional: there has been no unanticipated weight loss. There's been no change in energy level, sleep pattern, or activity level. · Eyes: No visual changes or diplopia. No scleral icterus. · ENT: No Headaches, hearing loss or vertigo. No mouth sores or sore throat. · Cardiovascular: Reviewed in HPI  · Respiratory: No cough or wheezing, no sputum production. No hematemesis. · Gastrointestinal: No abdominal pain, appetite loss, blood in stools. No change in bowel or bladder habits. · Genitourinary: No dysuria, trouble voiding, or hematuria. · Musculoskeletal:  No gait disturbance, weakness or joint complaints. · Integumentary: No rash or pruritis. · Neurological: No headache, diplopia, change in muscle strength, numbness or tingling. No change in gait, balance, coordination, mood, affect, memory, mentation, behavior. · Psychiatric: No anxiety, no depression. · Endocrine: No malaise, fatigue or temperature intolerance. No excessive thirst, fluid intake, or urination. No tremor. · Hematologic/Lymphatic: No abnormal bruising or bleeding, blood clots or swollen lymph nodes.   · Allergic/Immunologic: No nasal congestion or hives. Physical Examination:    Blood pressure (!) 172/94, pulse 82, resp. rate 18, height 5' 3\" (1.6 m), weight 182 lb (82.6 kg), SpO2 98 %,     Constitutional and General Appearance: alert,oriented, no acute distress  Skin:good turgor,intact without lesions  HEENT: EOMI ,normal  Neck:no JVD    Respiratory:  · Normal excursion and expansion without use of accessory muscles  · Resp Auscultation: Normal breath sounds without dullness  Cardiovascular:  · The apical impulses not displaced  · II/IV JIGAR, Diastolic Murmur  - JIGAR seems later peaking   · Cervical veins are not engorged  · The carotid upstroke is normal in amplitude and contour without delay or bruit  · Peripheral pulses are symmetrical and full  · There is no clubbing, cyanosis of the extremities. · No edema  · Femoral Arteries: 2+ and equal  · Pedal Pulses: 2+ and equal   Abdomen:  · No masses or tenderness  · Liver/Spleen: No Abnormalities Noted  Neurological/Psychiatric:  · Alert and oriented in all spheres  · Moves all extremities well  · Exhibits normal gait balance and coordination  · No abnormalities of mood, affect, memory, mentation, or behavior are noted    EGD 5/8/2020 (Dr. Love Bishop)  Large hiatal hernia, few gastric erosions, bx for H. Pylori    Colonoscopy 5/8/2020 (Dr. Love Bishop)  Small rectal polyp removed,sigmoid diverticulosis, small hemorrhoids    C 5/7/2020  Cath with 2 plus AI,no significant AV gradient. LVEDP 25 c/w high output CHF  EF 60  40% left main  40% in stent LAD,40% ostial CFX  30% RCA     Imp- high output CHF due to symptomatic anemia     Will give 1 unit of PRBC,IV iron  GI evaluation     ECHO 5/31/19  Normal left ventricle size, wall thickness and systolic function with an   estimated ejection fraction of 60%.  No regional wall motion abnormalities   are seen.   E/e\"= 12. Diastolic filling parameters suggests grade I diastolic   dysfunction.   Mild to moderate mitral regurgitation   The left atrium is mildly dilated.   A bioprosthetic aortic valve (21mm Mosaic Ultra) appears well seated with a   maximum gradient of 27 mmHg and a mean gradient of 17 mmHg. Aortic valve   area of 1.4cm2. Mild aortic regurgitation.   Mild tricuspid regurgitation. PASP 24mmHg.   IVC size is normal (<2.1cm) and collapses > 50% with respiration consistent   with normal RA pressure (3mmHg).    Assessment:    CAD  No anginal symptoms. -[facility] 2004> PCI of proximal LAD  -[facility] 2013> Cath with LM normal, LAD mid 30%, distal 40%, D1-40%, D4-50%(small), widely patent proximal stent, Cx Ostial 20%. RCA Mid 30% X2 and LVG 60%. Medically managed  -[facility] 2015> No flow-significant coronary stenoses are identified. The left anterior descending stent is widely patent. -[facility] 5/7/20> 2+ AI, EF 60, 40% LM, 40% in-stent LAD,40% ostial CFX, 30% RCA, Imp- high output CHF due to symptomatic anemia     Aortic Valve Replacement   12/4/15 AVR Mosaic porcine performed by Dr. Nora Riley. Hypertension  Will continue to monitor   Blood pressure 136/82, pulse 67, height 5' 3\" (1.6 m), weight 189 lb (85.7 kg), SpO2 98 %      Atrial fibrillation, history of  Has ILR now implanted by Dr Reji Shook. Recent check revealed no afib     Detected while exercising during cardiac rehab  High dose Xarelto gave her bruising. Takes baby asa. EKG 9/26/16> A. fib  EKG 7/3/18> Sinus rhythm - a fib resolved. EKG 5/7/20> NSR 78  On xarelto 15mg   Cardioversion 12/16/2020  Sinus rhythm 4/29/21    Hyperlipidemia  Previously took Crestor 5mg -unknown why stopped  She states she had hives from Lipitor. Plan:    Marry Eid   Start Lasix 20 mg BID  Check weight daily in the morning  Echo with appointment 4 weeks      I appreciate the opportunity of cooperating in the care of this individual.    Radha Toscano M.D., [facility]      Patient's problem list, medications, allergies, past medical, surgical, social and family histories were reviewed and updated as appropriate. Scribe's attestation: This note was scribed in the presence of Dr. Darrell Tolliver MD, by Pratik Carreon RN. The scribe's documentation has been prepared under my direction and personally reviewed by me in its entirety. I confirm that the note above accurately reflects all work, treatment, procedures, and medical decision making performed by me.

## 2022-06-27 ENCOUNTER — NURSE ONLY (OUTPATIENT)
Dept: CARDIOLOGY CLINIC | Age: 87
End: 2022-06-27
Payer: MEDICARE

## 2022-06-27 DIAGNOSIS — I48.91 ATRIAL FIBRILLATION WITH RAPID VENTRICULAR RESPONSE (HCC): ICD-10-CM

## 2022-06-27 DIAGNOSIS — Z45.09 ENCOUNTER FOR ELECTRONIC ANALYSIS OF REVEAL EVENT RECORDER: ICD-10-CM

## 2022-06-27 PROCEDURE — G2066 INTER DEVC REMOTE 30D: HCPCS | Performed by: INTERNAL MEDICINE

## 2022-06-27 PROCEDURE — 93298 REM INTERROG DEV EVAL SCRMS: CPT | Performed by: INTERNAL MEDICINE

## 2022-06-27 NOTE — PROGRESS NOTES
We received a remote transmission from patient's monitor at home. Remote Linq report shows known AF. Pt remains on Xarelto. EP physician to review. We will continue to monitor remotely. End of 31-day monitoring period 6-27-22.

## 2022-06-28 ENCOUNTER — OFFICE VISIT (OUTPATIENT)
Dept: CARDIOLOGY CLINIC | Age: 87
End: 2022-06-28
Payer: MEDICARE

## 2022-06-28 VITALS
DIASTOLIC BLOOD PRESSURE: 70 MMHG | HEIGHT: 63 IN | SYSTOLIC BLOOD PRESSURE: 130 MMHG | BODY MASS INDEX: 35.12 KG/M2 | HEART RATE: 77 BPM | WEIGHT: 198.2 LBS | OXYGEN SATURATION: 99 %

## 2022-06-28 DIAGNOSIS — I25.119 CORONARY ARTERY DISEASE INVOLVING NATIVE CORONARY ARTERY OF NATIVE HEART WITH ANGINA PECTORIS (HCC): Chronic | ICD-10-CM

## 2022-06-28 DIAGNOSIS — I10 PRIMARY HYPERTENSION: Chronic | ICD-10-CM

## 2022-06-28 DIAGNOSIS — I48.91 ATRIAL FIBRILLATION WITH RAPID VENTRICULAR RESPONSE (HCC): ICD-10-CM

## 2022-06-28 DIAGNOSIS — Z95.2 S/P AVR (AORTIC VALVE REPLACEMENT): Chronic | ICD-10-CM

## 2022-06-28 DIAGNOSIS — R06.02 SOB (SHORTNESS OF BREATH): Primary | ICD-10-CM

## 2022-06-28 PROCEDURE — 1123F ACP DISCUSS/DSCN MKR DOCD: CPT | Performed by: INTERNAL MEDICINE

## 2022-06-28 PROCEDURE — 99214 OFFICE O/P EST MOD 30 MIN: CPT | Performed by: INTERNAL MEDICINE

## 2022-06-28 RX ORDER — FUROSEMIDE 20 MG/1
20 TABLET ORAL 2 TIMES DAILY
Qty: 90 TABLET | Refills: 3 | Status: SHIPPED | OUTPATIENT
Start: 2022-06-28

## 2022-06-28 RX ORDER — AMIODARONE HYDROCHLORIDE 200 MG/1
200 TABLET ORAL DAILY
Qty: 90 TABLET | Refills: 3 | Status: SHIPPED | OUTPATIENT
Start: 2022-06-28 | End: 2022-10-28

## 2022-06-28 RX ORDER — AMLODIPINE BESYLATE 5 MG/1
5 TABLET ORAL DAILY
Qty: 90 TABLET | Refills: 3 | Status: SHIPPED | OUTPATIENT
Start: 2022-06-28

## 2022-07-06 LAB
HCT VFR BLD CALC: 26.1 % (ref 36–46)
HEMOGLOBIN: 8 G/DL (ref 12–15.2)
MCH RBC QN AUTO: 21 PG (ref 27–33)
MCHC RBC AUTO-ENTMCNC: 30.4 G/DL (ref 32–36)
MCV RBC AUTO: 68.9 FL (ref 82–97)
PDW BLD-RTO: 21.4 %
PLATELET # BLD: 256 THOU/MCL (ref 140–375)
PMV BLD AUTO: 7.4 FL (ref 7.4–11.5)
RBC # BLD: 3.79 MIL/MCL (ref 3.8–5.2)
WBC # BLD: 3.8 THOU/MCL (ref 3.6–10.5)

## 2022-07-08 ENCOUNTER — TELEPHONE (OUTPATIENT)
Dept: CARDIOLOGY CLINIC | Age: 87
End: 2022-07-08

## 2022-07-08 NOTE — TELEPHONE ENCOUNTER
----- Message from Gt Kelsey MD sent at 7/7/2022  6:23 AM EDT -----  Have her stop xarelto and contact Dr Hannah Jackman as she is becoming anemic again.  Likely reason she is more short of breath

## 2022-07-10 LAB
ALBUMIN SERPL-MCNC: 4 G/DL (ref 3.5–5)
ANION GAP SERPL CALCULATED.3IONS-SCNC: 10 MMOL/L (ref 6–18)
BUN BLDV-MCNC: 23 MG/DL (ref 8–26)
CALCIUM SERPL-MCNC: 9.5 MG/DL (ref 8.8–10.1)
CHLORIDE BLD-SCNC: 104 MEQ/L (ref 98–111)
CO2: 22 MMOL/L (ref 21–31)
CREAT SERPL-MCNC: 0.8 MG/DL (ref 0.44–1.03)
EGFR (CKD-EPI): 71 ML/MIN/1.73 M2
GLUCOSE BLD-MCNC: 85 MG/DL (ref 70–99)
PHOSPHORUS: 3.8 MG/DL (ref 2.5–4.5)
POTASSIUM SERPL-SCNC: 5.1 MEQ/L (ref 3.6–5.1)
SODIUM BLD-SCNC: 136 MEQ/L (ref 135–145)

## 2022-07-25 NOTE — PROGRESS NOTES
DAY WITH FOOD 7/11/22  Yes Historical Provider, MD   furosemide (LASIX) 20 MG tablet Take 1 tablet by mouth 2 times daily 6/28/22  Yes Nathalie Chen MD   amLODIPine (NORVASC) 5 MG tablet Take 1 tablet by mouth daily 6/28/22  Yes Nathalie Chen MD   pantoprazole (PROTONIX) 40 MG tablet TAKE ONE TABLET BY MOUTH DAILY AS NEEDED 6/1/22  Yes Nathalie Chen MD   Coenzyme Q10 (COQ-10 PO) Take by mouth   Yes Historical Provider, MD   amiodarone (CORDARONE) 200 MG tablet Take 1 tablet by mouth daily  Patient not taking: Reported on 7/28/2022 6/28/22   Nathalie Chen MD      Allergies:  Lipitor and Penicillins     Review of Systems:   Constitutional: there has been no unanticipated weight loss. There's been no change in energy level, sleep pattern, or activity level. Eyes: No visual changes or diplopia. No scleral icterus. ENT: No Headaches, hearing loss or vertigo. No mouth sores or sore throat. Cardiovascular: Reviewed in HPI  Respiratory: No cough or wheezing, no sputum production. No hematemesis. Gastrointestinal: No abdominal pain, appetite loss, blood in stools. No change in bowel or bladder habits. Genitourinary: No dysuria, trouble voiding, or hematuria. Musculoskeletal:  No gait disturbance, weakness or joint complaints. Integumentary: No rash or pruritis. Neurological: No headache, diplopia, change in muscle strength, numbness or tingling. No change in gait, balance, coordination, mood, affect, memory, mentation, behavior. Psychiatric: No anxiety, no depression. Endocrine: No malaise, fatigue or temperature intolerance. No excessive thirst, fluid intake, or urination. No tremor. Hematologic/Lymphatic: No abnormal bruising or bleeding, blood clots or swollen lymph nodes. Allergic/Immunologic: No nasal congestion or hives. Physical Examination:    Blood pressure (!) 172/94, pulse 82, resp.  rate 18, height 5' 3\" (1.6 m), weight 182 lb (82.6 kg), SpO2 98 %,     Constitutional and General Appearance: alert,oriented, no acute distress  Skin:good turgor,intact without lesions  HEENT: EOMI ,normal  Neck:no JVD    Respiratory:  Normal excursion and expansion without use of accessory muscles  Resp Auscultation: Normal breath sounds without dullness  Cardiovascular: The apical impulses not displaced  II/IV JIGAR, Diastolic Murmur  - JIGAR seems later peaking   Cervical veins are not engorged  The carotid upstroke is normal in amplitude and contour without delay or bruit  Peripheral pulses are symmetrical and full  There is no clubbing, cyanosis of the extremities. No edema  Femoral Arteries: 2+ and equal  Pedal Pulses: 2+ and equal   Abdomen:  No masses or tenderness  Liver/Spleen: No Abnormalities Noted  Neurological/Psychiatric:  Alert and oriented in all spheres  Moves all extremities well  Exhibits normal gait balance and coordination  No abnormalities of mood, affect, memory, mentation, or behavior are noted    [address] [place] [address] 5/7/2020  Cath with 2 plus AI,no significant AV gradient. LVEDP 25 c/w high output CHF  EF 60  40% left main  40% in stent LAD,40% ostial CFX  30% RCA     Imp- high output CHF due to symptomatic anemia     Will give 1 unit of PRBC,IV iron  GI evaluation     ECHO 10/8/21  Summary  Normal left ventricle size and systolic function with an estimated ejection fraction of 60%. No regional wall motion abnormalities are seen. There is mild concentric left ventricular hypertrophy. E/e\"= 12. Diastolic filling parameters suggests grade I diastolic dysfunction. Mild to moderate mitral regurgitation  The left atrium is mildly dilated. A bioprosthetic aortic valve appears well seated with a maximum gradient of 46 mmHg and a mean gradient of 27 mmHg. Aortic valve area of 1.34cm2. Trivial aortic regurgitation. Moderate tricuspid regurgitation. RVSP 28mmHg. The right atrium is mildly dilated. IVC size is normal (<2.1cm) and collapses > 50% with respiration consistent with normal RA pressure (3mmHg).     Echo 7/28/22  Summary   Normal left ventricle size and systolic function with an estimated ejection   fraction of 60%. No regional wall motion abnormalities are seen. There is mild concentric left ventricular hypertrophy. E/e\"= 14. Diastolic filling parameters suggests grade II diastolic   dysfunction. Moderate mitral regurgitation. The left atrium is mildly dilated. A bioprosthetic aortic valve appears well seated with a maximum gradient of   38 mmHg and a mean gradient of 23 mmHg. Aortic valve area of 1.3cm2. Trivial   aortic regurgitation. Moderate tricuspid regurgitation. RVSP 44mmHg. PHTN. The right atrium is mildly dilated. Mild pulmonic regurgitation. IVC size is normal (<2.1cm) and collapses > 50% with respiration consistent   with normal RA pressure (3mmHg). Assessment:    CAD  Stable    -[facility] 2004> PCI of proximal LAD  -[facility] 2013> Cath with LM normal, LAD mid 30%, distal 40%, D1-40%, D4-50%(small), widely patent proximal stent, Cx Ostial 20%. RCA Mid 30% X2 and LVG 60%. Medically managed  -[facility] 2015> No flow-significant coronary stenoses are identified. The left anterior descending stent is widely patent. -[facility] 5/7/20> 2+ AI, EF 60, 40% LM, 40% in-stent LAD,40% ostial CFX, 30% RCA, Imp- high output CHF due to symptomatic anemia     Aortic Valve Replacement   12/4/15 AVR Mosaic porcine performed by Dr. Malvin Barr  Echo 10/21- well seated  Echo 7/28/22 - well seated       Hypertension elevated in office today   BP (!) 178/80 (Site: Right Upper Arm, Position: Sitting, Cuff Size: Medium Adult)   Pulse 68   Ht 5' 3\" (1.6 m)   Wt 191 lb (86.6 kg)   SpO2 99%   BMI 33.83 kg/m²   -amlodipine 5 mg daily      Atrial fibrillation, history of  Has ILR now implanted by Dr Juli Myers. Detected while exercising during cardiac rehab  High dose Xarelto gave her bruising. Takes baby asa. EKG 9/26/16> A. fib  EKG 7/3/18> Sinus rhythm - a fib resolved.   EKG 5/7/20> NSR 78  On amio 200 mg - Xarelto was stopped Cardioversion 12/16/2020  Sinus rhythm 4/29/21    Poor candidate for anticoagulation    Hyperlipidemia  9/18/20  TG 82 HDL 79   Previously took Crestor 5mg -unknown why stopped  She states she had hives from Lipitor. CHF   SOB improving  During admission 5/22/20 d/t symptomatic anemia  -on lasix   -echo 7/28/22> EF 60%, moderate mitral regurgitation       Plan:    [address] has a stable cardiac status. Cardiac test and lab results personally reviewed by me during this office visit and discussed. No med changes. Dr Libertad Murray asking if carotids are needed, he can do them in his office. OK to update, last done '19  Continue risk factor modifications. Call for any change in symptoms. Return for regular follow up in 6  months. I appreciate the opportunity of cooperating in the care of this individual.    Graham Zavala. Catherine Fernandez M.D., [address] [facility]      Patient's problem list, medications, allergies, past medical, surgical, social and family histories were reviewed and updated as appropriate. Scribe's Attestation: This note was scribed in the presence of Dr. Aisha Roman MD by Lukasz Mcpherson, NIKI. The scribe's documentation has been prepared under my direction and personally reviewed by me in its entirety. I confirm that the note above accurately reflects all work, treatment, procedures, and medical decision making performed by me.

## 2022-07-28 ENCOUNTER — OFFICE VISIT (OUTPATIENT)
Dept: CARDIOLOGY CLINIC | Age: 87
End: 2022-07-28
Payer: MEDICARE

## 2022-07-28 ENCOUNTER — PROCEDURE VISIT (OUTPATIENT)
Dept: CARDIOLOGY CLINIC | Age: 87
End: 2022-07-28
Payer: MEDICARE

## 2022-07-28 VITALS
HEART RATE: 68 BPM | WEIGHT: 191 LBS | HEIGHT: 63 IN | SYSTOLIC BLOOD PRESSURE: 178 MMHG | BODY MASS INDEX: 33.84 KG/M2 | OXYGEN SATURATION: 99 % | DIASTOLIC BLOOD PRESSURE: 80 MMHG

## 2022-07-28 DIAGNOSIS — I48.0 PAF (PAROXYSMAL ATRIAL FIBRILLATION) (HCC): ICD-10-CM

## 2022-07-28 DIAGNOSIS — Z95.2 S/P AVR (AORTIC VALVE REPLACEMENT): Chronic | ICD-10-CM

## 2022-07-28 DIAGNOSIS — I25.119 CORONARY ARTERY DISEASE INVOLVING NATIVE CORONARY ARTERY OF NATIVE HEART WITH ANGINA PECTORIS (HCC): Primary | Chronic | ICD-10-CM

## 2022-07-28 DIAGNOSIS — I10 PRIMARY HYPERTENSION: Chronic | ICD-10-CM

## 2022-07-28 DIAGNOSIS — E78.5 HYPERLIPIDEMIA, UNSPECIFIED HYPERLIPIDEMIA TYPE: ICD-10-CM

## 2022-07-28 DIAGNOSIS — I35.0 NONRHEUMATIC AORTIC VALVE STENOSIS: Chronic | ICD-10-CM

## 2022-07-28 LAB
LV EF: 60 %
LVEF MODALITY: NORMAL

## 2022-07-28 PROCEDURE — 93306 TTE W/DOPPLER COMPLETE: CPT | Performed by: INTERNAL MEDICINE

## 2022-07-28 PROCEDURE — 99214 OFFICE O/P EST MOD 30 MIN: CPT | Performed by: INTERNAL MEDICINE

## 2022-07-28 PROCEDURE — 1123F ACP DISCUSS/DSCN MKR DOCD: CPT | Performed by: INTERNAL MEDICINE

## 2022-07-28 RX ORDER — FERROUS SULFATE 325(65) MG
TABLET ORAL
COMMUNITY
Start: 2022-07-11

## 2022-07-28 NOTE — PATIENT INSTRUCTIONS
It is ok to have your carotids checked in Dr Lazo Mom office   It is ok to do the MRI if you are having a lot of back pain   Your device will be checked on Monday   Follow up

## 2022-08-01 ENCOUNTER — NURSE ONLY (OUTPATIENT)
Dept: CARDIOLOGY CLINIC | Age: 87
End: 2022-08-01
Payer: MEDICARE

## 2022-08-01 DIAGNOSIS — I48.91 ATRIAL FIBRILLATION WITH RAPID VENTRICULAR RESPONSE (HCC): Primary | ICD-10-CM

## 2022-08-01 DIAGNOSIS — Z45.09 ENCOUNTER FOR ELECTRONIC ANALYSIS OF REVEAL EVENT RECORDER: ICD-10-CM

## 2022-08-01 PROCEDURE — 93298 REM INTERROG DEV EVAL SCRMS: CPT | Performed by: INTERNAL MEDICINE

## 2022-08-01 PROCEDURE — G2066 INTER DEVC REMOTE 30D: HCPCS | Performed by: INTERNAL MEDICINE

## 2022-08-11 ENCOUNTER — HOSPITAL ENCOUNTER (OUTPATIENT)
Dept: PHYSICAL THERAPY | Age: 87
Setting detail: THERAPIES SERIES
Discharge: HOME | End: 2022-08-11
Payer: MEDICARE

## 2022-08-11 PROCEDURE — 97163 PT EVAL HIGH COMPLEX 45 MIN: CPT

## 2022-08-11 PROCEDURE — 97140 MANUAL THERAPY 1/> REGIONS: CPT
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
## 2022-08-11 NOTE — FLOWSHEET NOTE
[address]  Phone: [phone]   Fax:     [phone]    Physical Therapy Treatment Note/ Progress Report:       Date:  2022    Patient Name:  Chela Veliz    :  1935  MRN: [id number]  Restrictions/Precautions:    Medical/Treatment Diagnosis Information:  Diagnosis: lumbar radiculopathy, back pain  Treatment Diagnosis: lumbar radiculopathy M54.16, back pain O49.9  Insurance/Certification information:  PT Insurance Information: [employer] Medicare  Physician Information:  Yany Gunn MD  Plan of care signed (Y/N):     Date of Patient follow up with Physician:      Progress Report: [x]  Yes  []  No     Date Range for reporting period:  Beginnin2022  Ending:     Progress report due (10 Rx/or 30 days whichever is less):      Recertification due (POC duration/ or 90 days whichever is less): 2022     Visit # Insurance Allowable Auth Needed   1 [employer] Medicare []Yes    []No     Pain level:  2-9/10   Functional Scale:  Foto FS Score 40, PHILIP 49.2   Date Assessed: 2022    SUBJECTIVE:  See eval    OBJECTIVE: See eval  Observation:   Test measurements:      RESTRICTIONS/PRECAUTIONS: none    Exercises/Interventions:     Therapeutic Ex (70009)   Min: sets/sec reps notes   Hip Ext      Bridge       Kneeling Alt Arm-Leg      Side lying LB rot      Front Plank      Side planks       Kneeling hip abd/ext      1/2 kneeling down chop      Std band pull down      SL hip abd/clam      Lateral band pull      Lateral band walk      Bosu Lunge      Slide lunge       Ham string stretch      Hip Flex stretch      Glute Stretch      SLS Ball/wall glute      Manual Intervention (88311) Min: 15      DN      Prone PA 1 15    GISTM/STM      Lumbar Manip      SI Manip      Hip belt mobs      Hip LA distraction            NMR re-education (77309)   Min:      Mf Activation- re-ed      TrA Re-ed activation      Glute Max re-ed activation      Prone martín Baca            Therapeutic Activity (31815) Min:                                Therapeutic Exercise and NMR EXR  [x] (64289) Provided verbal/tactile cueing for activities related to strengthening, flexibility, endurance, ROM  for improvements in proximal hip and core control with self care, mobility, lifting and ambulation. [x] (82044) Provided verbal/tactile cueing for activities related to improving balance, coordination, kinesthetic sense, posture, motor skill, proprioception  to assist with core control in self care, mobility, lifting, and ambulation.      Therapeutic Activities:    [] (32752 or 37340) Provided verbal/tactile cueing for activities related to improving balance, coordination, kinesthetic sense, posture, motor skill, proprioception and motor activation to allow for proper function  with self care and ADLs  [] (20602) Provided training and instruction to the patient for proper core and proximal hip recruitment and positioning with ambulation re-education     Home Exercise Program:    [x] (37970) Reviewed/Progressed HEP activities related to strengthening, flexibility, endurance, ROM of core, proximal hip and LE for functional self-care, mobility, lifting and ambulation   [] (32922) Reviewed/Progressed HEP activities related to improving balance, coordination, kinesthetic sense, posture, motor skill, proprioception of core, proximal hip and LE for self care, mobility, lifting, and ambulation      Manual Treatments:  PROM / STM / Oscillations-Mobs:  G-I, II, III, IV (PA's, Inf., Post.)  [x] (31791) Provided manual therapy to mobilize proximal hip and LS spine soft tissue/joints for the purpose of modulating pain, promoting relaxation,  increasing ROM, reducing/eliminating soft tissue swelling/inflammation/restriction, improving soft tissue extensibility and allowing for proper ROM for normal function with self care, mobility, lifting and ambulation. Modalities:       Charges:  Timed Code Treatment Minutes: 15   Total Treatment Minutes: 45     [] EVAL (LOW) 33669 (typically 20 minutes face-to-face)  [] EVAL (MOD) 53586 (typically 30 minutes face-to-face)  [x] EVAL (HIGH) 39387 (typically 45 minutes face-to-face)  [] RE-EVAL     [] WC(39153) x     [] DRY NEEDLE 1 OR 2 MUSCLES  [] NMR (38994) x     [] DRY NEEDLE 3+ MUSCLES  [] Manual (31276) x       [] TA (14044) x     [] Mech Traction (43595)  [] ES(attended) (50516)     [] ES (un) (06020):   [] VASO (42248)  [] Other:    If [facility] Please Indicate Time In/Out  CPT Code Time in Time out                                     GOALS:  Patient stated goal: improve walking and ability to do housework  [] Progressing: [] Met: [] Not Met: [] Adjusted  Therapist goals for Patient:   Short Term Goals: To be achieved in: 2 weeks  1. Independent in HEP and progression per patient tolerance, in order to prevent re-injury. [] Progressing: [] Met: [] Not Met: [] Adjusted  2. Patient will have a decrease in pain to facilitate improvement in movement, function, and ADLs as indicated by Functional Deficits. [] Progressing: [] Met: [] Not Met: [] Adjusted    Long Term Goals: To be achieved in: 6 weeks  1. Disability index score of 30% or less for the PHILIP or 55 or better on Foto to assist with reaching prior level of function. [] Progressing: [] Met: [] Not Met: [] Adjusted  2. Patient will demonstrate increased AROM to WNL, good LS mobility, good hip ROM to allow for proper joint functioning as indicated by patients Functional Deficits. [] Progressing: [] Met: [] Not Met: [] Adjusted  3. Patient will demonstrate an increase in Strength to good proximal hip and core activation to allow for proper functional mobility as indicated by patients Functional Deficits. [] Progressing: [] Met: [] Not Met: [] Adjusted  4.  Patient will return to standing and walking functional activities > 30 min without increased symptoms or restriction. [] Progressing: [] Met: [] Not Met: [] Adjusted  5. Patient will report being able to perform housework with decreased symptoms or restriction, no greater than 2-3/10.(patient specific functional goal)    [] Progressing: [] Met: [] Not Met: [] Adjusted     ASSESSMENT:  See eval    Treatment/Activity Tolerance:  [x] Patient tolerated treatment well [] Patient limited by fatique  [] Patient limited by pain  [] Patient limited by other medical complications  [] Other:     Overall Progression Towards Functional goals/ Treatment Progress Update:  [] Patient is progressing as expected towards functional goals listed. [] Progression is slowed due to complexities/Impairments listed. [] Progression has been slowed due to co-morbidities. [x] Plan just implemented, too soon to assess goals progression <30days   [] Goals require adjustment due to lack of progress  [] Patient is not progressing as expected and requires additional follow up with physician  [] Other:    Prognosis for POC: [x] Good [] Fair  [] Poor    Patient requires continued skilled intervention: [x] Yes  [] No        PLAN: See eval  [] Continue per plan of care [] Alter current plan (see comments)  [x] Plan of care initiated [] Hold pending MD visit [] Discharge    Electronically signed by: Carley Méndez PT    Note: If patient does not return for scheduled/recommended follow up visits, this note will serve as a discharge from care along with the most recent update on progress.

## 2022-08-15 ENCOUNTER — HOSPITAL ENCOUNTER (OUTPATIENT)
Dept: PHYSICAL THERAPY | Age: 87
Setting detail: THERAPIES SERIES
Discharge: HOME | End: 2022-08-15
Payer: MEDICARE

## 2022-08-15 PROCEDURE — 97110 THERAPEUTIC EXERCISES: CPT

## 2022-08-15 PROCEDURE — 97140 MANUAL THERAPY 1/> REGIONS: CPT

## 2022-08-15 NOTE — FLOWSHEET NOTE
[address]  Phone: [phone]   Fax:     [phone]    Physical Therapy Treatment Note/ Progress Report:       Date:  08/15/2022    Patient Name:  Praveen Ward    :  1935  MRN: [id number]  Restrictions/Precautions:    Medical/Treatment Diagnosis Information:  Diagnosis: lumbar radiculopathy, back pain  Treatment Diagnosis: lumbar radiculopathy M54.16, back pain F31.7  Insurance/Certification information:  PT Insurance Information: A[employer] Medicare  Physician Information:  Allan Lux MD  Plan of care signed (Y/N):     Date of Patient follow up with Physician:      Progress Report: [x]  Yes  []  No     Date Range for reporting period:  Beginnin2022  Ending:     Progress report due (10 Rx/or 30 days whichever is less): 4300     Recertification due (POC duration/ or 90 days whichever is less): 2022     Visit # Insurance Allowable Auth Needed   2 [employer] Medicare []Yes    []No     Pain level:  410   Functional Scale: Foto FS Score 40, PHILIP 49.2   Date Assessed: 2022    SUBJECTIVE:  Patient reports that after last session she was able to stand for about an hour at the sink to do dishes. But by later that night she got tight again. Has been avoiding standing too long due to not wanting to irritate further up in t/spine on R. Running a little late today.     OBJECTIVE: See eval  Observation:   Test measurements:      RESTRICTIONS/PRECAUTIONS: none    Exercises/Interventions:     Therapeutic Ex (32261)   Min: 15 sets/sec reps notes   Hip Ext 1 15 bilat   Prone glute max activation 10 10 bilat   Standing hip flexion (straight leg) 1 10 L   Standing hip abduction 1 10 L , cues form   Front Plank      Side planks       Kneeling hip abd/ext      1/2 kneeling down chop      Std band pull down      SL hip abd/clam      Lateral band pull      Lateral band walk      Bosu Lunge      Slide lunge       Ham string stretch      Hip Flex stretch      Glute Stretch      SLS Ball/wall glute      Manual Intervention (15782) Min: 20      DN      Prone PA 1 20    GISTM/STM      Lumbar Manip      SI Manip      Hip belt mobs      Hip LA distraction            NMR re-education (51246)   Min:      Mf Activation- re-ed      TrA Re-ed activation      Glute Max re-ed activation      Prone martín Baca            Therapeutic Activity (11754) Min:                                Therapeutic Exercise and NMR EXR  [x] (81139) Provided verbal/tactile cueing for activities related to strengthening, flexibility, endurance, ROM  for improvements in proximal hip and core control with self care, mobility, lifting and ambulation. [x] (24812) Provided verbal/tactile cueing for activities related to improving balance, coordination, kinesthetic sense, posture, motor skill, proprioception  to assist with core control in self care, mobility, lifting, and ambulation.      Therapeutic Activities:    [] (10897 or 02452) Provided verbal/tactile cueing for activities related to improving balance, coordination, kinesthetic sense, posture, motor skill, proprioception and motor activation to allow for proper function  with self care and ADLs  [] (61225) Provided training and instruction to the patient for proper core and proximal hip recruitment and positioning with ambulation re-education     Home Exercise Program:    [x] (14822) Reviewed/Progressed HEP activities related to strengthening, flexibility, endurance, ROM of core, proximal hip and LE for functional self-care, mobility, lifting and ambulation   [] (68131) Reviewed/Progressed HEP activities related to improving balance, coordination, kinesthetic sense, posture, motor skill, proprioception of core, proximal hip and LE for self care, mobility, lifting, and ambulation      Manual Treatments:  PROM / STM / Oscillations-Mobs:  G-I, II, III, IV (PA's, Inf., Post.)  [x] (38943) Provided manual therapy to mobilize proximal hip and LS spine soft tissue/joints for the purpose of modulating pain, promoting relaxation,  increasing ROM, reducing/eliminating soft tissue swelling/inflammation/restriction, improving soft tissue extensibility and allowing for proper ROM for normal function with self care, mobility, lifting and ambulation. Modalities:       Charges:  Timed Code Treatment Minutes: 35   Total Treatment Minutes: 35     [] EVAL (LOW) 09972 (typically 20 minutes face-to-face)  [] EVAL (MOD) 82158 (typically 30 minutes face-to-face)  [] EVAL (HIGH) 85162 (typically 45 minutes face-to-face)  [] RE-EVAL     [x] PH(33148) x   1  [] DRY NEEDLE 1 OR 2 MUSCLES  [] NMR (82587) x     [] DRY NEEDLE 3+ MUSCLES  [x] Manual (99098) x   1    [] TA (61346) x     [] Mech Traction (88379)  [] ES(attended) (69305)     [] ES (un) (38258):   [] VASO (07268)  [] Other:    If [facility] Please Indicate Time In/Out  CPT Code Time in Time out                                     GOALS:  Patient stated goal: improve walking and ability to do housework  [] Progressing: [] Met: [] Not Met: [] Adjusted  Therapist goals for Patient:   Short Term Goals: To be achieved in: 2 weeks  1. Independent in HEP and progression per patient tolerance, in order to prevent re-injury. [] Progressing: [] Met: [] Not Met: [] Adjusted  2. Patient will have a decrease in pain to facilitate improvement in movement, function, and ADLs as indicated by Functional Deficits. [] Progressing: [] Met: [] Not Met: [] Adjusted    Long Term Goals: To be achieved in: 6 weeks  1. Disability index score of 30% or less for the PHILIP or 55 or better on Foto to assist with reaching prior level of function. [] Progressing: [] Met: [] Not Met: [] Adjusted  2. Patient will demonstrate increased AROM to WNL, good LS mobility, good hip ROM to allow for proper joint functioning as indicated by patients Functional Deficits.    [] Progressing: [] Met: [] Not Met: [] Adjusted  3. Patient will demonstrate an increase in Strength to good proximal hip and core activation to allow for proper functional mobility as indicated by patients Functional Deficits. [] Progressing: [] Met: [] Not Met: [] Adjusted  4. Patient will return to standing and walking functional activities > 30 min without increased symptoms or restriction. [] Progressing: [] Met: [] Not Met: [] Adjusted  5. Patient will report being able to perform housework with decreased symptoms or restriction, no greater than 2-3/10.(patient specific functional goal)    [] Progressing: [] Met: [] Not Met: [] Adjusted     ASSESSMENT:  Patient continues with t/spine and l/spine restriction on R side. Good improvement with PA to area. Fatigues quickly with strengthening of L LE. Treatment/Activity Tolerance:  [x] Patient tolerated treatment well [] Patient limited by fatique  [] Patient limited by pain  [] Patient limited by other medical complications  [] Other:     Overall Progression Towards Functional goals/ Treatment Progress Update:  [] Patient is progressing as expected towards functional goals listed. [] Progression is slowed due to complexities/Impairments listed. [] Progression has been slowed due to co-morbidities.   [x] Plan just implemented, too soon to assess goals progression <30days   [] Goals require adjustment due to lack of progress  [] Patient is not progressing as expected and requires additional follow up with physician  [] Other:    Prognosis for POC: [x] Good [] Fair  [] Poor    Patient requires continued skilled intervention: [x] Yes  [] No        PLAN: See eval  [] Continue per plan of care [] Alter current plan (see comments)  [x] Plan of care initiated [] Hold pending MD visit [] Discharge    Electronically signed by: Aileen Mesa PT    Note: If patient does not return for scheduled/recommended follow up visits, this note will serve as a discharge from care along with the most recent update on progress.

## 2022-08-18 ENCOUNTER — HOSPITAL ENCOUNTER (OUTPATIENT)
Dept: PHYSICAL THERAPY | Age: 87
Setting detail: THERAPIES SERIES
Discharge: HOME | End: 2022-08-18
Payer: MEDICARE

## 2022-08-18 PROCEDURE — 97110 THERAPEUTIC EXERCISES: CPT

## 2022-08-18 PROCEDURE — 97140 MANUAL THERAPY 1/> REGIONS: CPT

## 2022-08-18 NOTE — FLOWSHEET NOTE
[address]  Phone: [phone]   Fax:     [phone]    Physical Therapy Treatment Note/ Progress Report:       Date:  2022    Patient Name:  [place]    :  1935  MRN: [id number]  Restrictions/Precautions:    Medical/Treatment Diagnosis Information:  Diagnosis: lumbar radiculopathy, back pain  Treatment Diagnosis: lumbar radiculopathy M54.16, back pain R75.4  Insurance/Certification information:  PT Insurance Information: A[employer]na Medicare  Physician Information:  Rogerio Balbuena MD  Plan of care signed (Y/N):     Date of Patient follow up with Physician:      Progress Report: []  Yes  [x]  No     Date Range for reporting period:  Beginnin2022  Ending:     Progress report due (10 Rx/or 30 days whichever is less):      Recertification due (POC duration/ or 90 days whichever is less): 2022     Visit # Insurance Allowable Auth Needed   3 [employer] Medicare []Yes    []No     Pain level:  410   Functional Scale: Foto FS Score 40, PHILIP 49.2   Date Assessed: 2022    SUBJECTIVE:  Patient states she was very sore after last session and feels like she did too many exercises. Unsure how much was from her back vs the knee she has issue with and standing doing the exercises.      OBJECTIVE: See eval  Observation:   Test measurements:      RESTRICTIONS/PRECAUTIONS: none    Exercises/Interventions:     Therapeutic Ex (63857)   Min: 15 sets/sec reps notes   Hip Ext   bilat   Prone glute max activation 10 10 bilat   Standing hip flexion (straight leg)   L   Standing hip abduction   L , cues form   Front Plank      Side planks       Kneeling hip abd/ext      1/2 kneeling down chop      Std band pull down      SL hip abd/clam      Lateral band pull      Lateral band walk      Bosu Lunge      Slide lunge       Ham string stretch      Hip Flex stretch      Glute Stretch      Seated forward ball roll 1 10    Seated no money 2 10 Red; seated EOB   SLS Ball/wall glute      Manual Intervention (88263) Min: 20      DN      Prone PA 1 20    GISTM/STM      Lumbar Manip      SI Manip      Hip belt mobs      Hip LA distraction            NMR re-education (90395)   Min:      Mf Activation- re-ed      TrA Re-ed activation      Glute Max re-ed activation      Prone martín Baca            Therapeutic Activity (35608) Min:                                Therapeutic Exercise and NMR EXR  [x] (45975) Provided verbal/tactile cueing for activities related to strengthening, flexibility, endurance, ROM  for improvements in proximal hip and core control with self care, mobility, lifting and ambulation. [x] (88562) Provided verbal/tactile cueing for activities related to improving balance, coordination, kinesthetic sense, posture, motor skill, proprioception  to assist with core control in self care, mobility, lifting, and ambulation.      Therapeutic Activities:    [] (19252 or 56718) Provided verbal/tactile cueing for activities related to improving balance, coordination, kinesthetic sense, posture, motor skill, proprioception and motor activation to allow for proper function  with self care and ADLs  [] (95576) Provided training and instruction to the patient for proper core and proximal hip recruitment and positioning with ambulation re-education     Home Exercise Program:    [x] (48961) Reviewed/Progressed HEP activities related to strengthening, flexibility, endurance, ROM of core, proximal hip and LE for functional self-care, mobility, lifting and ambulation   [] (90458) Reviewed/Progressed HEP activities related to improving balance, coordination, kinesthetic sense, posture, motor skill, proprioception of core, proximal hip and LE for self care, mobility, lifting, and ambulation      Manual Treatments:  PROM / STM / Oscillations-Mobs:  G-I, II, III, IV (PA's, Inf., Post.)  [x] (35002) Provided manual therapy to mobilize proximal hip and LS spine soft tissue/joints for the purpose of modulating pain, promoting relaxation,  increasing ROM, reducing/eliminating soft tissue swelling/inflammation/restriction, improving soft tissue extensibility and allowing for proper ROM for normal function with self care, mobility, lifting and ambulation. Modalities:       Charges:  Timed Code Treatment Minutes: 35   Total Treatment Minutes: 35     [] EVAL (LOW) 01833 (typically 20 minutes face-to-face)  [] EVAL (MOD) 77233 (typically 30 minutes face-to-face)  [] EVAL (HIGH) 57521 (typically 45 minutes face-to-face)  [] RE-EVAL     [x] VI(95982) x   1  [] DRY NEEDLE 1 OR 2 MUSCLES  [] NMR (09381) x     [] DRY NEEDLE 3+ MUSCLES  [x] Manual (23276) x   1    [] TA (14951) x     [] Mech Traction (61693)  [] ES(attended) (10042)     [] ES (un) (76150):   [] VASO (49740)  [] Other:        GOALS:  Patient stated goal: improve walking and ability to do housework  [] Progressing: [] Met: [x] Not Met: [] Adjusted  Therapist goals for Patient:   Short Term Goals: To be achieved in: 2 weeks  1. Independent in HEP and progression per patient tolerance, in order to prevent re-injury. [] Progressing: [] Met: [x] Not Met: [] Adjusted  2. Patient will have a decrease in pain to facilitate improvement in movement, function, and ADLs as indicated by Functional Deficits. [] Progressing: [] Met: [x] Not Met: [] Adjusted    Long Term Goals: To be achieved in: 6 weeks  1. Disability index score of 30% or less for the PHILIP or 55 or better on Foto to assist with reaching prior level of function. [] Progressing: [] Met: [x] Not Met: [] Adjusted  2. Patient will demonstrate increased AROM to WNL, good LS mobility, good hip ROM to allow for proper joint functioning as indicated by patients Functional Deficits. [] Progressing: [] Met: [x] Not Met: [] Adjusted  3.  Patient will demonstrate an increase in Strength to good proximal hip and core activation to allow for proper functional mobility as indicated by patients Functional Deficits. [] Progressing: [] Met: [x] Not Met: [] Adjusted  4. Patient will return to standing and walking functional activities > 30 min without increased symptoms or restriction. [] Progressing: [] Met: [x] Not Met: [] Adjusted  5. Patient will report being able to perform housework with decreased symptoms or restriction, no greater than 2-3/10.(patient specific functional goal)    [] Progressing: [] Met: [x] Not Met: [] Adjusted     ASSESSMENT:  Patient continues with t/spine and l/spine restriction on R side. Good improvement with PA to area. Tolerated seated strengthening today and felt improved posture and decreased tightness end of session. Treatment/Activity Tolerance:  [x] Patient tolerated treatment well [] Patient limited by fatique  [] Patient limited by pain  [] Patient limited by other medical complications  [] Other:     Overall Progression Towards Functional goals/ Treatment Progress Update:  [] Patient is progressing as expected towards functional goals listed. [] Progression is slowed due to complexities/Impairments listed. [] Progression has been slowed due to co-morbidities. [x] Plan just implemented, too soon to assess goals progression <30days   [] Goals require adjustment due to lack of progress  [] Patient is not progressing as expected and requires additional follow up with physician  [] Other:    Prognosis for POC: [x] Good [] Fair  [] Poor    Patient requires continued skilled intervention: [x] Yes  [] No        PLAN: See eval  [] Continue per plan of care [] Alter current plan (see comments)  [x] Plan of care initiated [] Hold pending MD visit [] Discharge    Electronically signed by: Bebo Us, PT    Note: If patient does not return for scheduled/recommended follow up visits, this note will serve as a discharge from care along with the most recent update on progress.

## 2022-08-23 ENCOUNTER — HOSPITAL ENCOUNTER (OUTPATIENT)
Dept: PHYSICAL THERAPY | Age: 87
Setting detail: THERAPIES SERIES
Discharge: HOME | End: 2022-08-23
Payer: MEDICARE

## 2022-08-23 PROCEDURE — 97110 THERAPEUTIC EXERCISES: CPT

## 2022-08-23 PROCEDURE — 97140 MANUAL THERAPY 1/> REGIONS: CPT

## 2022-08-23 NOTE — FLOWSHEET NOTE
[address]  Phone: [phone]   Fax:     [phone]    Physical Therapy Treatment Note/ Progress Report:       Date:  2022    Patient Name:  Tien Cadena    :  1935  MRN: [id number]  Restrictions/Precautions:    Medical/Treatment Diagnosis Information:  Diagnosis: lumbar radiculopathy, back pain  Treatment Diagnosis: lumbar radiculopathy M54.16, back pain Q10.9  Insurance/Certification information:  PT Insurance Information: [employer] Medicare  Physician Information:  Lala Parker MD  Plan of care signed (Y/N):     Date of Patient follow up with Physician:      Progress Report: []  Yes  [x]  No     Date Range for reporting period:  Beginnin2022  Ending:     Progress report due (10 Rx/or 30 days whichever is less):      Recertification due (POC duration/ or 90 days whichever is less): 2022     Visit # Insurance Allowable Auth Needed   4 [employer] Medicare []Yes    []No     Pain level:  410   Functional Scale: Foto FS Score 40, PHILIP 49.2   Date Assessed: 2022    SUBJECTIVE:  Patient states she was has been very sore in her back. Only doing the butt squeeze exercise as this doesn't hurt. States some of the others have been bothersome. Reports that her L knee has been giving her quite a bit of an issue and thinks this may be irritating her back as well.       OBJECTIVE:   Observation: ambulating with SPC and decreased L knee flexion and stance time on L  Test measurements:      RESTRICTIONS/PRECAUTIONS: none    Exercises/Interventions:     Therapeutic Ex (25690)   Min: 15 sets/sec reps notes   Hip Ext   bilat   Prone glute max activation 10 15 bilat   Standing hip flexion (straight leg)   L   Standing hip abduction   L , cues form   TA activation in hooklying 10 10    TA activation + hooklying march 1 10 bilat   Kneeling hip abd/ext      1/2 kneeling down chop      Std band pull down      SL hip abd/clam      Lateral band pull      Lateral band walk      Bosu Lunge      Slide lunge       Ham string stretch      Hip Flex stretch      Glute Stretch      Seated forward ball roll 0     Seated no money 0  Red; seated EOB   SLS Ball/wall glute      Manual Intervention (47405) Min: 20      DN      Prone PA 1 20    GISTM/STM      Lumbar Manip      SI Manip      Hip belt mobs      Hip LA distraction            NMR re-education (57984)   Min:      Mf Activation- re-ed      TrA Re-ed activation      Glute Max re-ed activation      Prone martín Baca            Therapeutic Activity (54121) Min:                                Therapeutic Exercise and NMR EXR  [x] (56852) Provided verbal/tactile cueing for activities related to strengthening, flexibility, endurance, ROM  for improvements in proximal hip and core control with self care, mobility, lifting and ambulation. [x] (84928) Provided verbal/tactile cueing for activities related to improving balance, coordination, kinesthetic sense, posture, motor skill, proprioception  to assist with core control in self care, mobility, lifting, and ambulation.      Therapeutic Activities:    [] (79946 or 29023) Provided verbal/tactile cueing for activities related to improving balance, coordination, kinesthetic sense, posture, motor skill, proprioception and motor activation to allow for proper function  with self care and ADLs  [] (35866) Provided training and instruction to the patient for proper core and proximal hip recruitment and positioning with ambulation re-education     Home Exercise Program:    [x] (04270) Reviewed/Progressed HEP activities related to strengthening, flexibility, endurance, ROM of core, proximal hip and LE for functional self-care, mobility, lifting and ambulation   [] (38075) Reviewed/Progressed HEP activities related to improving balance, coordination, kinesthetic sense, posture, motor skill, proprioception of core, proximal hip and LE for self care, mobility, lifting, and ambulation      Manual Treatments:  PROM / STM / Oscillations-Mobs:  G-I, II, III, IV (PA's, Inf., Post.)  [x] (41492) Provided manual therapy to mobilize proximal hip and LS spine soft tissue/joints for the purpose of modulating pain, promoting relaxation,  increasing ROM, reducing/eliminating soft tissue swelling/inflammation/restriction, improving soft tissue extensibility and allowing for proper ROM for normal function with self care, mobility, lifting and ambulation. Modalities:       Charges:  Timed Code Treatment Minutes: 35   Total Treatment Minutes: 35     [] EVAL (LOW) 99822 (typically 20 minutes face-to-face)  [] EVAL (MOD) 61547 (typically 30 minutes face-to-face)  [] EVAL (HIGH) 60678 (typically 45 minutes face-to-face)  [] RE-EVAL     [x] IN(22268) x   1  [] DRY NEEDLE 1 OR 2 MUSCLES  [] NMR (70610) x     [] DRY NEEDLE 3+ MUSCLES  [x] Manual (02175) x   1    [] TA (34541) x     [] Mech Traction (50409)  [] ES(attended) (77122)     [] ES (un) (78118):   [] VASO (24609)  [] Other:        GOALS:  Patient stated goal: improve walking and ability to do housework  [] Progressing: [] Met: [x] Not Met: [] Adjusted  Therapist goals for Patient:   Short Term Goals: To be achieved in: 2 weeks  1. Independent in HEP and progression per patient tolerance, in order to prevent re-injury. [] Progressing: [] Met: [x] Not Met: [] Adjusted  2. Patient will have a decrease in pain to facilitate improvement in movement, function, and ADLs as indicated by Functional Deficits. [] Progressing: [] Met: [x] Not Met: [] Adjusted    Long Term Goals: To be achieved in: 6 weeks  1. Disability index score of 30% or less for the PHILIP or 55 or better on Foto to assist with reaching prior level of function. [] Progressing: [] Met: [x] Not Met: [] Adjusted  2.  Patient will demonstrate increased AROM to WNL, good LS mobility, good hip ROM to allow for proper joint functioning as indicated by patients Functional Deficits. [] Progressing: [] Met: [x] Not Met: [] Adjusted  3. Patient will demonstrate an increase in Strength to good proximal hip and core activation to allow for proper functional mobility as indicated by patients Functional Deficits. [] Progressing: [] Met: [x] Not Met: [] Adjusted  4. Patient will return to standing and walking functional activities > 30 min without increased symptoms or restriction. [] Progressing: [] Met: [x] Not Met: [] Adjusted  5. Patient will report being able to perform housework with decreased symptoms or restriction, no greater than 2-3/10.(patient specific functional goal)    [] Progressing: [] Met: [x] Not Met: [] Adjusted     ASSESSMENT:  Patient continues with t/spine and l/spine restriction on R side. Good improvement with PA to area. Did well with glute activation and working on light core activation in supine position. Treatment/Activity Tolerance:  [x] Patient tolerated treatment well [] Patient limited by fatique  [] Patient limited by pain  [] Patient limited by other medical complications  [] Other:     Overall Progression Towards Functional goals/ Treatment Progress Update:  [] Patient is progressing as expected towards functional goals listed. [] Progression is slowed due to complexities/Impairments listed. [] Progression has been slowed due to co-morbidities.   [x] Plan just implemented, too soon to assess goals progression <30days   [] Goals require adjustment due to lack of progress  [] Patient is not progressing as expected and requires additional follow up with physician  [] Other:    Prognosis for POC: [x] Good [] Fair  [] Poor    Patient requires continued skilled intervention: [x] Yes  [] No        PLAN: See eval  [] Continue per plan of care [] Alter current plan (see comments)  [x] Plan of care initiated [] Hold pending MD visit [] Discharge    Electronically signed by: Josie Goss PT    Note: If

## 2022-08-25 ENCOUNTER — HOSPITAL ENCOUNTER (OUTPATIENT)
Dept: PHYSICAL THERAPY | Age: 87
Setting detail: THERAPIES SERIES
Discharge: HOME | End: 2022-08-25
Payer: MEDICARE

## 2022-08-25 PROCEDURE — 97140 MANUAL THERAPY 1/> REGIONS: CPT

## 2022-08-25 PROCEDURE — 97110 THERAPEUTIC EXERCISES: CPT

## 2022-08-25 NOTE — FLOWSHEET NOTE
[address]  Phone: [phone]   Fax:     [phone]    Physical Therapy Treatment Note/ Progress Report:       Date:  2022    Patient Name:  Janell Durant    :  1935  MRN: [id number]  Restrictions/Precautions:    Medical/Treatment Diagnosis Information:  Diagnosis: lumbar radiculopathy, back pain  Treatment Diagnosis: lumbar radiculopathy M54.16, back pain Q55.1  Insurance/Certification information:  PT Insurance Information: [employer] Medicare  Physician Information:  Duglas Bailey MD  Plan of care signed (Y/N):     Date of Patient follow up with Physician:      Progress Report: []  Yes  [x]  No     Date Range for reporting period:  Beginnin2022  Ending:     Progress report due (10 Rx/or 30 days whichever is less):      Recertification due (POC duration/ or 90 days whichever is less): 2022     Visit # Insurance Allowable Auth Needed   5 [employer] Medicare []Yes    []No     Pain level:  410   Functional Scale: Foto FS Score 40, PHILIP 49.2   Date Assessed: 2022    SUBJECTIVE:  Patient states she is less sore in her back compared to last session. States that she is still sore across her low back with prolonged standing. Notes that she hasn't been having as much spasm up into her R side of her upper back.       OBJECTIVE:   Observation: ambulating with SPC and decreased L knee flexion and stance time on L  Test measurements:      RESTRICTIONS/PRECAUTIONS: none    Exercises/Interventions:     Therapeutic Ex (51186)   Min: 25 sets/sec reps notes   Hip Ext-POT 1 10 bilat   Prone glute max activation 10 15 bilat   Standing hip flexion (straight leg)   L   Standing hip abduction   L , cues form   TA activation in hooklying 10 10    TA activation + hooklying  10 bilat   LTR 1 20    Bridge 3sec 10 Very small lift   Std band pull down      SL hip abd/clam      Lateral band pull Lateral band walk      Bosu Lunge      Slide lunge       Ham string stretch      Hip Flex stretch      Glute Stretch      Seated forward ball roll 0     Seated no money 0  Red; seated EOB   SLS Ball/wall glute      Manual Intervention (06277) Min: 20      DN      Prone PA 1 20    GISTM/STM      Lumbar Manip      SI Manip      Hip belt mobs      Hip LA distraction            NMR re-education (84070)   Min:      Mf Activation- re-ed      TrA Re-ed activation      Glute Max re-ed activation      Prone martín Baca            Therapeutic Activity (66858) Min:                                Therapeutic Exercise and NMR EXR  [x] (71313) Provided verbal/tactile cueing for activities related to strengthening, flexibility, endurance, ROM  for improvements in proximal hip and core control with self care, mobility, lifting and ambulation. [x] (23894) Provided verbal/tactile cueing for activities related to improving balance, coordination, kinesthetic sense, posture, motor skill, proprioception  to assist with core control in self care, mobility, lifting, and ambulation.      Therapeutic Activities:    [] (12049 or 65539) Provided verbal/tactile cueing for activities related to improving balance, coordination, kinesthetic sense, posture, motor skill, proprioception and motor activation to allow for proper function  with self care and ADLs  [] (48880) Provided training and instruction to the patient for proper core and proximal hip recruitment and positioning with ambulation re-education     Home Exercise Program:    [x] (33693) Reviewed/Progressed HEP activities related to strengthening, flexibility, endurance, ROM of core, proximal hip and LE for functional self-care, mobility, lifting and ambulation   [] (47678) Reviewed/Progressed HEP activities related to improving balance, coordination, kinesthetic sense, posture, motor skill, proprioception of core, proximal hip and LE for self care, mobility, lifting, and ambulation      Manual Treatments:  PROM / STM / Oscillations-Mobs:  G-I, II, III, IV (PA's, Inf., Post.)  [x] (12803) Provided manual therapy to mobilize proximal hip and LS spine soft tissue/joints for the purpose of modulating pain, promoting relaxation,  increasing ROM, reducing/eliminating soft tissue swelling/inflammation/restriction, improving soft tissue extensibility and allowing for proper ROM for normal function with self care, mobility, lifting and ambulation. Modalities:       Charges:  Timed Code Treatment Minutes: 45   Total Treatment Minutes: 45     [] EVAL (LOW) 12476 (typically 20 minutes face-to-face)  [] EVAL (MOD) 50526 (typically 30 minutes face-to-face)  [] EVAL (HIGH) 06313 (typically 45 minutes face-to-face)  [] RE-EVAL     [x] UP(97985) x   2  [] DRY NEEDLE 1 OR 2 MUSCLES  [] NMR (34156) x     [] DRY NEEDLE 3+ MUSCLES  [x] Manual (70987) x   1    [] TA (35014) x     [] Mech Traction (26243)  [] ES(attended) (94933)     [] ES (un) (89192):   [] VASO (09607)  [] Other:        GOALS:  Patient stated goal: improve walking and ability to do housework  [] Progressing: [] Met: [x] Not Met: [] Adjusted  Therapist goals for Patient:   Short Term Goals: To be achieved in: 2 weeks  1. Independent in HEP and progression per patient tolerance, in order to prevent re-injury. [] Progressing: [] Met: [x] Not Met: [] Adjusted  2. Patient will have a decrease in pain to facilitate improvement in movement, function, and ADLs as indicated by Functional Deficits. [] Progressing: [] Met: [x] Not Met: [] Adjusted    Long Term Goals: To be achieved in: 6 weeks  1. Disability index score of 30% or less for the PHILIP or 55 or better on Foto to assist with reaching prior level of function. [] Progressing: [] Met: [x] Not Met: [] Adjusted  2. Patient will demonstrate increased AROM to WNL, good LS mobility, good hip ROM to allow for proper joint functioning as indicated by patients Functional Deficits.    [] Progressing: [] Met: [x] Not Met: [] Adjusted  3. Patient will demonstrate an increase in Strength to good proximal hip and core activation to allow for proper functional mobility as indicated by patients Functional Deficits. [] Progressing: [] Met: [x] Not Met: [] Adjusted  4. Patient will return to standing and walking functional activities > 30 min without increased symptoms or restriction. [] Progressing: [] Met: [x] Not Met: [] Adjusted  5. Patient will report being able to perform housework with decreased symptoms or restriction, no greater than 2-3/10.(patient specific functional goal)    [] Progressing: [] Met: [x] Not Met: [] Adjusted     ASSESSMENT:  Patient continues with t/spine and l/spine restriction on R side. Tender bilaterally at L4/5 and L5/S1. Good improvement with PA to area. Did well with glute activation and working on light core activation in supine position. Added light very small activation of glute for bridging with increased stiffness in back- but not pain. Further challenged glutes. Treatment/Activity Tolerance:  [x] Patient tolerated treatment well [] Patient limited by fatique  [] Patient limited by pain  [] Patient limited by other medical complications  [] Other:     Overall Progression Towards Functional goals/ Treatment Progress Update:  [] Patient is progressing as expected towards functional goals listed. [] Progression is slowed due to complexities/Impairments listed. [] Progression has been slowed due to co-morbidities.   [x] Plan just implemented, too soon to assess goals progression <30days   [] Goals require adjustment due to lack of progress  [] Patient is not progressing as expected and requires additional follow up with physician  [] Other:    Prognosis for POC: [x] Good [] Fair  [] Poor    Patient requires continued skilled intervention: [x] Yes  [] No        PLAN: See eval  [] Continue per plan of care [] Alter current plan (see comments)  [x] Plan of care

## 2022-08-30 ENCOUNTER — HOSPITAL ENCOUNTER (OUTPATIENT)
Dept: PHYSICAL THERAPY | Age: 87
Setting detail: THERAPIES SERIES
Discharge: HOME | End: 2022-08-30
Payer: MEDICARE

## 2022-08-30 PROCEDURE — 97110 THERAPEUTIC EXERCISES: CPT

## 2022-08-30 PROCEDURE — 97140 MANUAL THERAPY 1/> REGIONS: CPT

## 2022-08-30 NOTE — FLOWSHEET NOTE
[address]  Phone: [phone]   Fax:     [phone]    Physical Therapy Treatment Note/ Progress Report:       Date:  2022    Patient Name:  Angel Quiroz    :  1935  MRN: [id number]  Restrictions/Precautions:    Medical/Treatment Diagnosis Information:  Diagnosis: lumbar radiculopathy, back pain  Treatment Diagnosis: lumbar radiculopathy M54.16, back pain W93.5  Insurance/Certification information:  PT Insurance Information: [employer] Medicare  Physician Information:  Jose Pyle MD  Plan of care signed (Y/N):     Date of Patient follow up with Physician: 2022     Progress Report: []  Yes  [x]  No     Date Range for reporting period:  Beginnin2022  Ending:     Progress report due (10 Rx/or 30 days whichever is less): 3/30/5522     Recertification due (POC duration/ or 90 days whichever is less): 2022     Visit # Insurance Allowable Auth Needed   6 [employer] Medicare []Yes    []No     Pain level:  410   Functional Scale: Foto FS Score 40, PHILIP 49.2   Date Assessed: 2022    SUBJECTIVE:  Patient states she is less sore in her back compared to last session. Is able to stand to be able to do dishes now without having the spasms in her R upper back. States that she isn't using her lidocaine patches as much.        OBJECTIVE:   Observation: ambulating with SPC and decreased L knee flexion and stance time on L  Test measurements:      RESTRICTIONS/PRECAUTIONS: none    Exercises/Interventions:     Therapeutic Ex (67474)   Min: 25 sets/sec reps notes   Hip Ext-POT 1 10 bilat   Prone glute max activation 0  bilat   SLR bilat 1 10 bilat   Standing hip flexion march 2 10 bilat   Standing hip abduction 1 10 L , cues form   TA activation in hooklying 10 10    TA activation + hooklying  10 bilat   LTR 1 20    Bridge 3sec 10 Very small lift         SL hip abd/clam      Lateral band pull      Lateral band walk      Bosu Lunge      Slide lunge       Ham string stretch      Hip Flex stretch      Glute Stretch      Seated forward ball roll 0     Seated no money 0  Red; seated EOB   SLS Ball/wall glute      Manual Intervention (86992) Min: 20      DN      Prone PA 1 7    GISTM/STM 1 6 R lumbar and thoracic paraspinals   R hip PROM 1 4'    L knee mobs 1 3'    Hip belt mobs      Hip LA distraction            NMR re-education (82749)   Min:      Mf Activation- re-ed      TrA Re-ed activation      Glute Max re-ed activation      Prone martín Baca            Therapeutic Activity (43292) Min:                                Therapeutic Exercise and NMR EXR  [x] (50407) Provided verbal/tactile cueing for activities related to strengthening, flexibility, endurance, ROM  for improvements in proximal hip and core control with self care, mobility, lifting and ambulation. [x] (98841) Provided verbal/tactile cueing for activities related to improving balance, coordination, kinesthetic sense, posture, motor skill, proprioception  to assist with core control in self care, mobility, lifting, and ambulation.      Therapeutic Activities:    [] (90305 or 47539) Provided verbal/tactile cueing for activities related to improving balance, coordination, kinesthetic sense, posture, motor skill, proprioception and motor activation to allow for proper function  with self care and ADLs  [] (95117) Provided training and instruction to the patient for proper core and proximal hip recruitment and positioning with ambulation re-education     Home Exercise Program:    [x] (39964) Reviewed/Progressed HEP activities related to strengthening, flexibility, endurance, ROM of core, proximal hip and LE for functional self-care, mobility, lifting and ambulation   [] (15063) Reviewed/Progressed HEP activities related to improving balance, coordination, kinesthetic sense, posture, motor skill, proprioception of core, proximal hip and LE for self care, mobility, lifting, and ambulation      Manual Treatments:  PROM / STM / Oscillations-Mobs:  G-I, II, III, IV (PA's, Inf., Post.)  [x] (94204) Provided manual therapy to mobilize proximal hip and LS spine soft tissue/joints for the purpose of modulating pain, promoting relaxation,  increasing ROM, reducing/eliminating soft tissue swelling/inflammation/restriction, improving soft tissue extensibility and allowing for proper ROM for normal function with self care, mobility, lifting and ambulation. Modalities:       Charges:  Timed Code Treatment Minutes: 45   Total Treatment Minutes: 45     [] EVAL (LOW) 14055 (typically 20 minutes face-to-face)  [] EVAL (MOD) 45170 (typically 30 minutes face-to-face)  [] EVAL (HIGH) 83366 (typically 45 minutes face-to-face)  [] RE-EVAL     [x] IA(80567) x   2  [] DRY NEEDLE 1 OR 2 MUSCLES  [] NMR (65661) x     [] DRY NEEDLE 3+ MUSCLES  [x] Manual (14573) x   1    [] TA (93067) x     [] Mech Traction (49856)  [] ES(attended) (85067)     [] ES (un) (41150):   [] VASO (00359)  [] Other:        GOALS:  Patient stated goal: improve walking and ability to do housework  [] Progressing: [] Met: [x] Not Met: [] Adjusted  Therapist goals for Patient:   Short Term Goals: To be achieved in: 2 weeks  1. Independent in HEP and progression per patient tolerance, in order to prevent re-injury. [] Progressing: [] Met: [x] Not Met: [] Adjusted  2. Patient will have a decrease in pain to facilitate improvement in movement, function, and ADLs as indicated by Functional Deficits. [] Progressing: [] Met: [x] Not Met: [] Adjusted    Long Term Goals: To be achieved in: 6 weeks  1. Disability index score of 30% or less for the PHILIP or 55 or better on Foto to assist with reaching prior level of function. [] Progressing: [] Met: [x] Not Met: [] Adjusted  2.  Patient will demonstrate increased AROM to WNL, good LS mobility, good hip ROM to allow for proper joint functioning as indicated by patients Functional Deficits. [] Progressing: [] Met: [x] Not Met: [] Adjusted  3. Patient will demonstrate an increase in Strength to good proximal hip and core activation to allow for proper functional mobility as indicated by patients Functional Deficits. [] Progressing: [] Met: [x] Not Met: [] Adjusted  4. Patient will return to standing and walking functional activities > 30 min without increased symptoms or restriction. [] Progressing: [] Met: [x] Not Met: [] Adjusted  5. Patient will report being able to perform housework with decreased symptoms or restriction, no greater than 2-3/10.(patient specific functional goal)    [] Progressing: [] Met: [x] Not Met: [] Adjusted     ASSESSMENT:  Patient with lessening t/spine and l/spine restriction on R side. Tender bilaterally at L4/5 and L5/S1. Good improvement with PA to area. Did well with glute activation and working on light core activation in supine position. Challenged with L proximal hip strengthening. Fatigued at conclusion. Treatment/Activity Tolerance:  [x] Patient tolerated treatment well [] Patient limited by fatique  [] Patient limited by pain  [] Patient limited by other medical complications  [] Other:     Overall Progression Towards Functional goals/ Treatment Progress Update:  [] Patient is progressing as expected towards functional goals listed. [] Progression is slowed due to complexities/Impairments listed. [] Progression has been slowed due to co-morbidities.   [x] Plan just implemented, too soon to assess goals progression <30days   [] Goals require adjustment due to lack of progress  [] Patient is not progressing as expected and requires additional follow up with physician  [] Other:    Prognosis for POC: [x] Good [] Fair  [] Poor    Patient requires continued skilled intervention: [x] Yes  [] No        PLAN: See eval  [] Continue per plan of care [] Alter current plan (see comments)  [x] Plan of care initiated [] Hold pending MD visit [] Discharge    Electronically signed by: Bobo Colmenares, PT    Note: If patient does not return for scheduled/recommended follow up visits, this note will serve as a discharge from care along with the most recent update on progress.

## 2022-09-01 ENCOUNTER — HOSPITAL ENCOUNTER (OUTPATIENT)
Dept: PHYSICAL THERAPY | Age: 87
Setting detail: THERAPIES SERIES
Discharge: HOME | End: 2022-09-01
Payer: MEDICARE

## 2022-09-01 PROCEDURE — 97140 MANUAL THERAPY 1/> REGIONS: CPT

## 2022-09-01 PROCEDURE — 97110 THERAPEUTIC EXERCISES: CPT

## 2022-09-01 NOTE — PLAN OF CARE
[address]  Phone: [phone]   Fax:     [phone]        Physical Therapy Re-Certification Plan of [facility]      Dear Dr José Miguel Paz  ,    We had the pleasure of treating the following patient for physical therapy services at [address] [place] [address]. A summary of our findings can be found in the updated assessment below. This includes our plan of care. If you have any questions or concerns regarding these findings, please do not hesitate to contact me at the office phone number checked above.   Thank you for the referral.     Physician Signature:________________________________Date:__________________  By signing above (or electronic signature), therapists plan is approved by physician    Date Range Of Visits: 2022 thru 2022  Total Visits to Date: 7  Overall Response to Treatment:   [x]Patient is responding well to treatment and improvement is noted with regards  to goals   []Patient should continue to improve in reasonable time if they continue HEP   []Patient has plateaued and is no longer responding to skilled PT intervention    []Patient is getting worse and would benefit from return to referring MD   []Patient unable to adhere to initial POC   []Other:      Physical Therapy Treatment Note/ Progress Report:       Date:  2022    Patient Name:  Akila Rizvi    :  1935  MRN: [id number]  Restrictions/Precautions:    Medical/Treatment Diagnosis Information:  Diagnosis: lumbar radiculopathy, back pain  Treatment Diagnosis: lumbar radiculopathy M54.16, back pain U04.6  Insurance/Certification information:  PT Insurance Information: [employer] Medicare  Physician Information:  Cali Merino MD  Plan of care signed (Y/N):     Date of Patient follow up with Physician: 2022     Progress Report: []  Yes  [x]  No     Date Range for reporting period:  Beginning: 8/11/2022  Ending:     Progress report due (10 Rx/or 30 days whichever is less): 8/21/7355     Recertification due (POC duration/ or 90 days whichever is less): 9/25/2022     Visit # Insurance Allowable Auth Needed   7 [employer] Medicare []Yes    []No     Pain level:  410   Functional Scale: Foto FS Score 56, PHILIP 23.7%   Date Assessed: 9/1/2022    SUBJECTIVE:  Patient states she is less sore in her back compared to last session. Her L knee is bothering her more today than last session. Reports that she can stand for 30 minutes pretty consistently. Occasionally can stand for an hour without issue. Is able to stand to be able to do dishes now without having the spasms in her R upper back. States that she isn't using her lidocaine patches as much. Feeling about 70% improved most of the time. Does have some days when she feels like she slides back in progress.        OBJECTIVE:   Observation: ambulating with SPC and decreased L knee flexion and stance time on L  Test measurements:       Comments   Lumbar Flex 60     Lumbar Ext 20        ROM LEFT RIGHT Comments   Lumbar Side Bend 15 15 Stretch on R   Lumbar Rotation         Quadrant         Hip Flexion WNL WNL    Hip Abd         Hip ER Mild limitation Mild limitation     Hip IR Mild limitation Mild limitation     Hip Extension         Knee Extension         Knee Flex         Hamstring Flex         Piriformis         Choco test                       Myotomes/Strength Normal Abnormal Comments   [x]ALL NORMAL         Hip Abd     L 3/5   Hip Ext     L 3/5   Hip flexion (L1-L2 femoral) []  []  L 3/5       RESTRICTIONS/PRECAUTIONS: none    Exercises/Interventions:     Therapeutic Ex (75522)   Min: 25 sets/sec reps notes   Hip Ext-POT 1 10 bilat   Prone glute max activation 0  bilat   SLR bilat 1 10 bilat   Standing hip flexion marches 2 10 bilat   Standing hip abduction 1 10 L , cues form   TA activation in hooklying 10 10    TA activation + hooklying march 1 10 bilat   LTR 1 20 Bridge 3sec 10 Very small lift         SL hip abd/clam      Lateral band pull      Lateral band walk      Bosu Lunge      Slide lunge       Ham string stretch      Hip Flex stretch      Glute Stretch      Seated forward ball roll 0     Seated no money 0  Red; seated EOB   SLS Ball/wall glute      Manual Intervention (72689) Min: 20      DN      Prone PA 1 7    GISTM/STM 1 6 R lumbar and thoracic paraspinals   R hip PROM 1 4'    L knee mobs 1 3'    Hip belt mobs      Hip LA distraction            NMR re-education (71680)   Min:      Mf Activation- re-ed      TrA Re-ed activation      Glute Max re-ed activation      Prone matrín Baca            Therapeutic Activity (76115) Min:                                Therapeutic Exercise and NMR EXR  [x] (91733) Provided verbal/tactile cueing for activities related to strengthening, flexibility, endurance, ROM  for improvements in proximal hip and core control with self care, mobility, lifting and ambulation. [x] (90208) Provided verbal/tactile cueing for activities related to improving balance, coordination, kinesthetic sense, posture, motor skill, proprioception  to assist with core control in self care, mobility, lifting, and ambulation.      Therapeutic Activities:    [] (23926 or 34907) Provided verbal/tactile cueing for activities related to improving balance, coordination, kinesthetic sense, posture, motor skill, proprioception and motor activation to allow for proper function  with self care and ADLs  [] (96488) Provided training and instruction to the patient for proper core and proximal hip recruitment and positioning with ambulation re-education     Home Exercise Program:    [x] (36418) Reviewed/Progressed HEP activities related to strengthening, flexibility, endurance, ROM of core, proximal hip and LE for functional self-care, mobility, lifting and ambulation   [] (84049) Reviewed/Progressed HEP activities related to improving balance, coordination, kinesthetic sense, posture, motor skill, proprioception of core, proximal hip and LE for self care, mobility, lifting, and ambulation      Manual Treatments:  PROM / STM / Oscillations-Mobs:  G-I, II, III, IV (PA's, Inf., Post.)  [x] (21003) Provided manual therapy to mobilize proximal hip and LS spine soft tissue/joints for the purpose of modulating pain, promoting relaxation,  increasing ROM, reducing/eliminating soft tissue swelling/inflammation/restriction, improving soft tissue extensibility and allowing for proper ROM for normal function with self care, mobility, lifting and ambulation. Modalities:       Charges:  Timed Code Treatment Minutes: 45   Total Treatment Minutes: 45     [] EVAL (LOW) 68852 (typically 20 minutes face-to-face)  [] EVAL (MOD) 35549 (typically 30 minutes face-to-face)  [] EVAL (HIGH) 19144 (typically 45 minutes face-to-face)  [] RE-EVAL     [x] DN(48594) x   2  [] DRY NEEDLE 1 OR 2 MUSCLES  [] NMR (89981) x     [] DRY NEEDLE 3+ MUSCLES  [x] Manual (83452) x   1    [] TA (14160) x     [] Mech Traction (74961)  [] ES(attended) (11935)     [] ES (un) (91594):   [] VASO (40452)  [] Other:        GOALS:  Patient stated goal: improve walking and ability to do housework  [x] Progressing: [] Met: [] Not Met: [] Adjusted  Therapist goals for Patient:   Short Term Goals: To be achieved in: 2 weeks  1. Independent in HEP and progression per patient tolerance, in order to prevent re-injury. [] Progressing: [x] Met: [] Not Met: [] Adjusted  2. Patient will have a decrease in pain to facilitate improvement in movement, function, and ADLs as indicated by Functional Deficits. [] Progressing: [x] Met: [] Not Met: [] Adjusted    Long Term Goals: To be achieved in: 6 weeks  1. Disability index score of 30% or less for the PHILIP or 55 or better on Foto to assist with reaching prior level of function. [] Progressing: [x] Met: [] Not Met: [] Adjusted  2.  Patient will demonstrate increased AROM to WNL, good LS mobility, good hip ROM to allow for proper joint functioning as indicated by patients Functional Deficits. [x] Progressing: [] Met: [] Not Met: [] Adjusted  3. Patient will demonstrate an increase in Strength to good proximal hip and core activation to allow for proper functional mobility as indicated by patients Functional Deficits. [x] Progressing: [] Met: [] Not Met: [] Adjusted  4. Patient will return to standing and walking functional activities > 30 min without increased symptoms or restriction. [] Progressing: [x] Met: [] Not Met: [] Adjusted  5. Patient will report being able to perform housework with decreased symptoms or restriction, no greater than 2-3/10.(patient specific functional goal)    [x] Progressing: [] Met: [] Not Met: [] Adjusted     ASSESSMENT:  Patient has been seen for 7 visits of physical therapy to address low back pain. Patient has been making good steady improvement in overall pain and ability to perform functional mobility with lessening discomfort. Patient would still benefit from further skilled PT services to address core and hip strength, as well as ROM and to further improve lumbar mobility. Will have follow up with Dr Fariba Guillen next week. Treatment/Activity Tolerance:  [x] Patient tolerated treatment well [] Patient limited by fatique  [] Patient limited by pain  [] Patient limited by other medical complications  [] Other:     Overall Progression Towards Functional goals/ Treatment Progress Update:  [x] Patient is progressing as expected towards functional goals listed. [] Progression is slowed due to complexities/Impairments listed. [] Progression has been slowed due to co-morbidities.   [] Plan just implemented, too soon to assess goals progression <30days   [] Goals require adjustment due to lack of progress  [] Patient is not progressing as expected and requires additional follow up with physician  [] Other:    Prognosis for POC: [x] Good [] Fair  []

## 2022-09-06 ENCOUNTER — NURSE ONLY (OUTPATIENT)
Dept: CARDIOLOGY CLINIC | Age: 87
End: 2022-09-06
Payer: MEDICARE

## 2022-09-06 DIAGNOSIS — Z45.09 ENCOUNTER FOR ELECTRONIC ANALYSIS OF REVEAL EVENT RECORDER: ICD-10-CM

## 2022-09-06 DIAGNOSIS — I48.91 ATRIAL FIBRILLATION WITH RAPID VENTRICULAR RESPONSE (HCC): Primary | ICD-10-CM

## 2022-09-06 PROCEDURE — 93298 REM INTERROG DEV EVAL SCRMS: CPT | Performed by: INTERNAL MEDICINE

## 2022-09-06 PROCEDURE — G2066 INTER DEVC REMOTE 30D: HCPCS | Performed by: INTERNAL MEDICINE

## 2022-09-06 NOTE — PROGRESS NOTES
We received a remote transmission from patient's monitor at home. Remote Linq report shows short SVT. Pt showed no symptoms. EP physician to review. We will continue to monitor remotely. Implanted for stroke. Pt is on Xarelto. End of 31-day monitoring period 9-6-22.

## 2022-09-08 ENCOUNTER — HOSPITAL ENCOUNTER (OUTPATIENT)
Dept: PHYSICAL THERAPY | Age: 87
Setting detail: THERAPIES SERIES
Discharge: HOME | End: 2022-09-08
Payer: MEDICARE

## 2022-09-08 PROCEDURE — 97110 THERAPEUTIC EXERCISES: CPT

## 2022-09-08 PROCEDURE — 97140 MANUAL THERAPY 1/> REGIONS: CPT

## 2022-09-08 NOTE — FLOWSHEET NOTE
[address]  Phone: [phone]   Fax:     [phone]            Physical Therapy Treatment Note/ Progress Report:       Date:  2022    Patient Name:  Deepak Calderón    :  1935  MRN: [id number]  Restrictions/Precautions:    Medical/Treatment Diagnosis Information:  Diagnosis: lumbar radiculopathy, back pain  Treatment Diagnosis: lumbar radiculopathy M54.16, back pain I18.4  Insurance/Certification information:  PT Insurance Information: [employer] Medicare  Physician Information:  Bennie Sheppard MD  Plan of care signed (Y/N):     Date of Patient follow up with Physician: 2022     Progress Report: []  Yes  [x]  No     Date Range for reporting period:  Beginnin2022  Ending:     Progress report due (10 Rx/or 30 days whichever is less): 3/71/9755     Recertification due (POC duration/ or 90 days whichever is less): 2022     Visit # Insurance Allowable Auth Needed   8 [employer] Medicare []Yes    []No     Pain level:  410   Functional Scale: Foto FS Score 56, PHILIP 23.7%   Date Assessed: 2022    SUBJECTIVE:  Patient states she is better able to stand and do dishes than before she started PT. Her stamina is doing some better since starting some iron treatments since July.       OBJECTIVE:   Observation: ambulating with SPC and decreased L knee flexion and stance time on L  Test measurements:       Comments   Lumbar Flex 60     Lumbar Ext 20        ROM LEFT RIGHT Comments   Lumbar Side Bend 15 15 Stretch on R   Lumbar Rotation         Quadrant         Hip Flexion WNL WNL    Hip Abd         Hip ER Mild limitation Mild limitation     Hip IR Mild limitation Mild limitation     Hip Extension         Knee Extension         Knee Flex         Hamstring Flex         Piriformis         Choco test                       Myotomes/Strength Normal Abnormal Comments   [x]ALL NORMAL         Hip Abd     L 3/5   Hip Ext     L 3/5   Hip flexion (L1-L2 femoral) []  []  L 3/5       RESTRICTIONS/PRECAUTIONS: none    Exercises/Interventions:     Therapeutic Ex (80502)   Min: 25 sets/sec reps notes   Hip Ext-POT 1 10 bilat   Prone glute max activation 0  bilat   SLR bilat 1 10 bilat   Standing hip flexion marches 2 10 bilat   Standing hip abduction 1 10 L , cues form   TA activation in hooklying 10 10    TA activation + hooklying march 1 10 bilat   LTR 1 20    Bridge 3sec 10 Very small lift         SL hip abd/clam      Lateral band pull      Lateral band walk      Bosu Lunge      Slide lunge       Ham string stretch      Hip Flex stretch      Glute Stretch      Seated forward ball roll 0     Seated no money 0  Red; seated EOB   SLS Ball/wall glute      Manual Intervention (43533) Min: 20      DN      Prone PA 1 7    GISTM/STM 1 6 R lumbar and thoracic paraspinals   R hip PROM 1 4'    L knee mobs 1 3'    Hip belt mobs      Hip LA distraction            NMR re-education (47642)   Min:      Mf Activation- re-ed      TrA Re-ed activation      Glute Max re-ed activation      Prone froggers      [facility]            Therapeutic Activity (09625) Min:                                Therapeutic Exercise and NMR EXR  [x] (16590) Provided verbal/tactile cueing for activities related to strengthening, flexibility, endurance, ROM  for improvements in proximal hip and core control with self care, mobility, lifting and ambulation. [x] (26124) Provided verbal/tactile cueing for activities related to improving balance, coordination, kinesthetic sense, posture, motor skill, proprioception  to assist with core control in self care, mobility, lifting, and ambulation.      Therapeutic Activities:    [] (39670 or 81142) Provided verbal/tactile cueing for activities related to improving balance, coordination, kinesthetic sense, posture, motor skill, proprioception and motor activation to allow for proper function  with self care and ADLs  [] (23218) Provided training and instruction to the patient for proper core and proximal hip recruitment and positioning with ambulation re-education     Home Exercise Program:    [x] (39406) Reviewed/Progressed HEP activities related to strengthening, flexibility, endurance, ROM of core, proximal hip and LE for functional self-care, mobility, lifting and ambulation   [] (01779) Reviewed/Progressed HEP activities related to improving balance, coordination, kinesthetic sense, posture, motor skill, proprioception of core, proximal hip and LE for self care, mobility, lifting, and ambulation      Manual Treatments:  PROM / STM / Oscillations-Mobs:  G-I, II, III, IV (PA's, Inf., Post.)  [x] (49366) Provided manual therapy to mobilize proximal hip and LS spine soft tissue/joints for the purpose of modulating pain, promoting relaxation,  increasing ROM, reducing/eliminating soft tissue swelling/inflammation/restriction, improving soft tissue extensibility and allowing for proper ROM for normal function with self care, mobility, lifting and ambulation. Modalities:       Charges:  Timed Code Treatment Minutes: 45   Total Treatment Minutes: 45     [] EVAL (LOW) 98649 (typically 20 minutes face-to-face)  [] EVAL (MOD) 51730 (typically 30 minutes face-to-face)  [] EVAL (HIGH) 53752 (typically 45 minutes face-to-face)  [] RE-EVAL     [x] CK(66304) x   2  [] DRY NEEDLE 1 OR 2 MUSCLES  [] NMR (53681) x     [] DRY NEEDLE 3+ MUSCLES  [x] Manual (07312) x   1    [] TA (58279) x     [] Mech Traction (52050)  [] ES(attended) (46055)     [] ES (un) (31425):   [] VASO (72545)  [] Other:        GOALS:  Patient stated goal: improve walking and ability to do housework  [x] Progressing: [] Met: [] Not Met: [] Adjusted  Therapist goals for Patient:   Short Term Goals: To be achieved in: 2 weeks  1. Independent in HEP and progression per patient tolerance, in order to prevent re-injury. [] Progressing: [x] Met: [] Not Met: [] Adjusted  2.  Patient will have a decrease in pain to facilitate improvement in movement, function, and ADLs as indicated by Functional Deficits. [] Progressing: [x] Met: [] Not Met: [] Adjusted    Long Term Goals: To be achieved in: 6 weeks  1. Disability index score of 30% or less for the PHILIP or 55 or better on Foto to assist with reaching prior level of function. [] Progressing: [x] Met: [] Not Met: [] Adjusted  2. Patient will demonstrate increased AROM to WNL, good LS mobility, good hip ROM to allow for proper joint functioning as indicated by patients Functional Deficits. [x] Progressing: [] Met: [] Not Met: [] Adjusted  3. Patient will demonstrate an increase in Strength to good proximal hip and core activation to allow for proper functional mobility as indicated by patients Functional Deficits. [x] Progressing: [] Met: [] Not Met: [] Adjusted  4. Patient will return to standing and walking functional activities > 30 min without increased symptoms or restriction. [] Progressing: [x] Met: [] Not Met: [] Adjusted  5. Patient will report being able to perform housework with decreased symptoms or restriction, no greater than 2-3/10.(patient specific functional goal)    [x] Progressing: [] Met: [] Not Met: [] Adjusted     ASSESSMENT:  Good tolerance to current treatment. Patient has been making good steady improvement in overall pain and ability to perform functional mobility with lessening discomfort. Patient would still benefit from further skilled PT services to address core and hip strength, as well as ROM and to further improve lumbar mobility. Will have follow up with Dr Betty Cullen next week.      Treatment/Activity Tolerance:  [x] Patient tolerated treatment well [] Patient limited by fatique  [] Patient limited by pain  [] Patient limited by other medical complications  [] Other:     Overall Progression Towards Functional goals/ Treatment Progress Update:  [x] Patient is progressing as expected towards functional goals listed. [] Progression is slowed due to complexities/Impairments listed. [] Progression has been slowed due to co-morbidities. [] Plan just implemented, too soon to assess goals progression <30days   [] Goals require adjustment due to lack of progress  [] Patient is not progressing as expected and requires additional follow up with physician  [] Other:    Prognosis for POC: [x] Good [] Fair  [] Poor    Patient requires continued skilled intervention: [x] Yes  [] No        PLAN:  [x] Continue per plan of care [] Alter current plan (see comments)  [] Plan of care initiated [] Hold pending MD visit [] Discharge    Electronically signed by: Larissa Mcdowell PTA    Note: If patient does not return for scheduled/recommended follow up visits, this note will serve as a discharge from care along with the most recent update on progress.

## 2022-09-13 ENCOUNTER — HOSPITAL ENCOUNTER (OUTPATIENT)
Dept: PHYSICAL THERAPY | Age: 87
Setting detail: THERAPIES SERIES
Discharge: HOME | End: 2022-09-13
Payer: MEDICARE

## 2022-09-13 PROCEDURE — 97110 THERAPEUTIC EXERCISES: CPT

## 2022-09-13 PROCEDURE — 97140 MANUAL THERAPY 1/> REGIONS: CPT

## 2022-09-13 NOTE — FLOWSHEET NOTE
[address]  Phone: [phone]   Fax:     [phone]            Physical Therapy Treatment Note/ Progress Report:       Date:  2022    Patient Name:  Lizeth Fry    :  1935  MRN: [id number]  Restrictions/Precautions:    Medical/Treatment Diagnosis Information:  Diagnosis: lumbar radiculopathy, back pain  Treatment Diagnosis: lumbar radiculopathy M54.16, back pain E31.3  Insurance/Certification information:  PT Insurance Information: [employer] Medicare  Physician Information:  José Montelongo MD  Plan of care signed (Y/N):     Date of Patient follow up with Physician: 2022     Progress Report: []  Yes  [x]  No     Date Range for reporting period:  Beginnin2022  Ending:     Progress report due (10 Rx/or 30 days whichever is less):      Recertification due (POC duration/ or 90 days whichever is less): 2022     Visit # Insurance Allowable Auth Needed   9 A[employer] Medicare []Yes    []No     Pain level:  4/10   Functional Scale: Foto FS Score 56, PHILIP 23.7%   Date Assessed: 2022    SUBJECTIVE:  Patient reports that her back is doing better. No longer having any spasms up her R side of back; but back just feels weak. Reports that it is hard for her to stand tall during walking.        OBJECTIVE:   Observation: ambulating with SPC and decreased L knee flexion and stance time on L  Test measurements:       Comments   Lumbar Flex 60     Lumbar Ext 20        ROM LEFT RIGHT Comments   Lumbar Side Bend 15 15 Stretch on R   Lumbar Rotation         Quadrant         Hip Flexion WNL WNL    Hip Abd         Hip ER Mild limitation Mild limitation     Hip IR Mild limitation Mild limitation     Hip Extension         Knee Extension         Knee Flex         Hamstring Flex         Piriformis         Choco test                       Myotomes/Strength Normal Abnormal Comments   [x]ALL NORMAL Hip Abd     L 3/5   Hip Ext     L 3/5   Hip flexion (L1-L2 femoral) []  []  L 3/5       RESTRICTIONS/PRECAUTIONS: none    Exercises/Interventions:     Therapeutic Ex (30500)   Min: 25 sets/sec reps notes   Hip Ext-POT 1 10 bilat   Prone glute max activation 10 10 bilat   SLR bilat 1 10 bilat   Standing hip flexion marches 2 10 bilat   Standing hip abduction 1 10 L , cues form   TA activation in hooklying 10 10    TA activation + hooklying march 1 10 bilat   LTR 1 20    Bridge 3sec 10 Very small lift   Band pull down 1 10 red   SL hip abd/clam      Lateral band pull      Lateral band walk      Bosu Lunge      Slide lunge       Ham string stretch      Hip Flex stretch      Glute Stretch      Seated forward ball roll 0     Seated no money 0  Red; seated EOB   SLS Ball/wall glute      Manual Intervention (22624) Min: 20      DN      Prone PA 1 7    GISTM/STM 1 6 R lumbar and thoracic paraspinals   R hip PROM 1 4'    L knee mobs 1 3'    Hip belt mobs      Hip LA distraction            NMR re-education (07703)   Min:      Mf Activation- re-ed      TrA Re-ed activation      Glute Max re-ed activation      Prone martín Baca            Therapeutic Activity (94401) Min:                                Therapeutic Exercise and NMR EXR  [x] (60169) Provided verbal/tactile cueing for activities related to strengthening, flexibility, endurance, ROM  for improvements in proximal hip and core control with self care, mobility, lifting and ambulation. [x] (00269) Provided verbal/tactile cueing for activities related to improving balance, coordination, kinesthetic sense, posture, motor skill, proprioception  to assist with core control in self care, mobility, lifting, and ambulation.      Therapeutic Activities:    [] (61014 or 85293) Provided verbal/tactile cueing for activities related to improving balance, coordination, kinesthetic sense, posture, motor skill, proprioception and motor activation to allow for proper function  with self care and ADLs  [] (88138) Provided training and instruction to the patient for proper core and proximal hip recruitment and positioning with ambulation re-education     Home Exercise Program:    [x] (03659) Reviewed/Progressed HEP activities related to strengthening, flexibility, endurance, ROM of core, proximal hip and LE for functional self-care, mobility, lifting and ambulation   [] (04355) Reviewed/Progressed HEP activities related to improving balance, coordination, kinesthetic sense, posture, motor skill, proprioception of core, proximal hip and LE for self care, mobility, lifting, and ambulation      Manual Treatments:  PROM / STM / Oscillations-Mobs:  G-I, II, III, IV (PA's, Inf., Post.)  [x] (98826) Provided manual therapy to mobilize proximal hip and LS spine soft tissue/joints for the purpose of modulating pain, promoting relaxation,  increasing ROM, reducing/eliminating soft tissue swelling/inflammation/restriction, improving soft tissue extensibility and allowing for proper ROM for normal function with self care, mobility, lifting and ambulation. Modalities:       Charges:  Timed Code Treatment Minutes: 45   Total Treatment Minutes: 45     [] EVAL (LOW) 47159 (typically 20 minutes face-to-face)  [] EVAL (MOD) 27396 (typically 30 minutes face-to-face)  [] EVAL (HIGH) 07671 (typically 45 minutes face-to-face)  [] RE-EVAL     [x] VW(39853) x   2  [] DRY NEEDLE 1 OR 2 MUSCLES  [] NMR (30474) x     [] DRY NEEDLE 3+ MUSCLES  [x] Manual (10005) x   1    [] TA (90800) x     [] [facility]91197[facility]  [] ES(attended) (85396)     [] ES (un) (90806):   [] VASO (99627)  [] Other:        GOALS:  Patient stated goal: improve walking and ability to do housework  [x] Progressing: [] Met: [] Not Met: [] Adjusted  Therapist goals for Patient:   Short Term Goals: To be achieved in: 2 weeks  1. Independent in HEP and progression per patient tolerance, in order to prevent re-injury.    [] Progressing: [x] Met: [] Not Met: [] Adjusted  2. Patient will have a decrease in pain to facilitate improvement in movement, function, and ADLs as indicated by Functional Deficits. [] Progressing: [x] Met: [] Not Met: [] Adjusted    Long Term Goals: To be achieved in: 6 weeks  1. Disability index score of 30% or less for the PHILIP or 55 or better on Foto to assist with reaching prior level of function. [] Progressing: [x] Met: [] Not Met: [] Adjusted  2. Patient will demonstrate increased AROM to WNL, good LS mobility, good hip ROM to allow for proper joint functioning as indicated by patients Functional Deficits. [x] Progressing: [] Met: [] Not Met: [] Adjusted  3. Patient will demonstrate an increase in Strength to good proximal hip and core activation to allow for proper functional mobility as indicated by patients Functional Deficits. [x] Progressing: [] Met: [] Not Met: [] Adjusted  4. Patient will return to standing and walking functional activities > 30 min without increased symptoms or restriction. [] Progressing: [x] Met: [] Not Met: [] Adjusted  5. Patient will report being able to perform housework with decreased symptoms or restriction, no greater than 2-3/10.(patient specific functional goal)    [x] Progressing: [] Met: [] Not Met: [] Adjusted     ASSESSMENT:  Good tolerance to current treatment. Patient has been making good steady improvement in overall pain and ability to perform functional mobility with lessening discomfort. Needs further increased challenge with spinal extensor strengthening. Patient would still benefit from further skilled PT services to address core and hip strength, as well as ROM and to further improve lumbar mobility.      Treatment/Activity Tolerance:  [x] Patient tolerated treatment well [] Patient limited by fatique  [] Patient limited by pain  [] Patient limited by other medical complications  [] Other:     Overall Progression Towards Functional goals/ Treatment Progress Update:  [x] Patient is progressing as expected towards functional goals listed. [] Progression is slowed due to complexities/Impairments listed. [] Progression has been slowed due to co-morbidities. [] Plan just implemented, too soon to assess goals progression <30days   [] Goals require adjustment due to lack of progress  [] Patient is not progressing as expected and requires additional follow up with physician  [] Other:    Prognosis for POC: [x] Good [] Fair  [] Poor    Patient requires continued skilled intervention: [x] Yes  [] No        PLAN:  [x] Continue per plan of care [] Alter current plan (see comments)  [] Plan of care initiated [] Hold pending MD visit [] Discharge    Electronically signed by: Aileen Mesa PT    Note: If patient does not return for scheduled/recommended follow up visits, this note will serve as a discharge from care along with the most recent update on progress.

## 2022-09-15 ENCOUNTER — HOSPITAL ENCOUNTER (OUTPATIENT)
Dept: PHYSICAL THERAPY | Age: 87
Setting detail: THERAPIES SERIES
Discharge: HOME | End: 2022-09-15
Payer: MEDICARE

## 2022-09-15 PROCEDURE — 97140 MANUAL THERAPY 1/> REGIONS: CPT

## 2022-09-15 PROCEDURE — 97110 THERAPEUTIC EXERCISES: CPT

## 2022-09-15 NOTE — FLOWSHEET NOTE
[address]  Phone: [phone]   Fax:     [phone]            Physical Therapy Treatment Note/ Progress Report:       Date:  09/15/2022    Patient Name:  Sarah Dc    :  1935  MRN: [id number]  Restrictions/Precautions:    Medical/Treatment Diagnosis Information:  Diagnosis: lumbar radiculopathy, back pain  Treatment Diagnosis: lumbar radiculopathy M54.16, back pain Y08.0  Insurance/Certification information:  PT Insurance Information: A[employer]na Medicare  Physician Information:  Zion Najera MD  Plan of care signed (Y/N):     Date of Patient follow up with Physician: 2022     Progress Report: []  Yes  [x]  No     Date Range for reporting period:  Beginnin2022  Ending:     Progress report due (10 Rx/or 30 days whichever is less):      Recertification due (POC duration/ or 90 days whichever is less): 2022     Visit # Insurance Allowable Auth Needed   10 [employer] Medicare []Yes    []No     Pain level:  4/10   Functional Scale: Foto FS Score 56, PHILIP 23.7%   Date Assessed: 2022    SUBJECTIVE:  Patient reports that she did a lot of sitting yesterday while working on her taxes; maybe 4 hours worth. This is longer than she is used to sitting and the back feels very stiff as a result. Pt with limited time for PT today; she has a  to attend this morning. OBJECTIVE: Focused session on manual therapy and mobility with decreased treatment time available.   Observation: ambulating with SPC and decreased L knee flexion and stance time on L  Test measurements:       Comments   Lumbar Flex 60     Lumbar Ext 20        ROM LEFT RIGHT Comments   Lumbar Side Bend 15 15 Stretch on R   Lumbar Rotation         Quadrant         Hip Flexion WNL WNL    Hip Abd         Hip ER Mild limitation Mild limitation     Hip IR Mild limitation Mild limitation     Hip Extension         Knee Extension         Knee Flex         Hamstring Flex         Piriformis         Choco test                       Myotomes/Strength Normal Abnormal Comments   [x]ALL NORMAL         Hip Abd     L 3/5   Hip Ext     L 3/5   Hip flexion (L1-L2 femoral) []  []  L 3/5       RESTRICTIONS/PRECAUTIONS: none    Exercises/Interventions:     Therapeutic Ex (34035)   Min: 10 sets/sec reps notes   Hip Ext-POT bilat   Prone glute max activation bilat   SLR bilat bilat   Standing hip flexion marches bilat   Standing hip abduction L , cues form   TA activation in hooklying 10 10    TA activation + hooklying march 1 10 bilat   LTR 1 10    Bridge Very small lift   Band pull down red   SL hip abd/clam      Lateral band pull      Lateral band walk      Bosu Lunge      Slide lunge       Ham string stretch      Hip Flex stretch      Glute Stretch      Seated forward ball roll 0     Seated no money 0  Red; seated EOB   SLS Ball/wall glute      Manual Intervention (62085) Min: 20      DN      Prone PA 1 7    GISTM/STM 1 6 R lumbar and thoracic paraspinals   R hip PROM 1 4'    L knee mobs 1 3'    Hip belt mobs      Hip LA distraction            NMR re-education (71211)   Min:      Mf Activation- re-ed      TrA Re-ed activation      Glute Max re-ed activation      Prone martín      [place]            Therapeutic Activity (36218) Min:                                Therapeutic Exercise and NMR EXR  [x] (96138) Provided verbal/tactile cueing for activities related to strengthening, flexibility, endurance, ROM  for improvements in proximal hip and core control with self care, mobility, lifting and ambulation. [x] (21923) Provided verbal/tactile cueing for activities related to improving balance, coordination, kinesthetic sense, posture, motor skill, proprioception  to assist with core control in self care, mobility, lifting, and ambulation.      Therapeutic Activities:    [] (95566 or ) Provided verbal/tactile cueing for activities related to improving balance, coordination, kinesthetic sense, posture, motor skill, proprioception and motor activation to allow for proper function  with self care and ADLs  [] (55996) Provided training and instruction to the patient for proper core and proximal hip recruitment and positioning with ambulation re-education     Home Exercise Program:    [x] (08379) Reviewed/Progressed HEP activities related to strengthening, flexibility, endurance, ROM of core, proximal hip and LE for functional self-care, mobility, lifting and ambulation   [] (69291) Reviewed/Progressed HEP activities related to improving balance, coordination, kinesthetic sense, posture, motor skill, proprioception of core, proximal hip and LE for self care, mobility, lifting, and ambulation      Manual Treatments:  PROM / STM / Oscillations-Mobs:  G-I, II, III, IV (PA's, Inf., Post.)  [x] (65153) Provided manual therapy to mobilize proximal hip and LS spine soft tissue/joints for the purpose of modulating pain, promoting relaxation,  increasing ROM, reducing/eliminating soft tissue swelling/inflammation/restriction, improving soft tissue extensibility and allowing for proper ROM for normal function with self care, mobility, lifting and ambulation. Modalities:       Charges:  Timed Code Treatment Minutes: 30   Total Treatment Minutes: 30     [] EVAL (LOW) 59957 (typically 20 minutes face-to-face)  [] EVAL (MOD) 78926 (typically 30 minutes face-to-face)  [] EVAL (HIGH) 87782 (typically 45 minutes face-to-face)  [] RE-EVAL     [x] AL(92480) x   1  [] DRY NEEDLE 1 OR 2 MUSCLES  [] NMR (75507) x     [] DRY NEEDLE 3+ MUSCLES  [x] Manual (90938) x   1    [] TA (92938) x     [] Mech Traction (61702)  [] ES(attended) (07488)     [] ES (un) (17404):   [] VASO (84939)  [] Other:        GOALS:  Patient stated goal: improve walking and ability to do housework  [x] Progressing: [] Met: [] Not Met: [] Adjusted  Therapist goals for Patient:   Short Term Goals:  To be achieved in: 2 weeks  1. Independent in HEP and progression per patient tolerance, in order to prevent re-injury. [] Progressing: [x] Met: [] Not Met: [] Adjusted  2. Patient will have a decrease in pain to facilitate improvement in movement, function, and ADLs as indicated by Functional Deficits. [] Progressing: [x] Met: [] Not Met: [] Adjusted    Long Term Goals: To be achieved in: 6 weeks  1. Disability index score of 30% or less for the PHILIP or 55 or better on Foto to assist with reaching prior level of function. [] Progressing: [x] Met: [] Not Met: [] Adjusted  2. Patient will demonstrate increased AROM to WNL, good LS mobility, good hip ROM to allow for proper joint functioning as indicated by patients Functional Deficits. [x] Progressing: [] Met: [] Not Met: [] Adjusted  3. Patient will demonstrate an increase in Strength to good proximal hip and core activation to allow for proper functional mobility as indicated by patients Functional Deficits. [x] Progressing: [] Met: [] Not Met: [] Adjusted  4. Patient will return to standing and walking functional activities > 30 min without increased symptoms or restriction. [] Progressing: [x] Met: [] Not Met: [] Adjusted  5. Patient will report being able to perform housework with decreased symptoms or restriction, no greater than 2-3/10.(patient specific functional goal)    [x] Progressing: [] Met: [] Not Met: [] Adjusted     ASSESSMENT:  Good tolerance to current treatment. Improved posture and gait at end of session. Patient has been making good steady improvement in overall pain and ability to perform functional mobility with lessening discomfort. Needs further increased challenge with spinal extensor strengthening. Patient would still benefit from further skilled PT services to address core and hip strength, as well as ROM and to further improve lumbar mobility.      Treatment/Activity Tolerance:  [x] Patient tolerated treatment well [] Patient limited by fatique  [] Patient limited by pain  [] Patient limited by other medical complications  [] Other:     Overall Progression Towards Functional goals/ Treatment Progress Update:  [x] Patient is progressing as expected towards functional goals listed. [] Progression is slowed due to complexities/Impairments listed. [] Progression has been slowed due to co-morbidities. [] Plan just implemented, too soon to assess goals progression <30days   [] Goals require adjustment due to lack of progress  [] Patient is not progressing as expected and requires additional follow up with physician  [] Other:    Prognosis for POC: [x] Good [] Fair  [] Poor    Patient requires continued skilled intervention: [x] Yes  [] No        PLAN:  [x] Continue per plan of care [] Alter current plan (see comments)  [] Plan of care initiated [] Hold pending MD visit [] Discharge    Electronically signed by: Mortimer Hones, PTA    Note: If patient does not return for scheduled/recommended follow up visits, this note will serve as a discharge from care along with the most recent update on progress.

## 2022-09-20 ENCOUNTER — HOSPITAL ENCOUNTER (OUTPATIENT)
Dept: PHYSICAL THERAPY | Age: 87
Setting detail: THERAPIES SERIES
Discharge: HOME | End: 2022-09-20
Payer: MEDICARE

## 2022-09-20 PROCEDURE — 97140 MANUAL THERAPY 1/> REGIONS: CPT

## 2022-09-20 PROCEDURE — 97110 THERAPEUTIC EXERCISES: CPT

## 2022-09-20 NOTE — FLOWSHEET NOTE
Piriformis         Choco test                       Myotomes/Strength Normal Abnormal Comments   [x]ALL NORMAL         Hip Abd     L 3/5   Hip Ext     L 3/5   Hip flexion (L1-L2 femoral) []  []  L 3/5       RESTRICTIONS/PRECAUTIONS: none    Exercises/Interventions:     Therapeutic Ex (47979)   Min: 25 sets/sec reps notes   Hip Ext-POT 1 10 bilat   Prone glute max activation 10 10 bilat   SLR bilat 1 10 bilat   Standing hip flexion marches 2 10 bilat   Standing hip abduction 1 10 L , cues form   TA activation in hooklying 10 10    TA activation + hooklying march 1 10 bilat   LTR 1 10    Bridge Very small lift   Band pull down red   SL hip abd/clam      Lateral band pull      Lateral band walk      Bosu Lunge      Slide lunge       Ham string stretch      Hip Flex stretch      Glute Stretch      Seated forward ball roll 0     Seated no money 0  Red; seated EOB   SLS Ball/wall glute      Manual Intervention (96445) Min: 20      DN      Prone PA 1 7    GISTM/STM 1 6 R lumbar and thoracic paraspinals   bilat hip PROM/stretching 1 4'    L knee mobs 1 3'    Hip belt mobs      Hip LA distraction            NMR re-education (05053)   Min:      Mf Activation- re-ed      TrA Re-ed activation      Glute Max re-ed activation      Prone martín Baca            Therapeutic Activity (16830) Min:                                Therapeutic Exercise and NMR EXR  [x] (66801) Provided verbal/tactile cueing for activities related to strengthening, flexibility, endurance, ROM  for improvements in proximal hip and core control with self care, mobility, lifting and ambulation. [x] (81374) Provided verbal/tactile cueing for activities related to improving balance, coordination, kinesthetic sense, posture, motor skill, proprioception  to assist with core control in self care, mobility, lifting, and ambulation.      Therapeutic Activities:    [] (09849 or ) Provided verbal/tactile cueing for activities related to improving balance, coordination, kinesthetic sense, posture, motor skill, proprioception and motor activation to allow for proper function  with self care and ADLs  [] (77749) Provided training and instruction to the patient for proper core and proximal hip recruitment and positioning with ambulation re-education     Home Exercise Program:    [x] (07266) Reviewed/Progressed HEP activities related to strengthening, flexibility, endurance, ROM of core, proximal hip and LE for functional self-care, mobility, lifting and ambulation   [] (07707) Reviewed/Progressed HEP activities related to improving balance, coordination, kinesthetic sense, posture, motor skill, proprioception of core, proximal hip and LE for self care, mobility, lifting, and ambulation      Manual Treatments:  PROM / STM / Oscillations-Mobs:  G-I, II, III, IV (PA's, Inf., Post.)  [x] (21598) Provided manual therapy to mobilize proximal hip and LS spine soft tissue/joints for the purpose of modulating pain, promoting relaxation,  increasing ROM, reducing/eliminating soft tissue swelling/inflammation/restriction, improving soft tissue extensibility and allowing for proper ROM for normal function with self care, mobility, lifting and ambulation. Modalities:       Charges:  Timed Code Treatment Minutes: 45   Total Treatment Minutes: 45     [] EVAL (LOW) 87176 (typically 20 minutes face-to-face)  [] EVAL (MOD) 29905 (typically 30 minutes face-to-face)  [] EVAL (HIGH) 62027 (typically 45 minutes face-to-face)  [] RE-EVAL     [x] GM(76844) x   2  [] DRY NEEDLE 1 OR 2 MUSCLES  [] NMR (91137) x     [] DRY NEEDLE 3+ MUSCLES  [x] Manual (93562) x   1    [] TA (53906) x     [] Mech Traction (27674)  [] ES(attended) (44090)     [] ES (un) (76000):   [] VASO (97315)  [] Other:        GOALS:  Patient stated goal: improve walking and ability to do housework  [x] Progressing: [] Met: [] Not Met: [] Adjusted  Therapist goals for Patient:   Short Term Goals:  To be address core and hip strength, as well as ROM and to further improve lumbar mobility. Treatment/Activity Tolerance:  [x] Patient tolerated treatment well [] Patient limited by fatique  [] Patient limited by pain  [] Patient limited by other medical complications  [] Other:     Overall Progression Towards Functional goals/ Treatment Progress Update:  [x] Patient is progressing as expected towards functional goals listed. [] Progression is slowed due to complexities/Impairments listed. [] Progression has been slowed due to co-morbidities. [] Plan just implemented, too soon to assess goals progression <30days   [] Goals require adjustment due to lack of progress  [] Patient is not progressing as expected and requires additional follow up with physician  [] Other:    Prognosis for POC: [x] Good [] Fair  [] Poor    Patient requires continued skilled intervention: [x] Yes  [] No        PLAN:  [x] Continue per plan of care [] Alter current plan (see comments)  [] Plan of care initiated [] Hold pending MD visit [] Discharge    Electronically signed by: Clover Natarajan PT    Note: If patient does not return for scheduled/recommended follow up visits, this note will serve as a discharge from care along with the most recent update on progress.

## 2022-09-22 ENCOUNTER — HOSPITAL ENCOUNTER (OUTPATIENT)
Dept: PHYSICAL THERAPY | Age: 87
Setting detail: THERAPIES SERIES
Discharge: HOME | End: 2022-09-22
Payer: MEDICARE

## 2022-09-22 PROCEDURE — 97140 MANUAL THERAPY 1/> REGIONS: CPT

## 2022-09-22 PROCEDURE — 97110 THERAPEUTIC EXERCISES: CPT

## 2022-09-22 NOTE — FLOWSHEET NOTE
[address]  Phone: [phone]   Fax:     [phone]    Physical Therapy Treatment Note/ Progress Report:       Date:  2022    Patient Name:  Deepak Calderón    :  1935  MRN: [id number]  Restrictions/Precautions:    Medical/Treatment Diagnosis Information:  Diagnosis: lumbar radiculopathy, back pain  Treatment Diagnosis: lumbar radiculopathy M54.16, back pain F58.9  Insurance/Certification information:  PT Insurance Information: [employer] Medicare  Physician Information:  Bennie Sheppard MD  Plan of care signed (Y/N):     Date of Patient follow up with Physician: 2022     Progress Report: []  Yes  [x]  No     Date Range for reporting period:  Beginnin2022  Ending:     Progress report due (10 Rx/or 30 days whichever is less): 4776     Recertification due (POC duration/ or 90 days whichever is less): 2022     Visit # Insurance Allowable Auth Needed   12 [employer] Medicare []Yes    []No     Pain level:  4/10   Functional Scale: Foto FS Score 56, PHILIP 23.7%   Date Assessed: 2022    SUBJECTIVE:  Patient reports that she was achy yesterday, but feeling better today. Reports that she felt pretty good leaving the other day. L knee is feeling pretty good today. OBJECTIVE: Focused session on manual therapy and mobility with decreased treatment time available.   Observation: ambulating with SPC and decreased L knee flexion and stance time on L  Test measurements:       Comments   Lumbar Flex 60     Lumbar Ext 20        ROM LEFT RIGHT Comments   Lumbar Side Bend 15 15 Stretch on R   Lumbar Rotation         Quadrant         Hip Flexion WNL WNL    Hip Abd         Hip ER Mild limitation Mild limitation     Hip IR Mild limitation Mild limitation     Hip Extension         Knee Extension         Knee Flex         Hamstring Flex         Piriformis         Choco test Myotomes/Strength Normal Abnormal Comments   [x]ALL NORMAL         Hip Abd     L 3/5   Hip Ext     L 3/5   Hip flexion (L1-L2 femoral) []  []  L 3/5       RESTRICTIONS/PRECAUTIONS: none    Exercises/Interventions:     Therapeutic Ex (11301)   Min: 25 sets/sec reps notes   Hip Ext-POT 1 10 bilat   Prone glute max activation 10 10 bilat   SLR bilat 1 10 bilat   Standing hip flexion marches 2 10 bilat   Standing hip abduction 1 10 L , cues form   TA activation in hooklying 10 10    TA activation + hooklying march 1 10 bilat   LTR 1 10    Bridge Very small lift   Band pull down in modified tandem 1 10 red   SL hip abd/clam      Lateral band pull      Lateral band walk      Bosu Lunge      Slide lunge       Ham string stretch      Hip Flex stretch      Glute Stretch      Seated forward ball roll 0     Seated no money 0  Red; seated EOB   SLS Ball/wall glute      Manual Intervention (46043) Min: 20      DN      Prone PA 1 7    GISTM/STM 1 6 R lumbar and thoracic paraspinals   bilat hip PROM/stretching 1 4'    L knee mobs 1 3'    Hip belt mobs      Hip LA distraction            NMR re-education (52480)   Min:      Mf Activation- re-ed      TrA Re-ed activation      Glute Max re-ed activation      Prone froggers      [place]            Therapeutic Activity (55050) Min:                                Therapeutic Exercise and NMR EXR  [x] (72960) Provided verbal/tactile cueing for activities related to strengthening, flexibility, endurance, ROM  for improvements in proximal hip and core control with self care, mobility, lifting and ambulation. [x] (25266) Provided verbal/tactile cueing for activities related to improving balance, coordination, kinesthetic sense, posture, motor skill, proprioception  to assist with core control in self care, mobility, lifting, and ambulation.      Therapeutic Activities:    [] (92999 or ) Provided verbal/tactile cueing for activities related to improving balance, coordination, kinesthetic sense, posture, motor skill, proprioception and motor activation to allow for proper function  with self care and ADLs  [] (76443) Provided training and instruction to the patient for proper core and proximal hip recruitment and positioning with ambulation re-education     Home Exercise Program:    [x] (61939) Reviewed/Progressed HEP activities related to strengthening, flexibility, endurance, ROM of core, proximal hip and LE for functional self-care, mobility, lifting and ambulation   [] (78544) Reviewed/Progressed HEP activities related to improving balance, coordination, kinesthetic sense, posture, motor skill, proprioception of core, proximal hip and LE for self care, mobility, lifting, and ambulation      Manual Treatments:  PROM / STM / Oscillations-Mobs:  G-I, II, III, IV (PA's, Inf., Post.)  [x] (65305) Provided manual therapy to mobilize proximal hip and LS spine soft tissue/joints for the purpose of modulating pain, promoting relaxation,  increasing ROM, reducing/eliminating soft tissue swelling/inflammation/restriction, improving soft tissue extensibility and allowing for proper ROM for normal function with self care, mobility, lifting and ambulation. Modalities:       Charges:  Timed Code Treatment Minutes: 45   Total Treatment Minutes: 45     [] EVAL (LOW) 85659 (typically 20 minutes face-to-face)  [] EVAL (MOD) 87497 (typically 30 minutes face-to-face)  [] EVAL (HIGH) 59627 (typically 45 minutes face-to-face)  [] RE-EVAL     [x] HH(02115) x   2  [] DRY NEEDLE 1 OR 2 MUSCLES  [] NMR (85064) x     [] DRY NEEDLE 3+ MUSCLES  [x] Manual (66718) x   1    [] TA (05676) x     [] Mech Traction (59194)  [] ES(attended) (70646)     [] ES (un) (44397):   [] VASO (60465)  [] Other:        GOALS:  Patient stated goal: improve walking and ability to do housework  [x] Progressing: [] Met: [] Not Met: [] Adjusted  Therapist goals for Patient:   Short Term Goals: To be achieved in: 2 weeks  1. Independent in HEP and progression per patient tolerance, in order to prevent re-injury. [] Progressing: [x] Met: [] Not Met: [] Adjusted  2. Patient will have a decrease in pain to facilitate improvement in movement, function, and ADLs as indicated by Functional Deficits. [] Progressing: [x] Met: [] Not Met: [] Adjusted    Long Term Goals: To be achieved in: 6 weeks  1. Disability index score of 30% or less for the PHILIP or 55 or better on Foto to assist with reaching prior level of function. [] Progressing: [x] Met: [] Not Met: [] Adjusted  2. Patient will demonstrate increased AROM to WNL, good LS mobility, good hip ROM to allow for proper joint functioning as indicated by patients Functional Deficits. [x] Progressing: [] Met: [] Not Met: [] Adjusted  3. Patient will demonstrate an increase in Strength to good proximal hip and core activation to allow for proper functional mobility as indicated by patients Functional Deficits. [x] Progressing: [] Met: [] Not Met: [] Adjusted  4. Patient will return to standing and walking functional activities > 30 min without increased symptoms or restriction. [] Progressing: [x] Met: [] Not Met: [] Adjusted  5. Patient will report being able to perform housework with decreased symptoms or restriction, no greater than 2-3/10.(patient specific functional goal)    [x] Progressing: [] Met: [] Not Met: [] Adjusted     ASSESSMENT:  Tight along R lumbar paraspinals and R lumbar mobility today, as well as along L5/S1. Good improvement in soft tissue restriction and l/spine joint mobility after manual therapy. Did well with all strengthening. Challenged with increased spinal extension positions and fatigues quickly. Patient has been making good steady improvement in overall pain and ability to perform functional mobility with lessening discomfort. Needs further increased challenge with spinal extensor strengthening.  Patient would still benefit from further skilled PT services to address core and hip strength, as well as ROM and to further improve lumbar mobility. Treatment/Activity Tolerance:  [x] Patient tolerated treatment well [] Patient limited by fatique  [] Patient limited by pain  [] Patient limited by other medical complications  [] Other:     Overall Progression Towards Functional goals/ Treatment Progress Update:  [x] Patient is progressing as expected towards functional goals listed. [] Progression is slowed due to complexities/Impairments listed. [] Progression has been slowed due to co-morbidities. [] Plan just implemented, too soon to assess goals progression <30days   [] Goals require adjustment due to lack of progress  [] Patient is not progressing as expected and requires additional follow up with physician  [] Other:    Prognosis for POC: [x] Good [] Fair  [] Poor    Patient requires continued skilled intervention: [x] Yes  [] No        PLAN:  [x] Continue per plan of care [] Alter current plan (see comments)  [] Plan of care initiated [] Hold pending MD visit [] Discharge    Electronically signed by: Lorri Antunez PT    Note: If patient does not return for scheduled/recommended follow up visits, this note will serve as a discharge from care along with the most recent update on progress.

## 2022-09-26 ENCOUNTER — HOSPITAL ENCOUNTER (OUTPATIENT)
Dept: PHYSICAL THERAPY | Age: 87
Setting detail: THERAPIES SERIES
Discharge: HOME | End: 2022-09-26
Payer: MEDICARE

## 2022-09-26 PROCEDURE — 97140 MANUAL THERAPY 1/> REGIONS: CPT

## 2022-09-26 PROCEDURE — 97110 THERAPEUTIC EXERCISES: CPT

## 2022-09-26 NOTE — FLOWSHEET NOTE
[address]  Phone: [phone]   Fax:     [phone]    Physical Therapy Treatment Note/ Progress Report:       Date:  2022    Patient Name:  Ashlee Griffith    :  1935  MRN: [id number]  Restrictions/Precautions:    Medical/Treatment Diagnosis Information:  Diagnosis: lumbar radiculopathy, back pain  Treatment Diagnosis: lumbar radiculopathy M54.16, back pain X46.3  Insurance/Certification information:  PT Insurance Information: [employer] Medicare  Physician Information:  Shari Machado MD  Plan of care signed (Y/N):     Date of Patient follow up with Physician: 2022     Progress Report: []  Yes  [x]  No     Date Range for reporting period:  Beginnin2022  Ending:     Progress report due (10 Rx/or 30 days whichever is less):      Recertification due (POC duration/ or 90 days whichever is less): 2022     Visit # Insurance Allowable Auth Needed   13 [employer] Medicare []Yes    []No     Pain level:  4/10   Functional Scale: Foto FS Score 56, PHILIP 23.7%   Date Assessed: 2022    SUBJECTIVE:  Patient reports that she felt ok after last session. Did a lot of standing today; so feeling more sore and achy in low back today. Still not having spasms higher up and noticing that overall she is having better days and lasting longer in her low back. OBJECTIVE: Focused session on manual therapy and mobility with decreased treatment time available.   Observation: ambulating with SPC and decreased L knee flexion and stance time on L  Test measurements:       Comments   Lumbar Flex 60     Lumbar Ext 20        ROM LEFT RIGHT Comments   Lumbar Side Bend 15 15 Stretch on R   Lumbar Rotation         Quadrant         Hip Flexion WNL WNL    Hip Abd         Hip ER Mild limitation Mild limitation     Hip IR Mild limitation Mild limitation     Hip Extension         Knee Extension         Knee Flex verbal/tactile cueing for activities related to improving balance, coordination, kinesthetic sense, posture, motor skill, proprioception and motor activation to allow for proper function  with self care and ADLs  [] (43635) Provided training and instruction to the patient for proper core and proximal hip recruitment and positioning with ambulation re-education     Home Exercise Program:    [x] (40373) Reviewed/Progressed HEP activities related to strengthening, flexibility, endurance, ROM of core, proximal hip and LE for functional self-care, mobility, lifting and ambulation   [] (51177) Reviewed/Progressed HEP activities related to improving balance, coordination, kinesthetic sense, posture, motor skill, proprioception of core, proximal hip and LE for self care, mobility, lifting, and ambulation      Manual Treatments:  PROM / STM / Oscillations-Mobs:  G-I, II, III, IV (PA's, Inf., Post.)  [x] (43138) Provided manual therapy to mobilize proximal hip and LS spine soft tissue/joints for the purpose of modulating pain, promoting relaxation,  increasing ROM, reducing/eliminating soft tissue swelling/inflammation/restriction, improving soft tissue extensibility and allowing for proper ROM for normal function with self care, mobility, lifting and ambulation.      Modalities:       Charges:  Timed Code Treatment Minutes: 45   Total Treatment Minutes: 45     [] EVAL (LOW) 58404 (typically 20 minutes face-to-face)  [] EVAL (MOD) 29025 (typically 30 minutes face-to-face)  [] EVAL (HIGH) 02267 (typically 45 minutes face-to-face)  [] RE-EVAL     [x] PE(85479) x   2  [] DRY NEEDLE 1 OR 2 MUSCLES  [] NMR (39840) x     [] DRY NEEDLE 3+ MUSCLES  [x] Manual (86153) x   1    [] TA (28972) x     [] Mech Traction (06384)  [] ES(attended) (59038)     [] ES (un) (19176):   [] VASO (27113)  [] Other:        GOALS:  Patient stated goal: improve walking and ability to do housework  [x] Progressing: [] Met: [] Not Met: [] Adjusted  Therapist goals for Patient:   Short Term Goals: To be achieved in: 2 weeks  1. Independent in HEP and progression per patient tolerance, in order to prevent re-injury. [] Progressing: [x] Met: [] Not Met: [] Adjusted  2. Patient will have a decrease in pain to facilitate improvement in movement, function, and ADLs as indicated by Functional Deficits. [] Progressing: [x] Met: [] Not Met: [] Adjusted    Long Term Goals: To be achieved in: 6 weeks  1. Disability index score of 30% or less for the PHILIP or 55 or better on Foto to assist with reaching prior level of function. [] Progressing: [x] Met: [] Not Met: [] Adjusted  2. Patient will demonstrate increased AROM to WNL, good LS mobility, good hip ROM to allow for proper joint functioning as indicated by patients Functional Deficits. [x] Progressing: [] Met: [] Not Met: [] Adjusted  3. Patient will demonstrate an increase in Strength to good proximal hip and core activation to allow for proper functional mobility as indicated by patients Functional Deficits. [x] Progressing: [] Met: [] Not Met: [] Adjusted  4. Patient will return to standing and walking functional activities > 30 min without increased symptoms or restriction. [] Progressing: [x] Met: [] Not Met: [] Adjusted  5. Patient will report being able to perform housework with decreased symptoms or restriction, no greater than 2-3/10.(patient specific functional goal)    [x] Progressing: [] Met: [] Not Met: [] Adjusted     ASSESSMENT:  Tight along R lumbar paraspinals and R lumbar mobility today, as well as along L5/S1. Good improvement in soft tissue restriction and l/spine joint mobility after manual therapy. Did well with all strengthening. Challenged with increased spinal extension positions and fatigues quickly. Patient has been making good steady improvement in overall pain and ability to perform functional mobility with lessening discomfort.  Needs further increased challenge with spinal extensor strengthening. Patient would still benefit from further skilled PT services to address core and hip strength, as well as ROM and to further improve lumbar mobility. Treatment/Activity Tolerance:  [x] Patient tolerated treatment well [] Patient limited by fatique  [] Patient limited by pain  [] Patient limited by other medical complications  [] Other:     Overall Progression Towards Functional goals/ Treatment Progress Update:  [x] Patient is progressing as expected towards functional goals listed. [] Progression is slowed due to complexities/Impairments listed. [] Progression has been slowed due to co-morbidities. [] Plan just implemented, too soon to assess goals progression <30days   [] Goals require adjustment due to lack of progress  [] Patient is not progressing as expected and requires additional follow up with physician  [] Other:    Prognosis for POC: [x] Good [] Fair  [] Poor    Patient requires continued skilled intervention: [x] Yes  [] No        PLAN:  [x] Continue per plan of care [] Alter current plan (see comments)  [] Plan of care initiated [] Hold pending MD visit [] Discharge    Electronically signed by: Scar Perdomo, PT    Note: If patient does not return for scheduled/recommended follow up visits, this note will serve as a discharge from care along with the most recent update on progress.

## 2022-09-27 ENCOUNTER — APPOINTMENT (OUTPATIENT)
Dept: PHYSICAL THERAPY | Age: 87
End: 2022-09-27
Payer: MEDICARE

## 2022-09-29 ENCOUNTER — HOSPITAL ENCOUNTER (OUTPATIENT)
Dept: PHYSICAL THERAPY | Age: 87
Setting detail: THERAPIES SERIES
Discharge: HOME | End: 2022-09-29
Payer: MEDICARE

## 2022-09-29 PROCEDURE — 97140 MANUAL THERAPY 1/> REGIONS: CPT

## 2022-09-29 PROCEDURE — 97110 THERAPEUTIC EXERCISES: CPT

## 2022-09-29 NOTE — FLOWSHEET NOTE
[address]  Phone: [phone]   Fax:     [phone]    Physical Therapy Treatment Note/ Progress Report:       Date:  2022    Patient Name:  Lizeth Fry    :  1935  MRN: [id number]  Restrictions/Precautions:    Medical/Treatment Diagnosis Information:  Diagnosis: lumbar radiculopathy, back pain  Treatment Diagnosis: lumbar radiculopathy M54.16, back pain R83.5  Insurance/Certification information:  PT Insurance Information: [employer] Medicare  Physician Information:  José Montelongo MD  Plan of care signed (Y/N):     Date of Patient follow up with Physician: 2022     Progress Report: []  Yes  [x]  No     Date Range for reporting period:  Beginnin2022  Ending:     Progress report due (10 Rx/or 30 days whichever is less):      Recertification due (POC duration/ or 90 days whichever is less): 2022     Visit # Insurance Allowable Auth Needed   14 [employer] Medicare []Yes    []No     Pain level:  4/10   Functional Scale: Foto FS Score 56, PHILIP 23.7%   Date Assessed: 2022    SUBJECTIVE:  Patient reports that she felt ok after last session. Not having as much pain in her R side of back. Overall not feeling good today. L knee continues to bother her. OBJECTIVE: Focused session on manual therapy and mobility with decreased treatment time available.   Observation: ambulating with SPC and decreased L knee flexion and stance time on L  Test measurements:       Comments   Lumbar Flex 60     Lumbar Ext 20        ROM LEFT RIGHT Comments   Lumbar Side Bend 15 15 Stretch on R   Lumbar Rotation         Quadrant         Hip Flexion WNL WNL    Hip Abd         Hip ER Mild limitation Mild limitation     Hip IR Mild limitation Mild limitation     Hip Extension         Knee Extension         Knee Flex         Hamstring Flex         Piriformis         Choco test Myotomes/Strength Normal Abnormal Comments   [x]ALL NORMAL         Hip Abd     L 3/5   Hip Ext     L 3/5   Hip flexion (L1-L2 femoral) []  []  L 3/5       RESTRICTIONS/PRECAUTIONS: none    Exercises/Interventions:     Therapeutic Ex (70354)   Min: 25 sets/sec reps notes   Hip Ext-POT 1 10 bilat   Prone glute max activation 10 10 bilat   SLR bilat 1 10 bilat   Standing hip flexion marches 2 10 bilat   Standing hip abduction 1 10 L , cues form   TA activation in hooklying 10 10    TA activation + hooklying march 1 10 bilat   LTR 1 10    Bridge Very small lift   Band pull down in modified tandem 1 10 red   SL hip abd/clam      Lateral band pull      Lateral band walk      Bosu Lunge      Slide lunge       Ham string stretch      Hip Flex stretch      Glute Stretch      Seated forward ball roll 10 10 Blue  swiss ball   Seated no money 0  Red; seated EOB   SLS Ball/wall glute      Manual Intervention (96046) Min: 20      DN      Prone PA 1 7    GISTM/STM 1 6 R lumbar and thoracic paraspinals   bilat hip PROM/stretching 1 4'    L knee mobs 1 3'    Hip belt mobs      Hip LA distraction            NMR re-education (65560)   Min:      Mf Activation- re-ed      TrA Re-ed activation      Glute Max re-ed activation      Prone frogalli      Carter            Therapeutic Activity (64016) Min:                                Therapeutic Exercise and NMR EXR  [x] (86395) Provided verbal/tactile cueing for activities related to strengthening, flexibility, endurance, ROM  for improvements in proximal hip and core control with self care, mobility, lifting and ambulation. [x] (84766) Provided verbal/tactile cueing for activities related to improving balance, coordination, kinesthetic sense, posture, motor skill, proprioception  to assist with core control in self care, mobility, lifting, and ambulation.      Therapeutic Activities:    [] (84129 or 47007) Provided verbal/tactile cueing for activities related to improving balance, coordination, kinesthetic sense, posture, motor skill, proprioception and motor activation to allow for proper function  with self care and ADLs  [] (05196) Provided training and instruction to the patient for proper core and proximal hip recruitment and positioning with ambulation re-education     Home Exercise Program:    [x] (74655) Reviewed/Progressed HEP activities related to strengthening, flexibility, endurance, ROM of core, proximal hip and LE for functional self-care, mobility, lifting and ambulation   [] (62927) Reviewed/Progressed HEP activities related to improving balance, coordination, kinesthetic sense, posture, motor skill, proprioception of core, proximal hip and LE for self care, mobility, lifting, and ambulation      Manual Treatments:  PROM / STM / Oscillations-Mobs:  G-I, II, III, IV (PA's, Inf., Post.)  [x] (27927) Provided manual therapy to mobilize proximal hip and LS spine soft tissue/joints for the purpose of modulating pain, promoting relaxation,  increasing ROM, reducing/eliminating soft tissue swelling/inflammation/restriction, improving soft tissue extensibility and allowing for proper ROM for normal function with self care, mobility, lifting and ambulation. Modalities:       Charges:  Timed Code Treatment Minutes: 45   Total Treatment Minutes: 45     [] EVAL (LOW) 57521 (typically 20 minutes face-to-face)  [] EVAL (MOD) 11921 (typically 30 minutes face-to-face)  [] EVAL (HIGH) 92421 (typically 45 minutes face-to-face)  [] RE-EVAL     [x] ER(39863) x   2  [] DRY NEEDLE 1 OR 2 MUSCLES  [] NMR (97527) x     [] DRY NEEDLE 3+ MUSCLES  [x] Manual (64316) x   1    [] TA (63112) x     [] Mech Traction (21240)  [] ES(attended) (81354)     [] ES (un) (35016):   [] VASO (43439)  [] Other:        GOALS:  Patient stated goal: improve walking and ability to do housework  [x] Progressing: [] Met: [] Not Met: [] Adjusted  Therapist goals for Patient:   Short Term Goals:  To be achieved in: 2 weeks  1. Independent in HEP and progression per patient tolerance, in order to prevent re-injury. [] Progressing: [x] Met: [] Not Met: [] Adjusted  2. Patient will have a decrease in pain to facilitate improvement in movement, function, and ADLs as indicated by Functional Deficits. [] Progressing: [x] Met: [] Not Met: [] Adjusted    Long Term Goals: To be achieved in: 6 weeks  1. Disability index score of 30% or less for the PHILIP or 55 or better on Foto to assist with reaching prior level of function. [] Progressing: [x] Met: [] Not Met: [] Adjusted  2. Patient will demonstrate increased AROM to WNL, good LS mobility, good hip ROM to allow for proper joint functioning as indicated by patients Functional Deficits. [x] Progressing: [] Met: [] Not Met: [] Adjusted  3. Patient will demonstrate an increase in Strength to good proximal hip and core activation to allow for proper functional mobility as indicated by patients Functional Deficits. [x] Progressing: [] Met: [] Not Met: [] Adjusted  4. Patient will return to standing and walking functional activities > 30 min without increased symptoms or restriction. [] Progressing: [x] Met: [] Not Met: [] Adjusted  5. Patient will report being able to perform housework with decreased symptoms or restriction, no greater than 2-3/10.(patient specific functional goal)    [x] Progressing: [] Met: [] Not Met: [] Adjusted     ASSESSMENT:  Lessening restriction along R lumbar paraspinals and improving R lumbar mobility today, as well as along L5/S1. Good improvement in soft tissue restriction and l/spine joint mobility after manual therapy. Did well with all strengthening. Challenged with increased spinal extension positions and fatigues quickly. Patient has been making good steady improvement in overall pain and ability to perform functional mobility with lessening discomfort. Needs further increased challenge with spinal extensor strengthening.  Patient would still benefit from further skilled PT services to address core and hip strength, as well as ROM and to further improve lumbar mobility. Treatment/Activity Tolerance:  [x] Patient tolerated treatment well [] Patient limited by fatique  [] Patient limited by pain  [] Patient limited by other medical complications  [] Other:     Overall Progression Towards Functional goals/ Treatment Progress Update:  [x] Patient is progressing as expected towards functional goals listed. [] Progression is slowed due to complexities/Impairments listed. [] Progression has been slowed due to co-morbidities. [] Plan just implemented, too soon to assess goals progression <30days   [] Goals require adjustment due to lack of progress  [] Patient is not progressing as expected and requires additional follow up with physician  [] Other:    Prognosis for POC: [x] Good [] Fair  [] Poor    Patient requires continued skilled intervention: [x] Yes  [] No        PLAN:  [x] Continue per plan of care [] Alter current plan (see comments)  [] Plan of care initiated [] Hold pending MD visit [] Discharge    Electronically signed by: Darren Ferguson PT    Note: If patient does not return for scheduled/recommended follow up visits, this note will serve as a discharge from care along with the most recent update on progress.

## 2022-10-03 ENCOUNTER — HOSPITAL ENCOUNTER (OUTPATIENT)
Dept: PHYSICAL THERAPY | Age: 87
Setting detail: THERAPIES SERIES
Discharge: HOME | End: 2022-10-03
Payer: MEDICARE

## 2022-10-03 PROCEDURE — 97110 THERAPEUTIC EXERCISES: CPT

## 2022-10-03 PROCEDURE — 97140 MANUAL THERAPY 1/> REGIONS: CPT

## 2022-10-03 NOTE — FLOWSHEET NOTE
[address]  Phone: [phone]   Fax:     [phone]    Physical Therapy Treatment Note/ Progress Report:       Date:  10/03/2022    Patient Name:  Marie De La Paz    :  1935  MRN: [id number]  Restrictions/Precautions:    Medical/Treatment Diagnosis Information:  Diagnosis: lumbar radiculopathy, back pain  Treatment Diagnosis: lumbar radiculopathy M54.16, back pain N15.6  Insurance/Certification information:  PT Insurance Information: [employer] Medicare  Physician Information:  Fernie Hutchins MD  Plan of care signed (Y/N):     Date of Patient follow up with Physician: 2022     Progress Report: []  Yes  [x]  No     Date Range for reporting period:  Beginnin2022  Ending:     Progress report due (10 Rx/or 30 days whichever is less): 7/10/6033     Recertification due (POC duration/ or 90 days whichever is less): 2022     Visit # Insurance Allowable Auth Needed   15 [employer] Medicare []Yes    []No     Pain level:  4/10   Functional Scale: Foto FS Score 56, PHILIP 23.7%   Date Assessed: 2022    SUBJECTIVE:  Patient reports that she was more sore across her low back after last session, but only lasted for the day. States she isn't sure why she was sore like that. OBJECTIVE: Focused session on manual therapy and mobility with decreased treatment time available.   Observation: ambulating with SPC and decreased L knee flexion and stance time on L  Test measurements:       Comments   Lumbar Flex 60     Lumbar Ext 20        ROM LEFT RIGHT Comments   Lumbar Side Bend 15 15 Stretch on R   Lumbar Rotation         Quadrant         Hip Flexion WNL WNL    Hip Abd         Hip ER Mild limitation Mild limitation     Hip IR Mild limitation Mild limitation     Hip Extension         Knee Extension         Knee Flex         Hamstring Flex         Piriformis         Choco test Myotomes/Strength Normal Abnormal Comments   [x]ALL NORMAL         Hip Abd     L 3/5   Hip Ext     L 3/5   Hip flexion (L1-L2 femoral) []  []  L 3/5       RESTRICTIONS/PRECAUTIONS: none    Exercises/Interventions:     Therapeutic Ex (89766)   Min: 25 sets/sec reps notes   Hip Ext-POT 1 10 bilat   Prone glute max activation 10 10 bilat   SLR bilat 1 10 bilat   Standing hip flexion marches 2 10 bilat   Standing hip abduction 1 10 L , cues form   TA activation in hooklying 10 10    TA activation + hooklying march 1 10 bilat   LTR 1 10    Bridge Very small lift   Band pull down in modified tandem 1 10 red   SL hip abd/clam      Lateral band pull      Lateral band walk      Bosu Lunge      Slide lunge       Ham string stretch      Hip Flex stretch      Glute Stretch      Seated forward ball roll 10 10 Blue  swiss ball   Seated no money 0  Red; seated EOB   SLS Ball/wall glute      Manual Intervention (77310) Min: 20      DN      Prone PA 1 7    GISTM/STM 1 6 R lumbar and thoracic paraspinals   bilat hip PROM/stretching 1 4'    L knee mobs 1 3'    Hip belt mobs      Hip LA distraction            NMR re-education (68249)   Min:      Mf Activation- re-ed      TrA Re-ed activation      Glute Max re-ed activation      Prone martín Munozon            Therapeutic Activity (82744) Min:                                Therapeutic Exercise and NMR EXR  [x] (68624) Provided verbal/tactile cueing for activities related to strengthening, flexibility, endurance, ROM  for improvements in proximal hip and core control with self care, mobility, lifting and ambulation. [x] (98488) Provided verbal/tactile cueing for activities related to improving balance, coordination, kinesthetic sense, posture, motor skill, proprioception  to assist with core control in self care, mobility, lifting, and ambulation.      Therapeutic Activities:    [] (42600 or 55186) Provided verbal/tactile cueing for activities related to improving balance, coordination, kinesthetic sense, posture, motor skill, proprioception and motor activation to allow for proper function  with self care and ADLs  [] (26269) Provided training and instruction to the patient for proper core and proximal hip recruitment and positioning with ambulation re-education     Home Exercise Program:    [x] (55106) Reviewed/Progressed HEP activities related to strengthening, flexibility, endurance, ROM of core, proximal hip and LE for functional self-care, mobility, lifting and ambulation   [] (63028) Reviewed/Progressed HEP activities related to improving balance, coordination, kinesthetic sense, posture, motor skill, proprioception of core, proximal hip and LE for self care, mobility, lifting, and ambulation      Manual Treatments:  PROM / STM / Oscillations-Mobs:  G-I, II, III, IV (PA's, Inf., Post.)  [x] (84043) Provided manual therapy to mobilize proximal hip and LS spine soft tissue/joints for the purpose of modulating pain, promoting relaxation,  increasing ROM, reducing/eliminating soft tissue swelling/inflammation/restriction, improving soft tissue extensibility and allowing for proper ROM for normal function with self care, mobility, lifting and ambulation. Modalities:       Charges:  Timed Code Treatment Minutes: 45   Total Treatment Minutes: 45     [] EVAL (LOW) 66325 (typically 20 minutes face-to-face)  [] EVAL (MOD) 08787 (typically 30 minutes face-to-face)  [] EVAL (HIGH) 92619 (typically 45 minutes face-to-face)  [] RE-EVAL     [x] DS(07215) x   2  [] DRY NEEDLE 1 OR 2 MUSCLES  [] NMR (38823) x     [] DRY NEEDLE 3+ MUSCLES  [x] Manual (54125) x   1    [] TA (75330) x     [] Mech Traction (33932)  [] ES(attended) (20595)     [] ES (un) (17244):   [] VASO (28327)  [] Other:        GOALS:  Patient stated goal: improve walking and ability to do housework  [x] Progressing: [] Met: [] Not Met: [] Adjusted  Therapist goals for Patient:   Short Term Goals:  To be achieved in: 2 weeks  1. Independent in HEP and progression per patient tolerance, in order to prevent re-injury. [] Progressing: [x] Met: [] Not Met: [] Adjusted  2. Patient will have a decrease in pain to facilitate improvement in movement, function, and ADLs as indicated by Functional Deficits. [] Progressing: [x] Met: [] Not Met: [] Adjusted    Long Term Goals: To be achieved in: 6 weeks  1. Disability index score of 30% or less for the PHILIP or 55 or better on Foto to assist with reaching prior level of function. [] Progressing: [x] Met: [] Not Met: [] Adjusted  2. Patient will demonstrate increased AROM to WNL, good LS mobility, good hip ROM to allow for proper joint functioning as indicated by patients Functional Deficits. [x] Progressing: [] Met: [] Not Met: [] Adjusted  3. Patient will demonstrate an increase in Strength to good proximal hip and core activation to allow for proper functional mobility as indicated by patients Functional Deficits. [x] Progressing: [] Met: [] Not Met: [] Adjusted  4. Patient will return to standing and walking functional activities > 30 min without increased symptoms or restriction. [] Progressing: [x] Met: [] Not Met: [] Adjusted  5. Patient will report being able to perform housework with decreased symptoms or restriction, no greater than 2-3/10.(patient specific functional goal)    [x] Progressing: [] Met: [] Not Met: [] Adjusted     ASSESSMENT:  Lessening restriction along R lumbar paraspinals - more focused just along L2/3 thru L5/S1 paraspinals and deeper at multifidus. Did well with all strengthening. Challenged with increased spinal extension positions and fatigues quickly. Patient has been making good steady improvement in overall pain and ability to perform functional mobility with lessening discomfort. Needs further increased challenge with spinal extensor strengthening.  Patient would still benefit from further skilled PT services to address core and hip strength, as well as ROM and to further improve lumbar mobility. Treatment/Activity Tolerance:  [x] Patient tolerated treatment well [] Patient limited by fatique  [] Patient limited by pain  [] Patient limited by other medical complications  [] Other:     Overall Progression Towards Functional goals/ Treatment Progress Update:  [x] Patient is progressing as expected towards functional goals listed. [] Progression is slowed due to complexities/Impairments listed. [] Progression has been slowed due to co-morbidities. [] Plan just implemented, too soon to assess goals progression <30days   [] Goals require adjustment due to lack of progress  [] Patient is not progressing as expected and requires additional follow up with physician  [] Other:    Prognosis for POC: [x] Good [] Fair  [] Poor    Patient requires continued skilled intervention: [x] Yes  [] No        PLAN:  [x] Continue per plan of care [] Alter current plan (see comments)  [] Plan of care initiated [] Hold pending MD visit [] Discharge    Electronically signed by: Daryle Self, PT    Note: If patient does not return for scheduled/recommended follow up visits, this note will serve as a discharge from care along with the most recent update on progress.

## 2022-10-04 ENCOUNTER — APPOINTMENT (OUTPATIENT)
Dept: PHYSICAL THERAPY | Age: 87
End: 2022-10-04
Payer: MEDICARE

## 2022-10-06 ENCOUNTER — HOSPITAL ENCOUNTER (OUTPATIENT)
Dept: PHYSICAL THERAPY | Age: 87
Setting detail: THERAPIES SERIES
Discharge: HOME | End: 2022-10-06
Payer: MEDICARE

## 2022-10-06 PROCEDURE — 97140 MANUAL THERAPY 1/> REGIONS: CPT

## 2022-10-06 PROCEDURE — 97110 THERAPEUTIC EXERCISES: CPT

## 2022-10-06 NOTE — FLOWSHEET NOTE
[address]  Phone: [phone]   Fax:     [phone]    Physical Therapy Treatment Note/ Progress Report:       Date:  10/06/2022    Patient Name:  Lolis Delgado    :  1935  MRN: [id number]  Restrictions/Precautions:    Medical/Treatment Diagnosis Information:  Diagnosis: lumbar radiculopathy, back pain  Treatment Diagnosis: lumbar radiculopathy M54.16, back pain Q09.9  Insurance/Certification information:  PT Insurance Information: A[employer] Medicare  Physician Information:  Sanjuana Anderson MD  Plan of care signed (Y/N):     Date of Patient follow up with Physician: 2022     Progress Report: []  Yes  [x]  No     Date Range for reporting period:  Beginnin2022  Ending:     Progress report due (10 Rx/or 30 days whichever is less): 6480     Recertification due (POC duration/ or 90 days whichever is less): 2022     Visit # Insurance Allowable Auth Needed   16 A[employer]na Medicare []Yes    []No     Pain level:  4/10   Functional Scale: Foto FS Score 56, PHILIP 23.7%   Date Assessed: 2022    SUBJECTIVE:  Patient reports that she has been doing ok since last session. Feeling more in low back; but is achy and not sharp or shooting pain. Is only bad when she is does something she shouldn't (vacuum)        OBJECTIVE: Focused session on manual therapy and mobility with decreased treatment time available.   Observation: ambulating with SPC and decreased L knee flexion and stance time on L  Test measurements:       Comments   Lumbar Flex 60     Lumbar Ext 20        ROM LEFT RIGHT Comments   Lumbar Side Bend 15 15 Stretch on R   Lumbar Rotation         Quadrant         Hip Flexion WNL WNL    Hip Abd         Hip ER Mild limitation Mild limitation     Hip IR Mild limitation Mild limitation     Hip Extension         Knee Extension         Knee Flex         Hamstring Flex         Piriformis         Simona Houston test                       Myotomes/Strength Normal Abnormal Comments   [x]ALL NORMAL         Hip Abd     L 3/5   Hip Ext     L 3/5   Hip flexion (L1-L2 femoral) []  []  L 3/5       RESTRICTIONS/PRECAUTIONS: none    Exercises/Interventions:     Therapeutic Ex (12303)   Min: 25 sets/sec reps notes   Hip Ext-POT 1 10 bilat   Prone glute max activation 10 10 bilat   SLR bilat 1 10 bilat   Standing hip flexion marches 2 10 bilat   Standing hip abduction 1 10 L , cues form   TA activation in hooklying 10 10    TA activation + hooklying march 1 10 bilat   LTR 1 10    Bridge Very small lift   Band pull down in modified tandem 1 10 red   SL hip abd/clam      Lateral band pull      Lateral band walk      Bosu Lunge      Slide lunge       Ham string stretch      Hip Flex stretch      Glute Stretch      Seated forward ball roll 10 10 Blue  swiss ball   Seated no money 0  Red; seated EOB   SLS Ball/wall glute      Manual Intervention (99171) Min: 20      DN      Prone PA 1 7    GISTM/STM 1 6 R lumbar and thoracic paraspinals   bilat hip PROM/stretching 1 4'    L knee mobs 1 3'    Hip belt mobs      Hip LA distraction            NMR re-education (13837)   Min:      Mf Activation- re-ed      TrA Re-ed activation      Glute Max re-ed activation      Prone martín Munozon            Therapeutic Activity (33318) Min:                                Therapeutic Exercise and NMR EXR  [x] (37964) Provided verbal/tactile cueing for activities related to strengthening, flexibility, endurance, ROM  for improvements in proximal hip and core control with self care, mobility, lifting and ambulation. [x] (87173) Provided verbal/tactile cueing for activities related to improving balance, coordination, kinesthetic sense, posture, motor skill, proprioception  to assist with core control in self care, mobility, lifting, and ambulation.      Therapeutic Activities:    [] (70447 or ) Provided verbal/tactile cueing for activities related to improving balance, coordination, kinesthetic sense, posture, motor skill, proprioception and motor activation to allow for proper function  with self care and ADLs  [] (00664) Provided training and instruction to the patient for proper core and proximal hip recruitment and positioning with ambulation re-education     Home Exercise Program:    [x] (70393) Reviewed/Progressed HEP activities related to strengthening, flexibility, endurance, ROM of core, proximal hip and LE for functional self-care, mobility, lifting and ambulation   [] (39705) Reviewed/Progressed HEP activities related to improving balance, coordination, kinesthetic sense, posture, motor skill, proprioception of core, proximal hip and LE for self care, mobility, lifting, and ambulation      Manual Treatments:  PROM / STM / Oscillations-Mobs:  G-I, II, III, IV (PA's, Inf., Post.)  [x] (09761) Provided manual therapy to mobilize proximal hip and LS spine soft tissue/joints for the purpose of modulating pain, promoting relaxation,  increasing ROM, reducing/eliminating soft tissue swelling/inflammation/restriction, improving soft tissue extensibility and allowing for proper ROM for normal function with self care, mobility, lifting and ambulation. Modalities:       Charges:  Timed Code Treatment Minutes: 45   Total Treatment Minutes: 45     [] EVAL (LOW) 68988 (typically 20 minutes face-to-face)  [] EVAL (MOD) 80005 (typically 30 minutes face-to-face)  [] EVAL (HIGH) 51056 (typically 45 minutes face-to-face)  [] RE-EVAL     [x] BD(01111) x   2  [] DRY NEEDLE 1 OR 2 MUSCLES  [] NMR (83607) x     [] DRY NEEDLE 3+ MUSCLES  [x] Manual (79926) x   1    [] TA (27267) x     [] Mech Traction (23961)  [] ES(attended) (84121)     [] ES (un) (35357):   [] VASO (86949)  [] Other:        GOALS:  Patient stated goal: improve walking and ability to do housework  [x] Progressing: [] Met: [] Not Met: [] Adjusted  Therapist goals for Patient:   Short Term Goals:  To be achieved in: 2 weeks  1. Independent in HEP and progression per patient tolerance, in order to prevent re-injury. [] Progressing: [x] Met: [] Not Met: [] Adjusted  2. Patient will have a decrease in pain to facilitate improvement in movement, function, and ADLs as indicated by Functional Deficits. [] Progressing: [x] Met: [] Not Met: [] Adjusted    Long Term Goals: To be achieved in: 6 weeks  1. Disability index score of 30% or less for the PHILIP or 55 or better on Foto to assist with reaching prior level of function. [] Progressing: [x] Met: [] Not Met: [] Adjusted  2. Patient will demonstrate increased AROM to WNL, good LS mobility, good hip ROM to allow for proper joint functioning as indicated by patients Functional Deficits. [x] Progressing: [] Met: [] Not Met: [] Adjusted  3. Patient will demonstrate an increase in Strength to good proximal hip and core activation to allow for proper functional mobility as indicated by patients Functional Deficits. [x] Progressing: [] Met: [] Not Met: [] Adjusted  4. Patient will return to standing and walking functional activities > 30 min without increased symptoms or restriction. [] Progressing: [x] Met: [] Not Met: [] Adjusted  5. Patient will report being able to perform housework with decreased symptoms or restriction, no greater than 2-3/10.(patient specific functional goal)    [x] Progressing: [] Met: [] Not Met: [] Adjusted     ASSESSMENT:  Lessening restriction along R lumbar paraspinals - more focused just along L2/3 thru L5/S1 paraspinals and deeper at multifidus. Did well with all strengthening. Challenged with increased spinal extension positions and fatigues quickly. Patient has been making good steady improvement in overall pain and ability to perform functional mobility with lessening discomfort. Will decrease frequency to 1x week for 2-3 weeks. May be ready to d/c at that time.        Treatment/Activity Tolerance:  [x] Patient tolerated treatment well [] Patient limited by fatique  [] Patient limited by pain  [] Patient limited by other medical complications  [] Other:     Overall Progression Towards Functional goals/ Treatment Progress Update:  [x] Patient is progressing as expected towards functional goals listed. [] Progression is slowed due to complexities/Impairments listed. [] Progression has been slowed due to co-morbidities. [] Plan just implemented, too soon to assess goals progression <30days   [] Goals require adjustment due to lack of progress  [] Patient is not progressing as expected and requires additional follow up with physician  [] Other:    Prognosis for POC: [x] Good [] Fair  [] Poor    Patient requires continued skilled intervention: [x] Yes  [] No        PLAN:  [x] Continue per plan of care [] Alter current plan (see comments)  [] Plan of care initiated [] Hold pending MD visit [] Discharge    Electronically signed by: Morgan Cornell PT    Note: If patient does not return for scheduled/recommended follow up visits, this note will serve as a discharge from care along with the most recent update on progress.

## 2022-10-10 ENCOUNTER — NURSE ONLY (OUTPATIENT)
Dept: CARDIOLOGY CLINIC | Age: 87
End: 2022-10-10
Payer: MEDICARE

## 2022-10-10 DIAGNOSIS — Z45.09 ENCOUNTER FOR ELECTRONIC ANALYSIS OF REVEAL EVENT RECORDER: ICD-10-CM

## 2022-10-10 DIAGNOSIS — I48.91 ATRIAL FIBRILLATION WITH RAPID VENTRICULAR RESPONSE (HCC): Primary | ICD-10-CM

## 2022-10-10 PROCEDURE — 93298 REM INTERROG DEV EVAL SCRMS: CPT | Performed by: INTERNAL MEDICINE

## 2022-10-10 PROCEDURE — G2066 INTER DEVC REMOTE 30D: HCPCS | Performed by: INTERNAL MEDICINE

## 2022-10-11 NOTE — PROGRESS NOTES
We received a remote transmission from patient's monitor at home. Remote Linq report shows short SVT and AF w RVR. Pt showed no symptoms. EP physician to review. We will continue to monitor remotely. Implanted for stroke. Pt is on Xarelto. End of 31-day monitoring period 10-10-22.

## 2022-10-11 NOTE — PROGRESS NOTES
[address]   Cardiac Follow up    Referring Provider:  Brijesh Flores DO   Chief Complaint   Patient presents with    Coronary Artery Disease    Hypertension    Hyperlipidemia         History of Present Illness:  Shubham Watts is an 80 y.o. 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 She has had 2 strokes ([place]). ILR implanted 8/9/19 with Dr Jeni Ramirez. She was admitted May 2020 with chest tightness and dyspnea. LHC showed high output CHF due to symptomatic anemia. Taken off Plavix and had GI consult with EGD/colonoscopy. Today she reports that they are leaving on Sunday for [facility] for the winter. No complaints of CP, SOB \"every once in a while\". She feels it is due to the liquid build up but the lasix help. No palpitations, or dizziness. She is not 100% but feeling well. She had her carotid artery doppler done today at [facility] due to some left eye plaque but it is not any worse. It was ordered by Dr Bridgett Bass. They are going to send results over to us. Past Medical History:   has a past medical history of Aortic valve disease, CAD (coronary artery disease), CVA (cerebral infarction), and Hypertension. Surgical History:   has a past surgical history that includes Coronary angioplasty with stent (12/22/2004); Total knee arthroplasty (Right, 7/15/2013); Cataract removal with implant (Left, 10/9/2015); Cardiac catheterization (11/19/2015); Aortic valve replacement (12/4/2015); Upper gastrointestinal endoscopy (N/A, 5/8/2020); and Colonoscopy (N/A, 5/8/2020). Social History:   reports that she has never smoked. She has never used smokeless tobacco. She reports that she does not drink alcohol and does not use drugs. Family History:  family history includes Heart Disease in her father. Home Medications:  Prior to Admission medications    Medication Sig Start Date End Date Taking?  Authorizing Provider   GENEVIEVE Ying (17 Fe) MG tablet TAKE ONE TABLET BY MOUTH EVERY DAY WITH FOOD 7/11/22  Yes Historical Provider, MD   furosemide (LASIX) 20 MG tablet Take 1 tablet by mouth 2 times daily  Patient taking differently: Take 20 mg by mouth daily 6/28/22  Yes Supriya Tierney MD   amLODIPine (NORVASC) 5 MG tablet Take 1 tablet by mouth daily 6/28/22  Yes Supriya Tierney MD   pantoprazole (PROTONIX) 40 MG tablet TAKE ONE TABLET BY MOUTH DAILY AS NEEDED 6/1/22  Yes Supriya Tierney MD   Coenzyme Q10 (COQ-10 PO) Take by mouth   Yes Historical Provider, MD      Allergies:  Lipitor and Penicillins     Review of Systems:   Constitutional: there has been no unanticipated weight loss. There's been no change in energy level, sleep pattern, or activity level. Eyes: No visual changes or diplopia. No scleral icterus. ENT: No Headaches, hearing loss or vertigo. No mouth sores or sore throat. Cardiovascular: Reviewed in HPI  Respiratory: No cough or wheezing, no sputum production. No hematemesis. Gastrointestinal: No abdominal pain, appetite loss, blood in stools. No change in bowel or bladder habits. Genitourinary: No dysuria, trouble voiding, or hematuria. Musculoskeletal:  No gait disturbance, weakness or joint complaints. Integumentary: No rash or pruritis. Neurological: No headache, diplopia, change in muscle strength, numbness or tingling. No change in gait, balance, coordination, mood, affect, memory, mentation, behavior. Psychiatric: No anxiety, no depression. Endocrine: No malaise, fatigue or temperature intolerance. No excessive thirst, fluid intake, or urination. No tremor. Hematologic/Lymphatic: No abnormal bruising or bleeding, blood clots or swollen lymph nodes. Allergic/Immunologic: No nasal congestion or hives.     Physical Examination:    Vitals:    10/28/22 0923   BP: 136/84   Pulse: 59   SpO2: 99%          Constitutional and General Appearance: alert,oriented, no acute distress  Skin:good turgor,intact without lesions  HEENT: EOMI ,normal  Neck:no JVD    Respiratory:  Normal excursion and expansion without use of accessory muscles  Resp Auscultation: Normal breath sounds without dullness  Cardiovascular: The apical impulses not displaced  II/IV JIGAR, Diastolic Murmur  - JIGAR seems later peaking   Cervical veins are not engorged  The carotid upstroke is normal in amplitude and contour without delay or bruit  Peripheral pulses are symmetrical and full  There is no clubbing, cyanosis of the extremities. No edema  Femoral Arteries: 2+ and equal  Pedal Pulses: 2+ and equal   Abdomen:  No masses or tenderness  Liver/Spleen: No Abnormalities Noted  Neurological/Psychiatric:  Alert and oriented in all spheres  Moves all extremities well  Exhibits normal gait balance and coordination  No abnormalities of mood, affect, memory, mentation, or behavior are noted    [address] [place] [address] 5/7/2020  Cath with 2 plus AI,no significant AV gradient. LVEDP 25 c/w high output CHF  EF 60  40% left main  40% in stent LAD,40% ostial CFX  30% RCA     Imp- high output CHF due to symptomatic anemia     Will give 1 unit of PRBC,IV iron  GI evaluation     ECHO 10/8/21  Summary  Normal left ventricle size and systolic function with an estimated ejection fraction of 60%. No regional wall motion abnormalities are seen. There is mild concentric left ventricular hypertrophy. E/e\"= 12. Diastolic filling parameters suggests grade I diastolic dysfunction. Mild to moderate mitral regurgitation  The left atrium is mildly dilated. A bioprosthetic aortic valve appears well seated with a maximum gradient of 46 mmHg and a mean gradient of 27 mmHg. Aortic valve area of 1.34cm2. Trivial aortic regurgitation. Moderate tricuspid regurgitation. RVSP 28mmHg. The right atrium is mildly dilated. IVC size is normal (<2.1cm) and collapses > 50% with respiration consistent with normal RA pressure (3mmHg).     Echo 7/28/22  Summary   Normal left ventricle size and systolic function with an estimated ejection   fraction of 60%. No regional wall motion abnormalities are seen. There is mild concentric left ventricular hypertrophy. E/e\"= 14. Diastolic filling parameters suggests grade II diastolic   dysfunction. Moderate mitral regurgitation. The left atrium is mildly dilated. A bioprosthetic aortic valve appears well seated with a maximum gradient of   38 mmHg and a mean gradient of 23 mmHg. Aortic valve area of 1.3cm2. Trivial   aortic regurgitation. Moderate tricuspid regurgitation. RVSP 44mmHg. PHTN. The right atrium is mildly dilated. Mild pulmonic regurgitation. IVC size is normal (<2.1cm) and collapses > 50% with respiration consistent   with normal RA pressure (3mmHg). Assessment:    CAD  Stable    -[facility] 2004> PCI of proximal LAD  -[facility] 2013> Cath with LM normal, LAD mid 30%, distal 40%, D1-40%, D4-50%(small), widely patent proximal stent, Cx Ostial 20%. RCA Mid 30% X2 and LVG 60%. Medically managed  -[facility] 2015> No flow-significant coronary stenoses are identified. The left anterior descending stent is widely patent. -[facility] 5/7/20> 2+ AI, EF 60, 40% LM, 40% in-stent LAD,40% ostial CFX, 30% RCA, Imp- high output CHF due to symptomatic anemia     Aortic Valve Replacement   12/4/15 AVR Mosaic porcine performed by Dr. Virginia Damian  Echo 10/21- well seated  Echo 7/28/22 - well seated       Hypertension elevated in office today   /84 (Site: Left Upper Arm, Position: Sitting, Cuff Size: Large Adult)   Pulse 59   Ht 5' 3\" (1.6 m)   Wt 185 lb 11.2 oz (84.2 kg)   SpO2 99%   BMI 32.90 kg/m²   -amlodipine 5 mg daily      Atrial fibrillation, history of  Has ILR now implanted by Dr Bridgette Hodge. Detected while exercising during cardiac rehab  High dose Xarelto gave her bruising. Takes baby asa. EKG 9/26/16> A. fib  EKG 7/3/18> Sinus rhythm - a fib resolved.   EKG 5/7/20> NSR 78  On amio 200 mg - Xarelto was stopped   Cardioversion 12/16/2020  Sinus rhythm 4/29/21    Poor candidate for anticoagulation    Hyperlipidemia  9/18/20  TG 82 HDL 79   Previously took Crestor 5mg -unknown why stopped  She states she had hives from Lipitor. CHF   SOB improving  During admission 5/22/20 d/t symptomatic anemia  -on lasix   -echo 7/28/22> EF 60%, moderate mitral regurgitation       Plan:    [address] has a stable cardiac status. Cardiac test and lab results personally reviewed by me during this office visit and discussed. Start 81mg asa daily   Continue risk factor modifications. Call for any change in symptoms. Return for regular follow up in 6 months       I appreciate the opportunity of cooperating in the care of this individual.    Waylon Carter M.D., [facility]      Patient's problem list, medications, allergies, past medical, surgical, social and family histories were reviewed and updated as appropriate. Scribe Attestation: This note was scribed in the presence of Dr. Deena Carter  by Caridad Gillette RN. The scribe's documentation has been prepared under my direction and personally reviewed by me in its entirety. I confirm that the note above accurately reflects all work, treatment, procedures, and medical decision making performed by me.

## 2022-10-12 ENCOUNTER — HOSPITAL ENCOUNTER (OUTPATIENT)
Dept: PHYSICAL THERAPY | Age: 87
Setting detail: THERAPIES SERIES
Discharge: HOME | End: 2022-10-12
Payer: MEDICARE

## 2022-10-12 PROCEDURE — 97140 MANUAL THERAPY 1/> REGIONS: CPT

## 2022-10-12 PROCEDURE — 97110 THERAPEUTIC EXERCISES: CPT

## 2022-10-12 NOTE — PLAN OF CARE
Paxton Olson  Phone: [phone]   Fax:     [phone]        Physical Therapy Re-Certification Plan of Nii Malhotra      Dear Dr Magda Lockhart  ,    We had the pleasure of treating the following patient for physical therapy services at [address] [place] [address]. A summary of our findings can be found in the updated assessment below. This includes our plan of care. If you have any questions or concerns regarding these findings, please do not hesitate to contact me at the office phone number checked above.   Thank you for the referral.     Physician Signature:________________________________Date:__________________  By signing above (or electronic signature), therapists plan is approved by physician    Date Range Of Visits: 2022 thru 10/12/2022  Total Visits to Date: 16  Overall Response to Treatment:   [x]Patient is responding well to treatment and improvement is noted with regards  to goals   []Patient should continue to improve in reasonable time if they continue HEP   []Patient has plateaued and is no longer responding to skilled PT intervention    []Patient is getting worse and would benefit from return to referring MD   []Patient unable to adhere to initial POC   []Other:         Physical Therapy Treatment Note/ Progress Report:       Date:  10/12/2022    Patient Name:  Lilia Dietrich    :  1935  MRN: [id number]  Restrictions/Precautions:    Medical/Treatment Diagnosis Information:  Diagnosis: lumbar radiculopathy, back pain  Treatment Diagnosis: lumbar radiculopathy M54.16, back pain V29.5  Insurance/Certification information:  PT Insurance Information: A[employer]na Medicare  Physician Information:  Lauren Esparza MD  Plan of care signed (Y/N):     Date of Patient follow up with Physician: 2022     Progress Report: [x]  Yes  []  No     Date Range for reporting period:  Beginning: 9/1/2022  Ending: 10/12/2022    Progress report due (10 Rx/or 30 days whichever is less): 39/3/8898    Recertification due (POC duration/ or 90 days whichever is less): 11/5/2022     Visit # 800 [facility] Medicare []Yes    []No     Pain level:  2/10   Functional Scale: Foto FS Score  58, PHILIP 20.9%   Date Assessed: 10/12/2022    SUBJECTIVE:  Patient reports that she continues to feel like back is doing better. Only notes that her back will be somewhat achy and fatigues/weak if she stands for an extended period of time. Can stand for up 30 mins now. OBJECTIVE: Focused session on manual therapy and mobility with decreased treatment time available.   Observation: ambulating with SPC and decreased L knee flexion and stance time on L  Test measurements:       Comments   Lumbar Flex 70     Lumbar Ext 25        ROM LEFT RIGHT Comments   Lumbar Side Bend 15 15 Stretch on R   Lumbar Rotation         Quadrant         Hip Flexion WNL WNL    Hip Abd         Hip ER Mild limitation Mild limitation     Hip IR Mild limitation Mild limitation     Hip Extension         Knee Extension         Knee Flex         Hamstring Flex         Piriformis         Choco test                       Myotomes/Strength Normal Abnormal Comments   [x]ALL NORMAL         Hip Abd     L 3+/5, R 3+/5   Hip Ext     L 3+/5, R 4-/5   Hip flexion (L1-L2 femoral) []  []  L 3+/5, R 4-/5       RESTRICTIONS/PRECAUTIONS: none    Exercises/Interventions:     Therapeutic Ex (12930)   Min: 25 sets/sec reps notes   Hip Ext-POT 1 10 bilat   Prone glute max activation 10 10 bilat   SLR bilat 1 10 bilat   Standing hip flexion marches 2 10 bilat   Standing hip abduction 1 10 L , cues form   TA activation in hooklying 10 10    TA activation + hooklying march 1 10 bilat   LTR 1 10    Bridge Very small lift   Band pull down in modified tandem 1 10 red   SL hip abd/clam      Lateral band pull      Lateral band walk      Bosu Lunge      Slide lunge       Ham string stretch      Hip Flex stretch      Glute Stretch      Seated forward ball roll 10 10 Blue  swiss ball   Seated no money 0  Red; seated EOB   SLS Ball/wall glute      Manual Intervention (48226) Min: 20      DN      Prone PA 1 7    GISTM/STM 1 6 R lumbar and thoracic paraspinals   bilat hip PROM/stretching 1 4'    L knee mobs 1 3'    Hip belt mobs      Hip LA distraction            NMR re-education (12082)   Min:      Mf Activation- re-ed      TrA Re-ed activation      Glute Max re-ed activation      Prone martín Baca            Therapeutic Activity (26544) Min:                                Therapeutic Exercise and NMR EXR  [x] (34611) Provided verbal/tactile cueing for activities related to strengthening, flexibility, endurance, ROM  for improvements in proximal hip and core control with self care, mobility, lifting and ambulation. [x] (11545) Provided verbal/tactile cueing for activities related to improving balance, coordination, kinesthetic sense, posture, motor skill, proprioception  to assist with core control in self care, mobility, lifting, and ambulation.      Therapeutic Activities:    [] (90638 or 79193) Provided verbal/tactile cueing for activities related to improving balance, coordination, kinesthetic sense, posture, motor skill, proprioception and motor activation to allow for proper function  with self care and ADLs  [] (76073) Provided training and instruction to the patient for proper core and proximal hip recruitment and positioning with ambulation re-education     Home Exercise Program:    [x] (77406) Reviewed/Progressed HEP activities related to strengthening, flexibility, endurance, ROM of core, proximal hip and LE for functional self-care, mobility, lifting and ambulation   [] (02522) Reviewed/Progressed HEP activities related to improving balance, coordination, kinesthetic sense, posture, motor skill, proprioception of core, proximal hip and LE for self care, mobility, lifting, and ambulation      Manual Treatments:  PROM / STM / Oscillations-Mobs:  G-I, II, III, IV (PA's, Inf., Post.)  [x] (76416) Provided manual therapy to mobilize proximal hip and LS spine soft tissue/joints for the purpose of modulating pain, promoting relaxation,  increasing ROM, reducing/eliminating soft tissue swelling/inflammation/restriction, improving soft tissue extensibility and allowing for proper ROM for normal function with self care, mobility, lifting and ambulation. Modalities:       Charges:  Timed Code Treatment Minutes: 45   Total Treatment Minutes: 45     [] EVAL (LOW) 87717 (typically 20 minutes face-to-face)  [] EVAL (MOD) 40970 (typically 30 minutes face-to-face)  [] EVAL (HIGH) 21810 (typically 45 minutes face-to-face)  [] RE-EVAL     [x] ZG(66341) x   2  [] DRY NEEDLE 1 OR 2 MUSCLES  [] NMR (59868) x     [] DRY NEEDLE 3+ MUSCLES  [x] Manual (67693) x   1    [] TA (78592) x     [] Mech Traction (99034)  [] ES(attended) (65311)     [] ES (un) (80852):   [] VASO (11785)  [] Other:        GOALS:  Patient stated goal: improve walking and ability to do housework  [] Progressing: [x] Met: [] Not Met: [] Adjusted  Therapist goals for Patient:   Short Term Goals: To be achieved in: 2 weeks  1. Independent in HEP and progression per patient tolerance, in order to prevent re-injury. [] Progressing: [x] Met: [] Not Met: [] Adjusted  2. Patient will have a decrease in pain to facilitate improvement in movement, function, and ADLs as indicated by Functional Deficits. [] Progressing: [x] Met: [] Not Met: [] Adjusted    Long Term Goals: To be achieved in: 6 weeks  1. Disability index score of 30% or less for the PHILIP or 55 or better on Foto to assist with reaching prior level of function. [] Progressing: [x] Met: [] Not Met: [] Adjusted  2.  Patient will demonstrate increased AROM to WNL, good LS mobility, good hip ROM to allow for proper joint functioning as indicated by patients Functional Deficits. [x] Progressing: [] Met: [] Not Met: [] Adjusted  3. Patient will demonstrate an increase in Strength to good proximal hip and core activation to allow for proper functional mobility as indicated by patients Functional Deficits. [x] Progressing: [] Met: [] Not Met: [] Adjusted  4. Patient will return to standing and walking functional activities > 30 min without increased symptoms or restriction. [] Progressing: [x] Met: [] Not Met: [] Adjusted  5. Patient will report being able to perform housework with decreased symptoms or restriction, no greater than 2-3/10.(patient specific functional goal)    [] Progressing: [x] Met: [] Not Met: [] Adjusted     ASSESSMENT:  Patient continues to progress in overall ROM and strength with decreasing pain in lumbar spine. Patient able to stand longer with less pain overall, just achy and fatigued with standing longer than 30 mins. Patient will benefit from 2-3 additional sessions to further progress strength before she leaves to go to [facility] for the winter. Treatment/Activity Tolerance:  [x] Patient tolerated treatment well [] Patient limited by fatique  [] Patient limited by pain  [] Patient limited by other medical complications  [] Other:     Overall Progression Towards Functional goals/ Treatment Progress Update:  [x] Patient is progressing as expected towards functional goals listed. [] Progression is slowed due to complexities/Impairments listed. [] Progression has been slowed due to co-morbidities.   [] Plan just implemented, too soon to assess goals progression <30days   [] Goals require adjustment due to lack of progress  [] Patient is not progressing as expected and requires additional follow up with physician  [] Other:    Prognosis for POC: [x] Good [] Fair  [] Poor    Patient requires continued skilled intervention: [x] Yes  [] No        PLAN:  [x] Continue per plan of care [] Alter current plan (see comments)  [] Plan of care initiated [] Hold pending MD visit [] Discharge    Electronically signed by: Elizabeth Covarrubias PT    Note: If patient does not return for scheduled/recommended follow up visits, this note will serve as a discharge from care along with the most recent update on progress.

## 2022-10-19 ENCOUNTER — HOSPITAL ENCOUNTER (OUTPATIENT)
Dept: PHYSICAL THERAPY | Age: 87
Setting detail: THERAPIES SERIES
Discharge: HOME | End: 2022-10-19
Payer: MEDICARE

## 2022-10-19 PROCEDURE — 97110 THERAPEUTIC EXERCISES: CPT

## 2022-10-19 PROCEDURE — 97140 MANUAL THERAPY 1/> REGIONS: CPT

## 2022-10-19 NOTE — FLOWSHEET NOTE
[address]  Phone: [phone]   Fax:     [phone]      Physical Therapy Treatment Note/ Progress Report:       Date:  10/19/2022    Patient Name:  Nirav Slater    :  1935  MRN: [id number]  Restrictions/Precautions:    Medical/Treatment Diagnosis Information:  Diagnosis: lumbar radiculopathy, back pain  Treatment Diagnosis: lumbar radiculopathy M54.16, back pain N74.6  Insurance/Certification information:  PT Insurance Information: A[employer]na Medicare  Physician Information:  Jorge Ornelas MD  Plan of care signed (Y/N):     Date of Patient follow up with Physician: 2022     Progress Report: [x]  Yes  []  No     Date Range for reporting period:  Beginnin2022  Ending: 10/12/2022    Progress report due (10 Rx/or 30 days whichever is less): 04/3/3049    Recertification due (POC duration/ or 90 days whichever is less): 2022     Visit # Insurance Allowable Auth Needed   18 Aetna Medicare []Yes    []No     Pain level:  2/10   Functional Scale: Foto FS Score  58, PHILIP 20.9%   Date Assessed: 10/12/2022    SUBJECTIVE:  Patient reports that she is having increased pain in center of low back today (points to L4/5 area). States she has been doing really well, but no sure what happened. Reports that she had to reach up to cupboard and also had to take her  to ER and had to sit in w/ch from 10 am to 4 pm. Not sure which one of the things contributed to increase in pain; but was doing well before this. .         OBJECTIVE: Focused session on manual therapy and mobility to decrease pain and improve upright posture as patient with increased trunk flexion today with ambulation.    Observation: ambulating with SPC and decreased L knee flexion and stance time on L, increased trunk flexion  Test measurements:       Comments   Lumbar Flex 70     Lumbar Ext 25        ROM LEFT RIGHT Comments   Lumbar Side Bend 15 15 Stretch on R   Lumbar Rotation         Quadrant         Hip Flexion WNL WNL    Hip Abd         Hip ER Mild limitation Mild limitation     Hip IR Mild limitation Mild limitation     Hip Extension         Knee Extension         Knee Flex         Hamstring Flex         Piriformis         Choco test                       Myotomes/Strength Normal Abnormal Comments   [x]ALL NORMAL         Hip Abd     L 3+/5, R 3+/5   Hip Ext     L 3+/5, R 4-/5   Hip flexion (L1-L2 femoral) []  []  L 3+/5, R 4-/5       RESTRICTIONS/PRECAUTIONS: none    Exercises/Interventions:     Therapeutic Ex (92044)   Min: 25 sets/sec reps notes   Hip Ext-POT 1 10 bilat   Prone glute max activation 10 10 bilat   SLR bilat 1 10 bilat   Standing hip flexion marches 2 10 bilat   Standing hip abduction 1 10 L , cues form   TA activation  + glute in hooklying 10 10    TA activation + hooklying march 1 10 bilat   LTR 1 10    Bridge Very small lift   Band pull down in modified tandem red   STS from EOT with TA activation 1 5 Improved discomfort in l/spine with TA activation   Lateral band pull      Lateral band walk      Bosu Lunge      Slide lunge       Ham string stretch      Hip Flex stretch      Glute Stretch      Seated forward ball roll 0  Blue  swiss ball   Seated no money 0  Red; seated EOB   SLS Ball/wall glute      Manual Intervention (53042) Min: 20      DN      Prone PA 1 7    GISTM/STM 1 6 R lumbar and thoracic paraspinals   bilat hip PROM/stretching 1 4'    L knee mobs 1 0    Hip belt mobs      Hip LA distraction            NMR re-education (71826)   Min:      Mf Activation- re-ed      TrA Re-ed activation      Glute Max re-ed activation      Prone martín Munozon            Therapeutic Activity (05846) Min:                                Therapeutic Exercise and NMR EXR  [x] (17395) Provided verbal/tactile cueing for activities related to strengthening, flexibility, endurance, ROM  for improvements in proximal hip and core control with self care, mobility, lifting and ambulation. [x] (10889) Provided verbal/tactile cueing for activities related to improving balance, coordination, kinesthetic sense, posture, motor skill, proprioception  to assist with core control in self care, mobility, lifting, and ambulation. Therapeutic Activities:    [] (47221 or 42261) Provided verbal/tactile cueing for activities related to improving balance, coordination, kinesthetic sense, posture, motor skill, proprioception and motor activation to allow for proper function  with self care and ADLs  [] (69504) Provided training and instruction to the patient for proper core and proximal hip recruitment and positioning with ambulation re-education     Home Exercise Program:    [x] (74843) Reviewed/Progressed HEP activities related to strengthening, flexibility, endurance, ROM of core, proximal hip and LE for functional self-care, mobility, lifting and ambulation   [] (70068) Reviewed/Progressed HEP activities related to improving balance, coordination, kinesthetic sense, posture, motor skill, proprioception of core, proximal hip and LE for self care, mobility, lifting, and ambulation      Manual Treatments:  PROM / STM / Oscillations-Mobs:  G-I, II, III, IV (PA's, Inf., Post.)  [x] (10262) Provided manual therapy to mobilize proximal hip and LS spine soft tissue/joints for the purpose of modulating pain, promoting relaxation,  increasing ROM, reducing/eliminating soft tissue swelling/inflammation/restriction, improving soft tissue extensibility and allowing for proper ROM for normal function with self care, mobility, lifting and ambulation.      Modalities:       Charges:  Timed Code Treatment Minutes: 45   Total Treatment Minutes: 45     [] EVAL (LOW) 50883 (typically 20 minutes face-to-face)  [] EVAL (MOD) 58117 (typically 30 minutes face-to-face)  [] EVAL (HIGH) 85730 (typically 45 minutes face-to-face)  [] RE-EVAL     [x] GE(63413) x   2  [] DRY NEEDLE 1 OR 2 MUSCLES  [] NMR (51408) x     [] DRY NEEDLE 3+ MUSCLES  [x] Manual (93226) x   1    [] TA (94506) x     [] Mech Traction (05662)  [] ES(attended) (78265)     [] ES (un) (99928):   [] VASO (57614)  [] Other:        GOALS:  Patient stated goal: improve walking and ability to do housework  [] Progressing: [x] Met: [] Not Met: [] Adjusted  Therapist goals for Patient:   Short Term Goals: To be achieved in: 2 weeks  1. Independent in HEP and progression per patient tolerance, in order to prevent re-injury. [] Progressing: [x] Met: [] Not Met: [] Adjusted  2. Patient will have a decrease in pain to facilitate improvement in movement, function, and ADLs as indicated by Functional Deficits. [] Progressing: [x] Met: [] Not Met: [] Adjusted    Long Term Goals: To be achieved in: 6 weeks  1. Disability index score of 30% or less for the PHILIP or 55 or better on Foto to assist with reaching prior level of function. [] Progressing: [x] Met: [] Not Met: [] Adjusted  2. Patient will demonstrate increased AROM to WNL, good LS mobility, good hip ROM to allow for proper joint functioning as indicated by patients Functional Deficits. [x] Progressing: [] Met: [] Not Met: [] Adjusted  3. Patient will demonstrate an increase in Strength to good proximal hip and core activation to allow for proper functional mobility as indicated by patients Functional Deficits. [x] Progressing: [] Met: [] Not Met: [] Adjusted  4. Patient will return to standing and walking functional activities > 30 min without increased symptoms or restriction. [] Progressing: [x] Met: [] Not Met: [] Adjusted  5. Patient will report being able to perform housework with decreased symptoms or restriction, no greater than 2-3/10.(patient specific functional goal)    [] Progressing: [x] Met: [] Not Met: [] Adjusted     ASSESSMENT:  Patient with increased discomfort along SP of L4/5 and L5/S1 today.  Worked on decreasing soft tissue restriction in area and restoring joint mobility of segments. Focus on improving TA and glute activation. Increased education on TA activation with transition movements to decrease pain with patient reporting less pain with STS with utilizing TA activation. Patient with improved trunk posture with ambulation at conclusion and not as flexed as she was when she arrived. Good reduction in discomfort at conclusion. Treatment/Activity Tolerance:  [x] Patient tolerated treatment well [] Patient limited by fatique  [] Patient limited by pain  [] Patient limited by other medical complications  [] Other:     Overall Progression Towards Functional goals/ Treatment Progress Update:  [x] Patient is progressing as expected towards functional goals listed. [] Progression is slowed due to complexities/Impairments listed. [] Progression has been slowed due to co-morbidities. [] Plan just implemented, too soon to assess goals progression <30days   [] Goals require adjustment due to lack of progress  [] Patient is not progressing as expected and requires additional follow up with physician  [] Other:    Prognosis for POC: [x] Good [] Fair  [] Poor    Patient requires continued skilled intervention: [x] Yes  [] No        PLAN:  [x] Continue per plan of care [] Alter current plan (see comments)  [] Plan of care initiated [] Hold pending MD visit [] Discharge    Electronically signed by: Damien Patterson PT    Note: If patient does not return for scheduled/recommended follow up visits, this note will serve as a discharge from care along with the most recent update on progress.

## 2022-10-26 ENCOUNTER — HOSPITAL ENCOUNTER (OUTPATIENT)
Dept: PHYSICAL THERAPY | Age: 87
Setting detail: THERAPIES SERIES
Discharge: HOME | End: 2022-10-26
Payer: MEDICARE

## 2022-10-26 PROCEDURE — 97140 MANUAL THERAPY 1/> REGIONS: CPT

## 2022-10-26 PROCEDURE — 97110 THERAPEUTIC EXERCISES: CPT

## 2022-10-26 NOTE — FLOWSHEET NOTE
[address] [address] [address]  Phone: [phone]   Fax:     [phone]      Physical Therapy Treatment Note/ Progress Report:       Date:  10/26/2022    Patient Name:  Jerrod Montelongo    :  1935  MRN: [id number]  Restrictions/Precautions:    Medical/Treatment Diagnosis Information:  Diagnosis: lumbar radiculopathy, back pain  Treatment Diagnosis: lumbar radiculopathy M54.16, back pain O91.3  Insurance/Certification information:  PT Insurance Information: A[employer]na Medicare  Physician Information:  Reji Chino MD  Plan of care signed (Y/N):     Date of Patient follow up with Physician: 2022     Progress Report: [x]  Yes  []  No     Date Range for reporting period:  Beginnin2022  Ending: 10/12/2022    Progress report due (10 Rx/or 30 days whichever is less):     Recertification due (POC duration/ or 90 days whichever is less): 2022     Visit # Insurance Allowable Auth Needed   19 A[employer]na Medicare []Yes    []No     Pain level:  2/10   Functional Scale: Foto FS Score  58, PHILIP 20.9%   Date Assessed: 10/12/2022    SUBJECTIVE:  Patient reports that she continues to feel good for about 3-4 days after coming to PT. Notes that she still hasn't been having the intense spasm on R side, but after the 3-4 days she has increased fatigue in her back muscles when standing. OBJECTIVE: Focused session on manual therapy and mobility to decrease pain and improve upright posture as patient with increased trunk flexion today with ambulation.    Observation: ambulating with SPC and decreased L knee flexion and stance time on L, increased trunk flexion  Test measurements:       Comments   Lumbar Flex 70     Lumbar Ext 25        ROM LEFT RIGHT Comments   Lumbar Side Bend 15 15 Stretch on R   Lumbar Rotation         Quadrant         Hip Flexion WNL WNL    Hip Abd         Hip ER Mild limitation Mild limitation Hip IR Mild limitation Mild limitation     Hip Extension         Knee Extension         Knee Flex         Hamstring Flex         Piriformis         Choco test                       Myotomes/Strength Normal Abnormal Comments   [x]ALL NORMAL         Hip Abd     L 3+/5, R 3+/5   Hip Ext     L 3+/5, R 4-/5   Hip flexion (L1-L2 femoral) []  []  L 3+/5, R 4-/5       RESTRICTIONS/PRECAUTIONS: none    Exercises/Interventions:     Therapeutic Ex (32918)   Min: 25 sets/sec reps notes   Hip Ext-POT 1 10 bilat   Prone glute max activation 10 10 bilat   SLR bilat 1 10 bilat   Standing hip flexion marches 2 10 bilat   Standing hip abduction 1 10 L , cues form   TA activation  + glute in hooklying 10 10    TA activation + hooklying march 1 10 bilat   LTR 1 10    Bridge Very small lift   Band pull down in modified tandem red   STS from EOT with TA activation 1 5 Improved discomfort in l/spine with TA activation   Lateral band pull      Lateral band walk      Bosu Lunge      Slide lunge       Ham string stretch      Hip Flex stretch      Glute Stretch      Seated forward ball roll 0  Blue  swiss ball   Seated no money 0  Red; seated EOB   SLS Ball/wall glute      Manual Intervention (15259) Min: 20      DN      Prone PA 1 7    GISTM/STM 1 6 R lumbar and thoracic paraspinals   bilat hip PROM/stretching 1 4'    L knee mobs 1 0    Hip belt mobs      Hip LA distraction            NMR re-education (74988)   Min:      Mf Activation- re-ed      TrA Re-ed activation      Glute Max re-ed activation      Prone martín Munozon            Therapeutic Activity (54884) Min:                                Therapeutic Exercise and NMR EXR  [x] (31875) Provided verbal/tactile cueing for activities related to strengthening, flexibility, endurance, ROM  for improvements in proximal hip and core control with self care, mobility, lifting and ambulation.   [x] (38097) Provided verbal/tactile cueing for activities related to improving balance, coordination, kinesthetic sense, posture, motor skill, proprioception  to assist with core control in self care, mobility, lifting, and ambulation. Therapeutic Activities:    [] (76697 or 86434) Provided verbal/tactile cueing for activities related to improving balance, coordination, kinesthetic sense, posture, motor skill, proprioception and motor activation to allow for proper function  with self care and ADLs  [] (98273) Provided training and instruction to the patient for proper core and proximal hip recruitment and positioning with ambulation re-education     Home Exercise Program:    [x] (89582) Reviewed/Progressed HEP activities related to strengthening, flexibility, endurance, ROM of core, proximal hip and LE for functional self-care, mobility, lifting and ambulation   [] (95585) Reviewed/Progressed HEP activities related to improving balance, coordination, kinesthetic sense, posture, motor skill, proprioception of core, proximal hip and LE for self care, mobility, lifting, and ambulation      Manual Treatments:  PROM / STM / Oscillations-Mobs:  G-I, II, III, IV (PA's, Inf., Post.)  [x] (20987) Provided manual therapy to mobilize proximal hip and LS spine soft tissue/joints for the purpose of modulating pain, promoting relaxation,  increasing ROM, reducing/eliminating soft tissue swelling/inflammation/restriction, improving soft tissue extensibility and allowing for proper ROM for normal function with self care, mobility, lifting and ambulation.      Modalities:       Charges:  Timed Code Treatment Minutes: 45   Total Treatment Minutes: 45     [] EVAL (LOW) 89319 (typically 20 minutes face-to-face)  [] EVAL (MOD) 20699 (typically 30 minutes face-to-face)  [] EVAL (HIGH) 70520 (typically 45 minutes face-to-face)  [] RE-EVAL     [x] QH(17929) x   2  [] DRY NEEDLE 1 OR 2 MUSCLES  [] NMR (28721) x     [] DRY NEEDLE 3+ MUSCLES  [x] Manual (05267) x   1    [] TA (41996) x     [] Mech Traction (93388)  [] ES(attended) (42933)     [] ES (un) (59438):   [] VASO (60750)  [] Other:        GOALS:  Patient stated goal: improve walking and ability to do housework  [] Progressing: [x] Met: [] Not Met: [] Adjusted  Therapist goals for Patient:   Short Term Goals: To be achieved in: 2 weeks  1. Independent in HEP and progression per patient tolerance, in order to prevent re-injury. [] Progressing: [x] Met: [] Not Met: [] Adjusted  2. Patient will have a decrease in pain to facilitate improvement in movement, function, and ADLs as indicated by Functional Deficits. [] Progressing: [x] Met: [] Not Met: [] Adjusted    Long Term Goals: To be achieved in: 6 weeks  1. Disability index score of 30% or less for the PHILIP or 55 or better on Foto to assist with reaching prior level of function. [] Progressing: [x] Met: [] Not Met: [] Adjusted  2. Patient will demonstrate increased AROM to WNL, good LS mobility, good hip ROM to allow for proper joint functioning as indicated by patients Functional Deficits. [x] Progressing: [] Met: [] Not Met: [] Adjusted  3. Patient will demonstrate an increase in Strength to good proximal hip and core activation to allow for proper functional mobility as indicated by patients Functional Deficits. [x] Progressing: [] Met: [] Not Met: [] Adjusted  4. Patient will return to standing and walking functional activities > 30 min without increased symptoms or restriction. [] Progressing: [x] Met: [] Not Met: [] Adjusted  5. Patient will report being able to perform housework with decreased symptoms or restriction, no greater than 2-3/10.(patient specific functional goal)    [] Progressing: [x] Met: [] Not Met: [] Adjusted     ASSESSMENT:  Patient with increased discomfort along R T12-L2 today. Good improvement in restriction with soft tissue mobilization. Continue to address decreasing soft tissue restriction in area and restoring joint mobility of segments. Focus on improving TA and glute activation. Increased education on TA activation with transition movements to decrease pain with patient reporting less pain with STS with utilizing TA activation. Treatment/Activity Tolerance:  [x] Patient tolerated treatment well [] Patient limited by fatique  [] Patient limited by pain  [] Patient limited by other medical complications  [] Other:     Overall Progression Towards Functional goals/ Treatment Progress Update:  [x] Patient is progressing as expected towards functional goals listed. [] Progression is slowed due to complexities/Impairments listed. [] Progression has been slowed due to co-morbidities. [] Plan just implemented, too soon to assess goals progression <30days   [] Goals require adjustment due to lack of progress  [] Patient is not progressing as expected and requires additional follow up with physician  [] Other:    Prognosis for POC: [x] Good [] Fair  [] Poor    Patient requires continued skilled intervention: [x] Yes  [] No        PLAN:  [x] Continue per plan of care [] Alter current plan (see comments)  [] Plan of care initiated [] Hold pending MD visit [] Discharge    Electronically signed by: Renee Barajas, PT    Note: If patient does not return for scheduled/recommended follow up visits, this note will serve as a discharge from care along with the most recent update on progress.

## 2022-10-28 ENCOUNTER — OFFICE VISIT (OUTPATIENT)
Dept: CARDIOLOGY CLINIC | Age: 87
End: 2022-10-28
Payer: MEDICARE

## 2022-10-28 VITALS
DIASTOLIC BLOOD PRESSURE: 84 MMHG | BODY MASS INDEX: 32.9 KG/M2 | OXYGEN SATURATION: 99 % | SYSTOLIC BLOOD PRESSURE: 136 MMHG | HEART RATE: 59 BPM | HEIGHT: 63 IN | WEIGHT: 185.7 LBS

## 2022-10-28 DIAGNOSIS — Z95.2 S/P AVR (AORTIC VALVE REPLACEMENT): ICD-10-CM

## 2022-10-28 DIAGNOSIS — I25.119 CORONARY ARTERY DISEASE INVOLVING NATIVE CORONARY ARTERY OF NATIVE HEART WITH ANGINA PECTORIS (HCC): Primary | ICD-10-CM

## 2022-10-28 DIAGNOSIS — I48.91 ATRIAL FIBRILLATION WITH RAPID VENTRICULAR RESPONSE (HCC): ICD-10-CM

## 2022-10-28 DIAGNOSIS — E78.5 HYPERLIPIDEMIA, UNSPECIFIED HYPERLIPIDEMIA TYPE: ICD-10-CM

## 2022-10-28 DIAGNOSIS — I10 ESSENTIAL HYPERTENSION: ICD-10-CM

## 2022-10-28 PROCEDURE — 1123F ACP DISCUSS/DSCN MKR DOCD: CPT | Performed by: INTERNAL MEDICINE

## 2022-10-28 PROCEDURE — 99214 OFFICE O/P EST MOD 30 MIN: CPT | Performed by: INTERNAL MEDICINE

## 2022-10-28 NOTE — PATIENT INSTRUCTIONS
Follow up with Dr Abdiel Bond in 6 months   Echo same day as next appointment   Start Aspirin 81mg daily - no other med changes   Call for any questions or concerns.

## 2022-11-02 ENCOUNTER — HOSPITAL ENCOUNTER (OUTPATIENT)
Dept: PHYSICAL THERAPY | Age: 87
Setting detail: THERAPIES SERIES
Discharge: HOME | End: 2022-11-02
Payer: MEDICARE

## 2022-11-02 PROCEDURE — 97110 THERAPEUTIC EXERCISES: CPT

## 2022-11-02 PROCEDURE — 97140 MANUAL THERAPY 1/> REGIONS: CPT

## 2022-11-02 NOTE — FLOWSHEET NOTE
[address]  Phone: [phone]   Fax:     [phone]      Physical Therapy Treatment Note/ Progress Report:       Date:  2022    Patient Name:  Daisy Mcburney    :  1935  MRN: [id number]  Restrictions/Precautions:    Medical/Treatment Diagnosis Information:  Diagnosis: lumbar radiculopathy, back pain  Treatment Diagnosis: lumbar radiculopathy M54.16, back pain B58.2  Insurance/Certification information:  PT Insurance Information: A[employer]na Medicare  Physician Information:  Jagdish Colmenares MD  Plan of care signed (Y/N):     Date of Patient follow up with Physician: 2022     Progress Report: [x]  Yes  []  No     Date Range for reporting period:  Beginnin2022  Ending: 10/12/2022    Progress report due (10 Rx/or 30 days whichever is less): 9401    Recertification due (POC duration/ or 90 days whichever is less): 2022     Visit # Insurance Allowable Auth Needed   20 A[employer] Medicare []Yes    []No     Pain level:  2/10   Functional Scale: Foto FS Score  58, PHILIP 20.9%   Date Assessed: 10/12/2022    SUBJECTIVE:  Patient reports that she continues to feel good for about 3-4 days after coming to PT. Notes that she still has sore spot just in center of back that still is bothersome. States that she still isn't feeling the intense spasm. May be leaving to go [place] for the next few months this weekend, if not will be staying for a few more weeks. OBJECTIVE: Focused session on manual therapy and mobility to decrease pain and improve upright posture as patient with increased trunk flexion today with ambulation.    Observation: ambulating with SPC and decreased L knee flexion and stance time on L, increased trunk flexion  Test measurements:       Comments   Lumbar Flex 70     Lumbar Ext 25        ROM LEFT RIGHT Comments   Lumbar Side Bend 15 15 Stretch on R   Lumbar Rotation Quadrant         Hip Flexion WNL WNL    Hip Abd         Hip ER Mild limitation Mild limitation     Hip IR Mild limitation Mild limitation     Hip Extension         Knee Extension         Knee Flex         Hamstring Flex         Piriformis         Choco test                       Myotomes/Strength Normal Abnormal Comments   [x]ALL NORMAL         Hip Abd     L 3+/5, R 3+/5   Hip Ext     L 3+/5, R 4-/5   Hip flexion (L1-L2 femoral) []  []  L 3+/5, R 4-/5       RESTRICTIONS/PRECAUTIONS: none    Exercises/Interventions:     Therapeutic Ex (22014)   Min: 25 sets/sec reps notes   Hip Ext-POT 1 10 bilat   Prone glute max activation 10 10 bilat   SLR bilat 1 10 bilat   Standing hip flexion marches 2 10 bilat   Standing hip abduction 1 10 L , cues form   TA activation  + glute in hooklying 10 10    TA activation + hooklying march 1 10 bilat   LTR 1 10    Bridge Very small lift   Band pull down in modified tandem red   STS from EOT with TA activation 1 5 Improved discomfort in l/spine with TA activation   Lateral band pull      Lateral band walk      Bosu Lunge      Slide lunge       Ham string stretch      Hip Flex stretch      Glute Stretch      Seated forward ball roll 0  Blue  swiss ball   Seated no money 0  Red; seated EOB   SLS Ball/wall glute      Manual Intervention (04372) Min: 20      DN      Prone PA 1 7    GISTM/STM 1 6 R lumbar and thoracic paraspinals   bilat hip PROM/stretching 1 4'    L knee mobs 1 0    Hip belt mobs      Hip LA distraction            NMR re-education (93044)   Min:      Mf Activation- re-ed      TrA Re-ed activation      Glute Max re-ed activation      Prone martín Baca            Therapeutic Activity (16663) Min:                                Therapeutic Exercise and NMR EXR  [x] (82376) Provided verbal/tactile cueing for activities related to strengthening, flexibility, endurance, ROM  for improvements in proximal hip and core control with self care, mobility, lifting and ambulation. [x] (32104) Provided verbal/tactile cueing for activities related to improving balance, coordination, kinesthetic sense, posture, motor skill, proprioception  to assist with core control in self care, mobility, lifting, and ambulation. Therapeutic Activities:    [] (54642 or 89228) Provided verbal/tactile cueing for activities related to improving balance, coordination, kinesthetic sense, posture, motor skill, proprioception and motor activation to allow for proper function  with self care and ADLs  [] (83968) Provided training and instruction to the patient for proper core and proximal hip recruitment and positioning with ambulation re-education     Home Exercise Program:    [x] (82659) Reviewed/Progressed HEP activities related to strengthening, flexibility, endurance, ROM of core, proximal hip and LE for functional self-care, mobility, lifting and ambulation   [] (94252) Reviewed/Progressed HEP activities related to improving balance, coordination, kinesthetic sense, posture, motor skill, proprioception of core, proximal hip and LE for self care, mobility, lifting, and ambulation      Manual Treatments:  PROM / STM / Oscillations-Mobs:  G-I, II, III, IV (PA's, Inf., Post.)  [x] (12768) Provided manual therapy to mobilize proximal hip and LS spine soft tissue/joints for the purpose of modulating pain, promoting relaxation,  increasing ROM, reducing/eliminating soft tissue swelling/inflammation/restriction, improving soft tissue extensibility and allowing for proper ROM for normal function with self care, mobility, lifting and ambulation.      Modalities:       Charges:  Timed Code Treatment Minutes: 45   Total Treatment Minutes: 45     [] EVAL (LOW) 94690 (typically 20 minutes face-to-face)  [] EVAL (MOD) 70427 (typically 30 minutes face-to-face)  [] EVAL (HIGH) 01983 (typically 45 minutes face-to-face)  [] RE-EVAL     [x] TW(27474) x   2  [] DRY NEEDLE 1 OR 2 MUSCLES  [] NMR (87319) x     [] DRY NEEDLE 3+ MUSCLES  [x] Manual (13486) x   1    [] TA (47575) x     [] Mech Traction (54486)  [] ES(attended) (25554)     [] ES (un) (83463):   [] VASO (71762)  [] Other:        GOALS:  Patient stated goal: improve walking and ability to do housework  [] Progressing: [x] Met: [] Not Met: [] Adjusted  Therapist goals for Patient:   Short Term Goals: To be achieved in: 2 weeks  1. Independent in HEP and progression per patient tolerance, in order to prevent re-injury. [] Progressing: [x] Met: [] Not Met: [] Adjusted  2. Patient will have a decrease in pain to facilitate improvement in movement, function, and ADLs as indicated by Functional Deficits. [] Progressing: [x] Met: [] Not Met: [] Adjusted    Long Term Goals: To be achieved in: 6 weeks  1. Disability index score of 30% or less for the PHILIP or 55 or better on Foto to assist with reaching prior level of function. [] Progressing: [x] Met: [] Not Met: [] Adjusted  2. Patient will demonstrate increased AROM to WNL, good LS mobility, good hip ROM to allow for proper joint functioning as indicated by patients Functional Deficits. [x] Progressing: [] Met: [] Not Met: [] Adjusted  3. Patient will demonstrate an increase in Strength to good proximal hip and core activation to allow for proper functional mobility as indicated by patients Functional Deficits. [x] Progressing: [] Met: [] Not Met: [] Adjusted  4. Patient will return to standing and walking functional activities > 30 min without increased symptoms or restriction. [] Progressing: [x] Met: [] Not Met: [] Adjusted  5. Patient will report being able to perform housework with decreased symptoms or restriction, no greater than 2-3/10.(patient specific functional goal)    [] Progressing: [x] Met: [] Not Met: [] Adjusted     ASSESSMENT:  Patient with increased discomfort along R T12-L2 today. Good improvement in restriction with soft tissue mobilization.  Continue to address decreasing soft tissue restriction in area and restoring joint mobility of segments. Continued focus on improving TA and glute activation. Treatment/Activity Tolerance:  [x] Patient tolerated treatment well [] Patient limited by fatique  [] Patient limited by pain  [] Patient limited by other medical complications  [] Other:     Overall Progression Towards Functional goals/ Treatment Progress Update:  [x] Patient is progressing as expected towards functional goals listed. [] Progression is slowed due to complexities/Impairments listed. [] Progression has been slowed due to co-morbidities. [] Plan just implemented, too soon to assess goals progression <30days   [] Goals require adjustment due to lack of progress  [] Patient is not progressing as expected and requires additional follow up with physician  [] Other:    Prognosis for POC: [x] Good [] Fair  [] Poor    Patient requires continued skilled intervention: [x] Yes  [] No        PLAN:  [x] Continue per plan of care [] Alter current plan (see comments)  [] Plan of care initiated [] Hold pending MD visit [] Discharge    Electronically signed by: Enrico Schwab, PT    Note: If patient does not return for scheduled/recommended follow up visits, this note will serve as a discharge from care along with the most recent update on progress.

## 2022-11-09 ENCOUNTER — HOSPITAL ENCOUNTER (OUTPATIENT)
Dept: PHYSICAL THERAPY | Age: 87
Setting detail: THERAPIES SERIES
Discharge: HOME | End: 2022-11-09
Payer: MEDICARE

## 2022-11-09 NOTE — FLOWSHEET NOTE
[facility]    Physical Therapy  Cancellation/No-show Note  Patient Name:  Nelson Hutchison  :  1935   Date:  2022  Cancelled visits to date: 0  No-shows to date: 1    For today's appointment patient:  []  Cancelled  []  Rescheduled appointment  [x]  No-show     Reason given by patient:  []  Patient ill  []  Conflicting appointment  []  No transportation    []  Conflict with work  []  No reason given  []  Other:     Comments:      Electronically signed by:  Barrington Jean-Baptiste, PT, DPT

## 2022-11-14 ENCOUNTER — NURSE ONLY (OUTPATIENT)
Dept: CARDIOLOGY CLINIC | Age: 87
End: 2022-11-14
Payer: MEDICARE

## 2022-11-14 DIAGNOSIS — Z45.09 ENCOUNTER FOR ELECTRONIC ANALYSIS OF REVEAL EVENT RECORDER: ICD-10-CM

## 2022-11-14 DIAGNOSIS — I63.9 CEREBRAL INFARCTION, UNSPECIFIED MECHANISM (HCC): Primary | ICD-10-CM

## 2022-11-14 PROCEDURE — G2066 INTER DEVC REMOTE 30D: HCPCS | Performed by: INTERNAL MEDICINE

## 2022-11-14 PROCEDURE — 93298 REM INTERROG DEV EVAL SCRMS: CPT | Performed by: INTERNAL MEDICINE

## 2022-11-14 NOTE — PROGRESS NOTES
We received a remote transmission from patient's monitor at home. Remote Linq report shows AF w RVR. Pt showed no symptoms. EP physician to review. We will continue to monitor remotely. Implanted for stroke. Pt is on Xarelto. End of 31-day monitoring period 11-14-22.

## 2022-12-19 ENCOUNTER — NURSE ONLY (OUTPATIENT)
Dept: CARDIOLOGY CLINIC | Age: 87
End: 2022-12-19
Payer: MEDICARE

## 2022-12-19 DIAGNOSIS — Z45.09 ENCOUNTER FOR ELECTRONIC ANALYSIS OF REVEAL EVENT RECORDER: ICD-10-CM

## 2022-12-19 DIAGNOSIS — I63.9 CEREBROVASCULAR ACCIDENT (CVA), UNSPECIFIED MECHANISM (HCC): Primary | ICD-10-CM

## 2022-12-19 PROCEDURE — G2066 INTER DEVC REMOTE 30D: HCPCS | Performed by: INTERNAL MEDICINE

## 2022-12-19 PROCEDURE — 93298 REM INTERROG DEV EVAL SCRMS: CPT | Performed by: INTERNAL MEDICINE

## 2022-12-19 NOTE — PROGRESS NOTES
We received a remote transmission from patient's monitor at home. Remote Linq report shows AF w RVR. Pt showed no symptoms. EP physician to review. We will continue to monitor remotely. Implanted for stroke. Pt is on Xarelto. End of 31-day monitoring period 12-19-22.

## 2022-12-20 RX ORDER — METOPROLOL SUCCINATE 25 MG/1
50 TABLET, EXTENDED RELEASE ORAL DAILY
Qty: 30 TABLET | Refills: 3 | Status: SHIPPED | OUTPATIENT
Start: 2022-12-20

## 2022-12-20 NOTE — PROGRESS NOTES
Spoke to patient with a fib noted. Long h/o paroxysmal a fib with poor candidate for anticoagulation.  Will start toprol xl 50 mg to help with rate control

## 2023-01-03 ENCOUNTER — TELEPHONE (OUTPATIENT)
Dept: CARDIOLOGY CLINIC | Age: 88
End: 2023-01-03

## 2023-01-23 ENCOUNTER — NURSE ONLY (OUTPATIENT)
Dept: CARDIOLOGY CLINIC | Age: 88
End: 2023-01-23
Payer: MEDICARE

## 2023-01-23 DIAGNOSIS — I48.91 ATRIAL FIBRILLATION WITH RAPID VENTRICULAR RESPONSE (HCC): Primary | ICD-10-CM

## 2023-01-23 DIAGNOSIS — Z45.09 ENCOUNTER FOR ELECTRONIC ANALYSIS OF REVEAL EVENT RECORDER: ICD-10-CM

## 2023-01-23 PROCEDURE — 93298 REM INTERROG DEV EVAL SCRMS: CPT | Performed by: INTERNAL MEDICINE

## 2023-01-23 PROCEDURE — G2066 INTER DEVC REMOTE 30D: HCPCS | Performed by: INTERNAL MEDICINE

## 2023-01-23 NOTE — PROGRESS NOTES
We received a remote transmission from patient's monitor at home. Remote Linq report shows AF. Pt is on Xarelto. EP physician to review. We will continue to monitor remotely. Implanted for stroke. Pt is on Xarelto. End of 31-day monitoring period 1-23-23.

## 2023-02-27 ENCOUNTER — NURSE ONLY (OUTPATIENT)
Dept: CARDIOLOGY CLINIC | Age: 88
End: 2023-02-27
Payer: MEDICARE

## 2023-02-27 DIAGNOSIS — I63.9 CEREBRAL INFARCTION, UNSPECIFIED MECHANISM (HCC): Primary | ICD-10-CM

## 2023-02-27 DIAGNOSIS — Z45.09 ENCOUNTER FOR ELECTRONIC ANALYSIS OF REVEAL EVENT RECORDER: ICD-10-CM

## 2023-02-27 PROCEDURE — G2066 INTER DEVC REMOTE 30D: HCPCS | Performed by: INTERNAL MEDICINE

## 2023-02-27 PROCEDURE — 93298 REM INTERROG DEV EVAL SCRMS: CPT | Performed by: INTERNAL MEDICINE

## 2023-02-27 NOTE — PROGRESS NOTES
We received a remote transmission from patient's monitor at home. Remote Linq report shows AF. Pt is on Xarelto. Pt has reached the ALEX. Office staff notified. EP physician to review. We will continue to monitor remotely. Implanted for stroke. Pt is on Xarelto. End of 31-day monitoring period 2-27-23.

## 2023-02-28 ENCOUNTER — TELEPHONE (OUTPATIENT)
Dept: CARDIOLOGY CLINIC | Age: 88
End: 2023-02-28

## 2023-02-28 NOTE — TELEPHONE ENCOUNTER
Please call and schedule routine follow up with Dr. Juan Ramon Ferguson - next available - loop recorder battery is depleted.

## 2023-04-21 ENCOUNTER — OFFICE VISIT (OUTPATIENT)
Dept: CARDIOLOGY CLINIC | Age: 88
End: 2023-04-21

## 2023-04-21 ENCOUNTER — TELEPHONE (OUTPATIENT)
Dept: CARDIOLOGY CLINIC | Age: 88
End: 2023-04-21

## 2023-04-21 ENCOUNTER — PROCEDURE VISIT (OUTPATIENT)
Dept: CARDIOLOGY CLINIC | Age: 88
End: 2023-04-21

## 2023-04-21 VITALS
WEIGHT: 173 LBS | SYSTOLIC BLOOD PRESSURE: 132 MMHG | DIASTOLIC BLOOD PRESSURE: 80 MMHG | OXYGEN SATURATION: 98 % | HEART RATE: 72 BPM | BODY MASS INDEX: 30.65 KG/M2 | HEIGHT: 63 IN

## 2023-04-21 DIAGNOSIS — I10 ESSENTIAL HYPERTENSION: ICD-10-CM

## 2023-04-21 DIAGNOSIS — Z95.2 S/P AVR (AORTIC VALVE REPLACEMENT): ICD-10-CM

## 2023-04-21 DIAGNOSIS — I25.119 CORONARY ARTERY DISEASE INVOLVING NATIVE CORONARY ARTERY OF NATIVE HEART WITH ANGINA PECTORIS (HCC): ICD-10-CM

## 2023-04-21 DIAGNOSIS — E78.5 HYPERLIPIDEMIA, UNSPECIFIED HYPERLIPIDEMIA TYPE: ICD-10-CM

## 2023-04-21 DIAGNOSIS — I48.0 PAF (PAROXYSMAL ATRIAL FIBRILLATION) (HCC): Primary | ICD-10-CM

## 2023-04-21 LAB
LV EF: 60 %
LVEF MODALITY: NORMAL

## 2023-04-21 RX ORDER — ASPIRIN 81 MG/1
81 TABLET ORAL AS NEEDED
COMMUNITY
Start: 2016-02-11 | End: 2023-04-21

## 2023-04-21 NOTE — PATIENT INSTRUCTIONS
Start eliquis 2.5 mg twice daily (assess for symptoms of bleeding)  Stop 81 mg Aspirin   Cardioversion with Dr Melodie Charles after being on Eliquis x 30 days (early June 2023)  Could Consider Watchman in future  Ok to cancel apt with Dr Eulalia Kelley in May  Continue risk factor modifications. Call for any change in symptoms, call to report any changes in shortness of breath or development of chest pain with activity.

## 2023-04-25 NOTE — TELEPHONE ENCOUNTER
EKG, BP, O2 monitors applied as per protocol.  Pt's daughter came into [place] office to ask if we could fax over her moms records to [place] [facility] Dr. Diane Moreira His fax #[phone].

## 2023-05-31 ENCOUNTER — TELEPHONE (OUTPATIENT)
Dept: CARDIOLOGY CLINIC | Age: 88
End: 2023-05-31

## 2023-05-31 NOTE — TELEPHONE ENCOUNTER
Pt called with questions for LES about the cardioversion on 06/07 and she has question about her Loop. Please advise.   Thank you

## 2023-07-05 ENCOUNTER — HOSPITAL ENCOUNTER (OUTPATIENT)
Dept: CARDIAC CATH/INVASIVE PROCEDURES | Age: 88
Discharge: HOME | End: 2023-07-05
Attending: INTERNAL MEDICINE | Admitting: INTERNAL MEDICINE
Payer: MEDICARE

## 2023-07-05 VITALS
HEART RATE: 88 BPM | DIASTOLIC BLOOD PRESSURE: 80 MMHG | SYSTOLIC BLOOD PRESSURE: 175 MMHG | OXYGEN SATURATION: 98 % | BODY MASS INDEX: 28.53 KG/M2 | WEIGHT: 161 LBS | HEIGHT: 63 IN | TEMPERATURE: 98 F

## 2023-07-05 LAB
EKG ATRIAL RATE: 141 BPM
EKG ATRIAL RATE: 69 BPM
EKG DIAGNOSIS: NORMAL
EKG DIAGNOSIS: NORMAL
EKG P AXIS: 75 DEGREES
EKG P-R INTERVAL: 188 MS
EKG Q-T INTERVAL: 388 MS
EKG Q-T INTERVAL: 432 MS
EKG QRS DURATION: 86 MS
EKG QRS DURATION: 90 MS
EKG QTC CALCULATION (BAZETT): 462 MS
EKG QTC CALCULATION (BAZETT): 497 MS
EKG R AXIS: -20 DEGREES
EKG R AXIS: -30 DEGREES
EKG T AXIS: 45 DEGREES
EKG T AXIS: 50 DEGREES
EKG VENTRICULAR RATE: 69 BPM
EKG VENTRICULAR RATE: 99 BPM

## 2023-07-05 PROCEDURE — 93005 ELECTROCARDIOGRAM TRACING: CPT | Performed by: INTERNAL MEDICINE

## 2023-07-05 PROCEDURE — 93010 ELECTROCARDIOGRAM REPORT: CPT | Performed by: INTERNAL MEDICINE

## 2023-07-05 PROCEDURE — 92960 CARDIOVERSION ELECTRIC EXT: CPT

## 2023-07-05 PROCEDURE — 7100000010 HC PHASE II RECOVERY - FIRST 15 MIN

## 2023-07-05 RX ORDER — SODIUM CHLORIDE 0.9 % (FLUSH) 0.9 %
5-40 SYRINGE (ML) INJECTION PRN
Status: DISCONTINUED | OUTPATIENT
Start: 2023-07-05 | End: 2023-07-05 | Stop reason: HOSPADM

## 2023-07-05 NOTE — PROCEDURES
[facility]                     [address], [address]                            CARDIAC CATHETERIZATION    PATIENT NAME: Ashok Pham                   :        1935  MED REC NO:   [id number]                          ROOM:  ACCOUNT NO:   [de-identified]                           ADMIT DATE: 2023  PROVIDER:     Mei Frias MD    DATE OF PROCEDURE:  2023    PROCEDURE PERFORMED:  Elective cardioversion. INDICATION:  The patient with persistent atrial fibrillation, on Eliquis  therapy. PROCEDURE IN DETAIL:  The patient was given 50 mg of intravenous  Brevital for full conscious sedation. This was administered under my  direct supervision. Full conscious sedation was achieved from  approximately 08:45 a.m. through 09:00 a.m. The patient was then  cardioverted with 200 Joules to a normal sinus rhythm without any  apparent complications.         Mei Frias MD    D: 2023 8:56:50       T: 2023 11:59:19     [email or web address]  Job#: [id number]     Doc#: [id number]    CC:

## 2023-07-05 NOTE — DISCHARGE INSTRUCTIONS
CARDIOVERSION DISCHARGE INSTRUCTIONS    No driving for 24 hours. We strongly recommend that a responsible adult stay with you for the next 24 hours. Continue Eliquis    Hydrocortisone 1% cream to reddened areas as needed. Right arm;

## 2023-07-05 NOTE — H&P
not consent to watchman  Continue risk factor modifications. Call for any change in symptoms, call to report any changes in shortness of breath or development of chest pain with activity. Cardioversion today        I appreciate the opportunity of cooperating in the care of this individual.    Anais Larry. Owen Betancourt M.D., [facility]      Patient's problem list, medications, allergies, past medical, surgical, social and family histories were reviewed and updated as appropriate.

## 2023-07-05 NOTE — PRE SEDATION
Brief Pre-Op Note/Sedation Assessment      Dewey Jain  1935  [id number]  7:56 AM    Planned Procedure: cardioversion    Post Procedure Plan: Return to same level of care  Consent: I have discussed with the patient and/or the patient representative the indication, alternatives, and the possible risks and/or complications of the planned procedure and the anesthesia methods. The patient and/or patient representative appear to understand and agree to proceed. Chief Complaint:   Dyspnea on Exertion      Indications for Cath Procedure:  Presentation:  Cardiac Arrythmia  2. Anginal Classification within 2 weeks:  No symptoms  3. Angina Symptoms Assessment:  Asymptomatic  4. Heart Failure Class within last 2 weeks:  No symptoms  5. Cardiovascular Instability:  No    Prior Ischemic Workup/Eval:  Pre-Procedural Medications: Yes: Beta Blockers  2. Stress Test Completed? No    Does Patient need surgery? Cath Valve Surgery:  No    Pre-Procedure Medical History:  Vital Signs: There were no vitals taken for this visit. Allergies: Allergies   Allergen Reactions    Lipitor Hives    Penicillins      Medications:    Current Facility-Administered Medications   Medication Dose Route Frequency Provider Last Rate Last Admin    sodium chloride flush 0.9 % injection 5-40 mL  5-40 mL IntraVENous PRN Nati Chaidez MD           Past Medical History:    Past Medical History:   Diagnosis Date    Aortic valve disease     CAD (coronary artery disease)     CVA (cerebral infarction) 10/2015    Hypertension        Surgical History:    Past Surgical History:   Procedure Laterality Date    AORTIC VALVE REPLACEMENT  12/4/2015    AVR porcine by   [address] [facility] [address] [facility] [address] CATHETERIZATION  11/19/2015    Dr. Shanda Mclaughlin - no flow restrictive stenoses in coronary arteries    CATARACT REMOVAL WITH IMPLANT Left 10/9/2015    COLONOSCOPY N/A 5/8/2020    COLONOSCOPY POLYPECTOMY SNARE/COLD BIOPSY performed by Jackeline Mix MD at [address] [place] [address]

## 2023-07-07 ENCOUNTER — TELEPHONE (OUTPATIENT)
Dept: CARDIOLOGY CLINIC | Age: 88
End: 2023-07-07

## 2023-07-07 RX ORDER — LISINOPRIL 10 MG/1
10 TABLET ORAL 2 TIMES DAILY
Qty: 30 TABLET | Refills: 0 | Status: SHIPPED | OUTPATIENT
Start: 2023-07-07 | End: 2023-07-24

## 2023-07-07 NOTE — TELEPHONE ENCOUNTER
Spoke to her and will start lisinopril 10 mg bid.  If no better with inability to walk advised her to go to ER

## 2023-07-07 NOTE — TELEPHONE ENCOUNTER
Pt called in stating that she had a Cardioversion on Wed July 5, and she started having issues Wed evening with things getting worse on Thursday       Dizziness, Sob, nausea,  Np is running high 182/100 Pulse 60. pt states she isn't stable on her feet she has to use a walker or hold onto something. Pt states she just feels terrible and would like to know what she should do. Pt states during the night she felt so bad she almost called the squad to come take her to the ed.     Pt states she will be waiting on a call from [facility] and she cn be reached at [phone]

## 2023-07-24 RX ORDER — LISINOPRIL 10 MG/1
TABLET ORAL
Qty: 30 TABLET | Refills: 0 | Status: SHIPPED | OUTPATIENT
Start: 2023-07-24

## 2023-07-24 NOTE — TELEPHONE ENCOUNTER
[facility]ril from  [facility].     Last ov: 04/21/20232 LES    Last Refill: 07/07/2023 #30 w/ 0 refills    Next appointment: 08/24/2023 LES

## 2023-07-31 ENCOUNTER — TELEPHONE (OUTPATIENT)
Dept: CARDIOLOGY CLINIC | Age: 88
End: 2023-07-31

## 2023-07-31 NOTE — TELEPHONE ENCOUNTER
Spoke to Juan Carlos. She has been feeling intermittently dizzy x couple of weeks. No other symptoms, besides a runny nose. Pt declines apt for tomorrow and would prefer apt on Thursday due to transportation needs.   Pt scheduled 8/3/23 at 2:45pm.

## 2023-07-31 NOTE — TELEPHONE ENCOUNTER
Pt called stating that pt stating they are scheduled with LES 8/24 and  pt stated they are dizzy and feel off balance and is wonder could it be from the blood thinners or something else going on should they wait that long to see LES?     Pls advise thank you

## 2023-08-03 ENCOUNTER — OFFICE VISIT (OUTPATIENT)
Dept: CARDIOLOGY CLINIC | Age: 88
End: 2023-08-03
Payer: MEDICARE

## 2023-08-03 ENCOUNTER — APPOINTMENT (OUTPATIENT)
Dept: GENERAL RADIOLOGY | Age: 88
End: 2023-08-03
Payer: MEDICARE

## 2023-08-03 ENCOUNTER — APPOINTMENT (OUTPATIENT)
Dept: CT IMAGING | Age: 88
End: 2023-08-03
Payer: MEDICARE

## 2023-08-03 ENCOUNTER — HOSPITAL ENCOUNTER (EMERGENCY)
Age: 88
Discharge: OTHER INSTITUTION | End: 2023-08-04
Attending: EMERGENCY MEDICINE
Payer: MEDICARE

## 2023-08-03 VITALS
TEMPERATURE: 98.2 F | HEART RATE: 56 BPM | SYSTOLIC BLOOD PRESSURE: 191 MMHG | RESPIRATION RATE: 17 BRPM | HEIGHT: 63 IN | DIASTOLIC BLOOD PRESSURE: 64 MMHG | WEIGHT: 163 LBS | OXYGEN SATURATION: 96 % | BODY MASS INDEX: 28.88 KG/M2

## 2023-08-03 VITALS
HEIGHT: 63 IN | HEART RATE: 62 BPM | OXYGEN SATURATION: 98 % | WEIGHT: 163 LBS | DIASTOLIC BLOOD PRESSURE: 86 MMHG | BODY MASS INDEX: 28.88 KG/M2 | SYSTOLIC BLOOD PRESSURE: 135 MMHG

## 2023-08-03 DIAGNOSIS — R42 DIZZINESS: Primary | ICD-10-CM

## 2023-08-03 DIAGNOSIS — I25.119 CORONARY ARTERY DISEASE INVOLVING NATIVE CORONARY ARTERY OF NATIVE HEART WITH ANGINA PECTORIS (HCC): ICD-10-CM

## 2023-08-03 DIAGNOSIS — E78.5 HYPERLIPIDEMIA, UNSPECIFIED HYPERLIPIDEMIA TYPE: ICD-10-CM

## 2023-08-03 DIAGNOSIS — I48.0 PAF (PAROXYSMAL ATRIAL FIBRILLATION) (HCC): Primary | ICD-10-CM

## 2023-08-03 DIAGNOSIS — I10 ESSENTIAL HYPERTENSION: ICD-10-CM

## 2023-08-03 DIAGNOSIS — I65.09 VERTEBRAL ARTERY THROMBOSIS, UNSPECIFIED LATERALITY: ICD-10-CM

## 2023-08-03 DIAGNOSIS — Z95.2 S/P AVR (AORTIC VALVE REPLACEMENT): ICD-10-CM

## 2023-08-03 LAB
ALBUMIN SERPL-MCNC: 4 G/DL (ref 3.4–5)
ALBUMIN/GLOB SERPL: 1.7 {RATIO} (ref 1.1–2.2)
ALP SERPL-CCNC: 60 U/L (ref 40–129)
ALT SERPL-CCNC: 16 U/L (ref 10–40)
ANION GAP SERPL CALCULATED.3IONS-SCNC: 4 MMOL/L (ref 3–16)
AST SERPL-CCNC: 21 U/L (ref 15–37)
BASOPHILS # BLD: 0 K/UL (ref 0–0.2)
BASOPHILS NFR BLD: 1 %
BILIRUB SERPL-MCNC: 0.5 MG/DL (ref 0–1)
BILIRUB UR QL STRIP.AUTO: NEGATIVE
BUN SERPL-MCNC: 16 MG/DL (ref 7–20)
CALCIUM SERPL-MCNC: 9.9 MG/DL (ref 8.3–10.6)
CHLORIDE SERPL-SCNC: 100 MMOL/L (ref 99–110)
CLARITY UR: CLEAR
CO2 SERPL-SCNC: 27 MMOL/L (ref 21–32)
COLOR UR: YELLOW
CREAT SERPL-MCNC: 0.6 MG/DL (ref 0.6–1.2)
DEPRECATED RDW RBC AUTO: 15.7 % (ref 12.4–15.4)
EOSINOPHIL # BLD: 0 K/UL (ref 0–0.6)
EOSINOPHIL NFR BLD: 0.9 %
GFR SERPLBLD CREATININE-BSD FMLA CKD-EPI: >60 ML/MIN/{1.73_M2}
GLUCOSE BLD-MCNC: 83 MG/DL (ref 70–99)
GLUCOSE SERPL-MCNC: 90 MG/DL (ref 70–99)
GLUCOSE UR STRIP.AUTO-MCNC: NEGATIVE MG/DL
HCT VFR BLD AUTO: 42.1 % (ref 36–48)
HGB BLD-MCNC: 13.7 G/DL (ref 12–16)
HGB UR QL STRIP.AUTO: NEGATIVE
KETONES UR STRIP.AUTO-MCNC: NEGATIVE MG/DL
LEUKOCYTE ESTERASE UR QL STRIP.AUTO: NEGATIVE
LYMPHOCYTES # BLD: 1 K/UL (ref 1–5.1)
LYMPHOCYTES NFR BLD: 28.8 %
MCH RBC QN AUTO: 29.9 PG (ref 26–34)
MCHC RBC AUTO-ENTMCNC: 32.6 G/DL (ref 31–36)
MCV RBC AUTO: 91.7 FL (ref 80–100)
MONOCYTES # BLD: 0.4 K/UL (ref 0–1.3)
MONOCYTES NFR BLD: 10.7 %
NEUTROPHILS # BLD: 2.1 K/UL (ref 1.7–7.7)
NEUTROPHILS NFR BLD: 58.6 %
NITRITE UR QL STRIP.AUTO: NEGATIVE
PERFORMED ON: NORMAL
PH UR STRIP.AUTO: 7.5 [PH] (ref 5–8)
PLATELET # BLD AUTO: 181 K/UL (ref 135–450)
PMV BLD AUTO: 10.7 FL (ref 5–10.5)
POTASSIUM SERPL-SCNC: 4.6 MMOL/L (ref 3.5–5.1)
PROT SERPL-MCNC: 6.4 G/DL (ref 6.4–8.2)
PROT UR STRIP.AUTO-MCNC: NEGATIVE MG/DL
RBC # BLD AUTO: 4.59 M/UL (ref 4–5.2)
REASON FOR REJECTION: NORMAL
REJECTED TEST: NORMAL
SODIUM SERPL-SCNC: 131 MMOL/L (ref 136–145)
SP GR UR STRIP.AUTO: <=1.005 (ref 1–1.03)
TROPONIN, HIGH SENSITIVITY: 25 NG/L (ref 0–14)
TROPONIN, HIGH SENSITIVITY: 29 NG/L (ref 0–14)
UA COMPLETE W REFLEX CULTURE PNL UR: NORMAL
UA DIPSTICK W REFLEX MICRO PNL UR: NORMAL
URN SPEC COLLECT METH UR: NORMAL
UROBILINOGEN UR STRIP-ACNC: 0.2 E.U./DL
WBC # BLD AUTO: 3.5 K/UL (ref 4–11)

## 2023-08-03 PROCEDURE — 6360000004 HC RX CONTRAST MEDICATION: Performed by: EMERGENCY MEDICINE

## 2023-08-03 PROCEDURE — 99285 EMERGENCY DEPT VISIT HI MDM: CPT

## 2023-08-03 PROCEDURE — 93000 ELECTROCARDIOGRAM COMPLETE: CPT | Performed by: INTERNAL MEDICINE

## 2023-08-03 PROCEDURE — 84484 ASSAY OF TROPONIN QUANT: CPT

## 2023-08-03 PROCEDURE — 36415 COLL VENOUS BLD VENIPUNCTURE: CPT

## 2023-08-03 PROCEDURE — 81003 URINALYSIS AUTO W/O SCOPE: CPT

## 2023-08-03 PROCEDURE — 70498 CT ANGIOGRAPHY NECK: CPT

## 2023-08-03 PROCEDURE — 96374 THER/PROPH/DIAG INJ IV PUSH: CPT

## 2023-08-03 PROCEDURE — 80053 COMPREHEN METABOLIC PANEL: CPT

## 2023-08-03 PROCEDURE — 71045 X-RAY EXAM CHEST 1 VIEW: CPT

## 2023-08-03 PROCEDURE — 93005 ELECTROCARDIOGRAM TRACING: CPT | Performed by: EMERGENCY MEDICINE

## 2023-08-03 PROCEDURE — 99214 OFFICE O/P EST MOD 30 MIN: CPT | Performed by: INTERNAL MEDICINE

## 2023-08-03 PROCEDURE — 70450 CT HEAD/BRAIN W/O DYE: CPT

## 2023-08-03 PROCEDURE — 6370000000 HC RX 637 (ALT 250 FOR IP): Performed by: EMERGENCY MEDICINE

## 2023-08-03 PROCEDURE — 1123F ACP DISCUSS/DSCN MKR DOCD: CPT | Performed by: INTERNAL MEDICINE

## 2023-08-03 PROCEDURE — 6360000002 HC RX W HCPCS: Performed by: EMERGENCY MEDICINE

## 2023-08-03 PROCEDURE — 85025 COMPLETE CBC W/AUTO DIFF WBC: CPT

## 2023-08-03 RX ORDER — LABETALOL HYDROCHLORIDE 5 MG/ML
10 INJECTION, SOLUTION INTRAVENOUS ONCE
Status: COMPLETED | OUTPATIENT
Start: 2023-08-03 | End: 2023-08-03

## 2023-08-03 RX ORDER — ASPIRIN 81 MG/1
324 TABLET, CHEWABLE ORAL ONCE
Status: COMPLETED | OUTPATIENT
Start: 2023-08-03 | End: 2023-08-03

## 2023-08-03 RX ORDER — CLOPIDOGREL BISULFATE 75 MG/1
300 TABLET ORAL ONCE
Status: COMPLETED | OUTPATIENT
Start: 2023-08-03 | End: 2023-08-03

## 2023-08-03 RX ADMIN — ASPIRIN 81 MG 324 MG: 81 TABLET ORAL at 20:15

## 2023-08-03 RX ADMIN — IOHEXOL 75 ML: 350 INJECTION, SOLUTION INTRAVENOUS at 17:33

## 2023-08-03 RX ADMIN — LABETALOL HYDROCHLORIDE 10 MG: 5 INJECTION INTRAVENOUS at 16:42

## 2023-08-03 RX ADMIN — CLOPIDOGREL BISULFATE 300 MG: 75 TABLET ORAL at 20:13

## 2023-08-03 ASSESSMENT — LIFESTYLE VARIABLES
HOW OFTEN DO YOU HAVE A DRINK CONTAINING ALCOHOL: NEVER
HOW MANY STANDARD DRINKS CONTAINING ALCOHOL DO YOU HAVE ON A TYPICAL DAY: PATIENT DOES NOT DRINK

## 2023-08-03 ASSESSMENT — PAIN - FUNCTIONAL ASSESSMENT: PAIN_FUNCTIONAL_ASSESSMENT: NONE - DENIES PAIN

## 2023-08-03 NOTE — PROGRESS NOTES
Bipap   twice daily > Cardioversion done 7/5/23  Normal sinus on today ECG      Hyperlipidemia  9/18/20  TG 82 HDL 79   12/29/22   TG 89  HDL 61   Previously took Crestor 5mg -unknown why stopped  She states she had hives from Lipitor. CHF   SOB improving  During admission 5/22/20 d/t symptomatic anemia  -continue lasix   -echo 7/28/22> EF 60%, moderate mitral regurgitation       Plan:    Cardiac test and lab results personally reviewed by me during this office visit and discussed. Continue risk factor modifications. Call for any change in symptoms, call to report any changes in shortness of breath or development of chest pain with activity. Will transport for an ER evaluation for an acute stroke with new visual symptoms. Symptoms very difficult to sort out but a marked change today. Had urged her to go to ER with prior conversations about symptoms    I appreciate the opportunity of cooperating in the care of this individual.    Kei Arroyo M.D., [facility]      Patient's problem list, medications, allergies, past medical, surgical, social and family histories were reviewed and updated as appropriate. Scribe's attestation: This note was scribed in the presence of Dr Zuhair Arroyo by Severo Catchings , RN. The scribe's documentation has been prepared under my direction and personally reviewed by me in its entirety. I confirm that the note above accurately reflects all work, treatment, procedures, and medical decision making performed by me.

## 2023-08-03 NOTE — ED NOTES
Pt able to stand with assist and use BSC, UA collected and sent.      Donnie Omalley RN  08/03/23 6761

## 2023-08-04 ENCOUNTER — APPOINTMENT (OUTPATIENT)
Dept: MRI IMAGING | Age: 88
DRG: 068 | End: 2023-08-04
Attending: INTERNAL MEDICINE
Payer: MEDICARE

## 2023-08-04 ENCOUNTER — HOSPITAL ENCOUNTER (INPATIENT)
Age: 88
LOS: 2 days | Discharge: HOME | DRG: 068 | End: 2023-08-06
Attending: INTERNAL MEDICINE | Admitting: INTERNAL MEDICINE
Payer: MEDICARE

## 2023-08-04 DIAGNOSIS — R42 DIZZINESS: Primary | ICD-10-CM

## 2023-08-04 PROBLEM — I67.9 CEREBROVASCULAR DISEASE: Status: ACTIVE | Noted: 2023-08-04

## 2023-08-04 PROBLEM — I35.0 AORTIC VALVE STENOSIS: Status: ACTIVE | Noted: 2023-08-04

## 2023-08-04 PROBLEM — Z95.2 S/P AVR: Status: ACTIVE | Noted: 2023-08-04

## 2023-08-04 LAB
EKG ATRIAL RATE: 58 BPM
EKG ATRIAL RATE: 59 BPM
EKG DIAGNOSIS: NORMAL
EKG DIAGNOSIS: NORMAL
EKG P AXIS: 17 DEGREES
EKG P AXIS: 58 DEGREES
EKG P-R INTERVAL: 150 MS
EKG P-R INTERVAL: 180 MS
EKG Q-T INTERVAL: 430 MS
EKG Q-T INTERVAL: 488 MS
EKG QRS DURATION: 84 MS
EKG QRS DURATION: 88 MS
EKG QTC CALCULATION (BAZETT): 422 MS
EKG QTC CALCULATION (BAZETT): 483 MS
EKG R AXIS: -30 DEGREES
EKG R AXIS: -33 DEGREES
EKG T AXIS: 41 DEGREES
EKG T AXIS: 51 DEGREES
EKG VENTRICULAR RATE: 58 BPM
EKG VENTRICULAR RATE: 59 BPM
LV EF: 63 %
LVEF MODALITY: NORMAL

## 2023-08-04 PROCEDURE — 99223 1ST HOSP IP/OBS HIGH 75: CPT | Performed by: INTERNAL MEDICINE

## 2023-08-04 PROCEDURE — 2060000000 HC ICU INTERMEDIATE R&B

## 2023-08-04 PROCEDURE — 97535 SELF CARE MNGMENT TRAINING: CPT

## 2023-08-04 PROCEDURE — 97116 GAIT TRAINING THERAPY: CPT

## 2023-08-04 PROCEDURE — 1200000000 HC SEMI PRIVATE

## 2023-08-04 PROCEDURE — 92610 EVALUATE SWALLOWING FUNCTION: CPT

## 2023-08-04 PROCEDURE — 93005 ELECTROCARDIOGRAM TRACING: CPT | Performed by: INTERNAL MEDICINE

## 2023-08-04 PROCEDURE — 6370000000 HC RX 637 (ALT 250 FOR IP): Performed by: INTERNAL MEDICINE

## 2023-08-04 PROCEDURE — 93010 ELECTROCARDIOGRAM REPORT: CPT | Performed by: INTERNAL MEDICINE

## 2023-08-04 PROCEDURE — 93306 TTE W/DOPPLER COMPLETE: CPT

## 2023-08-04 PROCEDURE — 97166 OT EVAL MOD COMPLEX 45 MIN: CPT

## 2023-08-04 PROCEDURE — 70551 MRI BRAIN STEM W/O DYE: CPT

## 2023-08-04 PROCEDURE — 97530 THERAPEUTIC ACTIVITIES: CPT

## 2023-08-04 PROCEDURE — 2580000003 HC RX 258: Performed by: INTERNAL MEDICINE

## 2023-08-04 PROCEDURE — 97162 PT EVAL MOD COMPLEX 30 MIN: CPT

## 2023-08-04 RX ORDER — FERROUS SULFATE 325(65) MG
325 TABLET ORAL
Status: DISCONTINUED | OUTPATIENT
Start: 2023-08-04 | End: 2023-08-06 | Stop reason: HOSPADM

## 2023-08-04 RX ORDER — SODIUM CHLORIDE 0.9 % (FLUSH) 0.9 %
5-40 SYRINGE (ML) INJECTION EVERY 12 HOURS SCHEDULED
Status: DISCONTINUED | OUTPATIENT
Start: 2023-08-04 | End: 2023-08-06 | Stop reason: HOSPADM

## 2023-08-04 RX ORDER — METOPROLOL SUCCINATE 50 MG/1
50 TABLET, EXTENDED RELEASE ORAL DAILY
Status: DISCONTINUED | OUTPATIENT
Start: 2023-08-04 | End: 2023-08-04

## 2023-08-04 RX ORDER — SODIUM CHLORIDE 0.9 % (FLUSH) 0.9 %
5-40 SYRINGE (ML) INJECTION PRN
Status: DISCONTINUED | OUTPATIENT
Start: 2023-08-04 | End: 2023-08-06 | Stop reason: HOSPADM

## 2023-08-04 RX ORDER — ASPIRIN 81 MG/1
81 TABLET, CHEWABLE ORAL DAILY
Status: DISCONTINUED | OUTPATIENT
Start: 2023-08-04 | End: 2023-08-06 | Stop reason: HOSPADM

## 2023-08-04 RX ORDER — POLYETHYLENE GLYCOL 3350 17 G/17G
17 POWDER, FOR SOLUTION ORAL DAILY PRN
Status: DISCONTINUED | OUTPATIENT
Start: 2023-08-04 | End: 2023-08-06 | Stop reason: HOSPADM

## 2023-08-04 RX ORDER — ONDANSETRON 4 MG/1
4 TABLET, ORALLY DISINTEGRATING ORAL EVERY 8 HOURS PRN
Status: DISCONTINUED | OUTPATIENT
Start: 2023-08-04 | End: 2023-08-06 | Stop reason: HOSPADM

## 2023-08-04 RX ORDER — ONDANSETRON 2 MG/ML
4 INJECTION INTRAMUSCULAR; INTRAVENOUS EVERY 6 HOURS PRN
Status: DISCONTINUED | OUTPATIENT
Start: 2023-08-04 | End: 2023-08-06 | Stop reason: HOSPADM

## 2023-08-04 RX ORDER — ASPIRIN 300 MG/1
300 SUPPOSITORY RECTAL DAILY
Status: DISCONTINUED | OUTPATIENT
Start: 2023-08-04 | End: 2023-08-06 | Stop reason: HOSPADM

## 2023-08-04 RX ORDER — LISINOPRIL 10 MG/1
10 TABLET ORAL 2 TIMES DAILY
Status: DISCONTINUED | OUTPATIENT
Start: 2023-08-04 | End: 2023-08-05

## 2023-08-04 RX ORDER — SODIUM CHLORIDE 9 MG/ML
INJECTION, SOLUTION INTRAVENOUS PRN
Status: DISCONTINUED | OUTPATIENT
Start: 2023-08-04 | End: 2023-08-06 | Stop reason: HOSPADM

## 2023-08-04 RX ADMIN — APIXABAN 2.5 MG: 2.5 TABLET, FILM COATED ORAL at 02:47

## 2023-08-04 RX ADMIN — ASPIRIN 81 MG: 81 TABLET, CHEWABLE ORAL at 09:39

## 2023-08-04 RX ADMIN — FERROUS SULFATE TAB 325 MG (65 MG ELEMENTAL FE) 325 MG: 325 (65 FE) TAB at 09:40

## 2023-08-04 RX ADMIN — SODIUM CHLORIDE, PRESERVATIVE FREE 10 ML: 5 INJECTION INTRAVENOUS at 09:00

## 2023-08-04 RX ADMIN — APIXABAN 2.5 MG: 2.5 TABLET, FILM COATED ORAL at 19:56

## 2023-08-04 RX ADMIN — LISINOPRIL 10 MG: 10 TABLET ORAL at 19:56

## 2023-08-04 RX ADMIN — LISINOPRIL 10 MG: 10 TABLET ORAL at 09:40

## 2023-08-04 RX ADMIN — APIXABAN 2.5 MG: 2.5 TABLET, FILM COATED ORAL at 09:40

## 2023-08-04 ASSESSMENT — ENCOUNTER SYMPTOMS
VOMITING: 0
CONSTIPATION: 0
SHORTNESS OF BREATH: 1
DIARRHEA: 0
PHOTOPHOBIA: 0
CHEST TIGHTNESS: 0
ABDOMINAL PAIN: 0
NAUSEA: 0
TROUBLE SWALLOWING: 0
COUGH: 1
VOICE CHANGE: 0

## 2023-08-04 NOTE — PLAN OF CARE
Problem: Safety - Adult  Goal: Free from fall injury  Outcome: Progressing  All standard safety precautions in place, call light within reach, bed wheel locked, lowest position, non slip socks on. No fall injury during this shift. Problem: Neurosensory - Adult  Goal: Achieves stable or improved neurological status  Outcome: Progressing  Routinely neuro checks assessment and documentation done. Problem: ABCDS Injury Assessment  Goal: Absence of physical injury  Outcome: Progressing  Pt free from physical injury.

## 2023-08-04 NOTE — CARE COORDINATION
Case Management Assessment  Initial Evaluation    Date/Time of Evaluation: 8/4/2023 2:58 PM  Assessment Completed by: LEONARD Ritter    If patient is discharged prior to next notation, then this note serves as note for discharge by case management. Patient Name: Tesha Sánchez                   YOB: 1935  Diagnosis: Dizziness [R42]                   Date / Time: 8/4/2023 12:17 AM    Patient Admission Status: Inpatient   Readmission Risk (Low < 19, Mod (19-27), High > 27): Readmission Risk Score: 12    Current PCP: Ventura Nixon, DO  PCP verified by CM? Yes    Chart Reviewed: Yes      History Provided by: Patient  Patient Orientation: Alert and Oriented    Patient Cognition: Alert    Hospitalization in the last 30 days (Readmission):  No    If yes, Readmission Assessment in CM Navigator will be completed. Advance Directives:      Code Status: Full Code   Patient's Primary Decision Maker is: Named in [address]      Discharge Planning:    Patient lives with: Alone (one daughter lives within walking distance) Type of Home: House  Primary Care Giver: Self  Patient Support Systems include: Children   Current Financial resources: Medicare  Current community resources: None  Current services prior to admission: Durable Medical Equipment            Current DME: Eva Bis, Wheelchair, Other (Comment) (rollator)            Type of Home Care services:  None    ADLS  Prior functional level: Independent in ADLs/IADLs  Current functional level: Independent in ADLs/IADLs    PT AM-PAC: 18 /24  OT AM-PAC: 20 /24    Family can provide assistance at DC: Yes  Would you like Case Management to discuss the discharge plan with any other family members/significant others, and if so, who?  No  Plans to Return to Present Housing: Yes  Other Identified Issues/Barriers to RETURNING to current housing: yes  Potential Assistance needed at discharge: [address] [place] [address] [address], Outpatient PT/OT            Potential

## 2023-08-04 NOTE — PLAN OF CARE
Problem: Discharge Planning  Goal: Discharge to home or other facility with appropriate resources  Outcome: Progressing  Flowsheets  Taken 8/4/2023 1809  Discharge to home or other facility with appropriate resources:   Identify barriers to discharge with patient and caregiver   Arrange for needed discharge resources and transportation as appropriate  Taken 8/4/2023 0710  Discharge to home or other facility with appropriate resources: Identify barriers to discharge with patient and caregiver  Note: Patient will have all needs met prior to discharge      Problem: Safety - Adult  Goal: Free from fall injury  Outcome: Progressing  Note: Patient will remain fall free during stay by implementing and following all safety precautions.

## 2023-08-04 NOTE — CONSULTS
[address] [facility],[address] Day: 1                                                         Code:Full Code  Admit Date: 8/4/2023  PCP: Dannie Cruz DO                                  Reason for Consultation/CC: Dizziness    HISTORY OF PRESENT ILLNESS:   Anuja Byrne is a 81 y/o female with a past medical history of prior CVA, HTN, CAD, s/p bioprosthetic AVR, and paroxysmal A-fib on eliquis who presented to the hospital for dizziness. Patient reports episodes of dizziness that worsen when she stands up and are alleviated when she lays down. She denies syncopal events but states that she commonly is concerned that she may pass out. 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 Cardiology consulted to evaluate possible cardiac etiology for patient's dizziness. PAST HISTORY:     Past Medical History:   Diagnosis Date    Aortic valve disease     CAD (coronary artery disease)     CVA (cerebral infarction) 10/2015    Hypertension        Past Surgical History:   Procedure Laterality Date    AORTIC VALVE REPLACEMENT  12/4/2015    AVR porcine by  [address] [facility] [address]  11/19/2015    Dr. Stevenson Confer - no flow restrictive stenoses in coronary arteries    CATARACT REMOVAL WITH IMPLANT Left 10/9/2015    COLONOSCOPY N/A 5/8/2020    COLONOSCOPY POLYPECTOMY SNARE/COLD BIOPSY performed by Wilber Richmond MD at [address] [facility]  12/22/2004    TOTAL KNEE ARTHROPLASTY Right 7/15/2013    UPPER GASTROINTESTINAL ENDOSCOPY N/A 5/8/2020    EGD BIOPSY performed by Wilber Richmond MD at [address]:   The patient lives at home.     Alcohol: No use  Illicit drugs: No use  Tobacco: Never used     Family History:  Family History   Problem
CBC / Coags Recent Labs     08/03/23  1554   WBC 3.5*   RBC 4.59   HGB 13.7   HCT 42.1         Other No results for input(s): LABA1C, LDLCALC, TRIG, TSH, [id number], FOLATE, LABSALI, COVID19 in the last 72 hours. No results for input(s): PHENYTOIN, KEPPRA, LACOSA, LAMO, VALPROATE, LACTSEPSIS, LACTA in the last 72 hours. CURRENT SCHEDULED MEDICATIONS   Inpatient Medications     apixaban, 2.5 mg, Oral, BID    ferrous sulfate, 325 mg, Oral, Daily with breakfast    lisinopril, 10 mg, Oral, BID    sodium chloride flush, 5-40 mL, IntraVENous, 2 times per day    aspirin, 81 mg, Oral, Daily **OR** aspirin, 300 mg, Rectal, Daily   Infusions    sodium chloride        Antibiotics   Recent Abx Admin        No antibiotic orders with administrations found. IMPRESSION & RECOMMENDATIONS     IMPRESSION:  Patient is an 81yo female with a PMHx of HTN, A-fib on Eliquis, CAD and s/p bioprosthetic AVR. Patient appears to have had near syncopal episode 2/2 likely decreased cerebral perfusion. Possible posterior cerebral stroke vs TIA. Endorsed some transient vision disturbances. Does not endorse any new muscle weakness, tingling or numbness in her face or any of her extremities. Physical exam was notable for lower extremity weakness on the left side, but she has that at baseline from her CVA in 2015. Has a history of aortic valve disease. CT head wo contrast and CTA head and neck found 50% stenosis of the proximal left ICA and an 8mm fusiform aneurysm at the right ICA at the level of the carotid siphon. Severe stenosis was noted in the supraclinoid ICA B/L. Also found intraluminal thrombus within the V4 segment of the right vertebral artery. However, there was no ICH or other acute abnormalities that were noted. Will workup to r/o stroke.     RECOMMENDATIONS:  - MRI brain wo contrast  - continue Eliquis   - continue aspirin  - lipid panel & A1c  - neurosurgery consulted for severe stenosis

## 2023-08-04 NOTE — ED PROVIDER NOTES
deficits throughout. Her NIH score was 0. DIAGNOSTIC RESULTS     EKG: All EKG's are interpreted by the Emergency Department Physician who either signs or Co-signsthis chart in the absence of a cardiologist.    Sinus rhythm at a rate of 60 beats a minute with no acute ST elevations or depressions or pathologic Q waves. RADIOLOGY:   Non-plain filmimages such as CT, Ultrasound and MRI are read by the radiologist. Plain radiographic images are visualized and preliminarily interpreted by the emergency physician with the below findings:    See below    Interpretation per the Radiologist below, if available at the time ofthis note: All incidental findings were discussed with the patient. CTA HEAD NECK W CONTRAST   Final Result   Small intraluminal thrombus within the intracranial V4 segment of the right   vertebral artery. 50% stenosis within the proximal left cervical internal carotid artery. 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 The aneurysm measures 8 mm in width. Severe stenosis involving the supraclinoid portion of both internal carotid   arteries. The findings were sent to the Radiology Results [employer] at 6:24   pm on 8/3/2023 to be communicated to a licensed caregiver. XR CHEST PORTABLE   Final Result   No acute cardiopulmonary disease. Moderate to large hiatal hernia. CT Head W/O Contrast   Final Result   Small intraluminal thrombus within the intracranial V4 segment of the right   vertebral artery. 50% stenosis within the proximal left cervical internal carotid artery. 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 The aneurysm measures 8 mm in width. Severe stenosis involving the supraclinoid portion of both internal carotid   arteries.       The findings were sent to the Radiology [employer] at signed)  Attending Emergency Physician         Portia Horta MD  08/03/23 4720

## 2023-08-04 NOTE — H&P
involving the right internal carotid artery at the level of the carotid siphon. The aneurysm measures 8 mm in width. Severe stenosis involving the supraclinoid portion of both internal carotid arteries. The findings were sent to the Radiology Results [address] at 6:24 pm on 8/3/2023 to be communicated to a licensed caregiver.          Electronically signed by Margi Flores MD on 8/4/2023 at 1:04 AM

## 2023-08-04 NOTE — CONSULTS
Telemedicine Consult Note  [facility] Stroke Team                Patient Name: Edelmira Steen (77 y.o. female)  MRN: [id number]  : 1935  Admission Date: 8/3/2023   Current Date: 23    BRIEF NOTE  This is a 80 y.o., woman with a past medical history of prior stroke in 15, HTN, CAD, AVR (On eliquis) who is presenting with dizziness and confusion. Patient was sent from Cardiology office where she had presented with days to weeks of dizziness and blurry vision, today she had a presyncopal episode with almost fainting sensation and darkening of her vision for a second. Per ED team, NIHSS of 0 although continues to have some mental fog in their exam. CTA head and neck showing : intraluminal thrombus within V4 segment of the right  vertebral arter, associated to severe calcified plaque. Also, 50% stenosis of the proximal left cervical internal carotid artery and severe stenosis of bilateral supraclinoid ICAs. 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 Recommended intially DAPT load, however after confirming adherence to eliquis, would only continue antiplatelet monotherapy for this plaque as triple therapy (AC + DAPT) would increase risk of intracranial hemorrhage in the long term.       Recommendations/Plan   No TNK/tPA, outside time window  No EVT, as no LVO  DAPT load given, continue with antiplatelet monotherapy as she continues her [facility] with eliquis for AVR  MRI Brain wo  Consult Neurolog    Patient enrolled in clinical trials: No       Electronically signed by Cynthia Henderson MD on 8/3/23 at 8:56 PM EDT    Stroke Team        Telemedicine Time: 0 minutes

## 2023-08-05 LAB
CHOLEST SERPL-MCNC: 240 MG/DL (ref 0–199)
HDLC SERPL-MCNC: 67 MG/DL (ref 40–60)
LDLC SERPL CALC-MCNC: 159 MG/DL
TRIGL SERPL-MCNC: 70 MG/DL (ref 0–150)
VLDLC SERPL CALC-MCNC: 14 MG/DL

## 2023-08-05 PROCEDURE — 6370000000 HC RX 637 (ALT 250 FOR IP): Performed by: INTERNAL MEDICINE

## 2023-08-05 PROCEDURE — 99232 SBSQ HOSP IP/OBS MODERATE 35: CPT | Performed by: NURSE PRACTITIONER

## 2023-08-05 PROCEDURE — 83036 HEMOGLOBIN GLYCOSYLATED A1C: CPT

## 2023-08-05 PROCEDURE — 80061 LIPID PANEL: CPT

## 2023-08-05 PROCEDURE — 36415 COLL VENOUS BLD VENIPUNCTURE: CPT

## 2023-08-05 PROCEDURE — 2580000003 HC RX 258: Performed by: INTERNAL MEDICINE

## 2023-08-05 PROCEDURE — 99232 SBSQ HOSP IP/OBS MODERATE 35: CPT

## 2023-08-05 PROCEDURE — 2060000000 HC ICU INTERMEDIATE R&B

## 2023-08-05 RX ORDER — HYDRALAZINE HYDROCHLORIDE 20 MG/ML
10 INJECTION INTRAMUSCULAR; INTRAVENOUS ONCE
Status: COMPLETED | OUTPATIENT
Start: 2023-08-06 | End: 2023-08-06

## 2023-08-05 RX ORDER — LISINOPRIL 20 MG/1
20 TABLET ORAL 2 TIMES DAILY
Status: DISCONTINUED | OUTPATIENT
Start: 2023-08-05 | End: 2023-08-06 | Stop reason: HOSPADM

## 2023-08-05 RX ORDER — EZETIMIBE 10 MG/1
10 TABLET ORAL NIGHTLY
Status: DISCONTINUED | OUTPATIENT
Start: 2023-08-06 | End: 2023-08-06 | Stop reason: HOSPADM

## 2023-08-05 RX ADMIN — ASPIRIN 81 MG: 81 TABLET, CHEWABLE ORAL at 10:43

## 2023-08-05 RX ADMIN — APIXABAN 2.5 MG: 2.5 TABLET, FILM COATED ORAL at 09:15

## 2023-08-05 RX ADMIN — SODIUM CHLORIDE, PRESERVATIVE FREE 10 ML: 5 INJECTION INTRAVENOUS at 10:51

## 2023-08-05 RX ADMIN — LISINOPRIL 10 MG: 10 TABLET ORAL at 09:15

## 2023-08-05 RX ADMIN — FERROUS SULFATE TAB 325 MG (65 MG ELEMENTAL FE) 325 MG: 325 (65 FE) TAB at 09:15

## 2023-08-05 RX ADMIN — APIXABAN 2.5 MG: 2.5 TABLET, FILM COATED ORAL at 21:26

## 2023-08-05 RX ADMIN — LISINOPRIL 20 MG: 20 TABLET ORAL at 21:26

## 2023-08-05 NOTE — PLAN OF CARE
Problem: Discharge Planning  Goal: Discharge to home or other facility with appropriate resources  Outcome: Progressing  Flowsheets (Taken 8/5/2023 8189)  Discharge to home or other facility with appropriate resources:   Arrange for needed discharge resources and transportation as appropriate   Identify barriers to discharge with patient and caregiver  Note: Patient will have all needs met prior to discharge      Problem: Safety - Adult  Goal: Free from fall injury  Outcome: Progressing  Note: Patient will remain fall free during stay by implementing and following all safety precautions.

## 2023-08-05 NOTE — PLAN OF CARE
Problem: Safety - Adult  Goal: Free from fall injury  8/5/2023 0346 by Gwendolyn Orourke RN  Outcome: Progressing  Note: All standard safety precautions in place. Call light within reach. No fall during this shift. Flowsheets (Taken 8/4/2023 1900)  Free From Fall Injury: Instruct family/caregiver on patient safety     Problem: Neurosensory - Adult  Goal: Achieves stable or improved neurological status  Outcome: Progressing  Note: Routine neurochecks done. NIHS score 0. Problem: Discharge Planning  Goal: Discharge to home or other facility with appropriate resources  8/5/2023 0346 by Gwendolyn Orourke RN  Outcome: Progressing  Note: CM is following for discharge. Flowsheets  Taken 8/5/2023 0300  Discharge to home or other facility with appropriate resources:   Identify barriers to discharge with patient and caregiver   Arrange for needed discharge resources and transportation as appropriate  Taken 8/4/2023 2300  Discharge to home or other facility with appropriate resources:   Arrange for needed discharge resources and transportation as appropriate   Identify barriers to discharge with patient and caregiver  Taken 8/4/2023 1900  Discharge to home or other facility with appropriate resources:   Identify barriers to discharge with patient and caregiver   Arrange for needed discharge resources and transportation as appropriate     Problem: ABCDS Injury Assessment  Goal: Absence of physical injury  Outcome: Progressing  Note: Pt free from physical injury.    Flowsheets (Taken 8/4/2023 1900)  Absence of Physical Injury: Implement safety measures based on patient assessment

## 2023-08-06 VITALS
SYSTOLIC BLOOD PRESSURE: 154 MMHG | BODY MASS INDEX: 28.88 KG/M2 | HEIGHT: 63 IN | RESPIRATION RATE: 18 BRPM | HEART RATE: 94 BPM | OXYGEN SATURATION: 100 % | DIASTOLIC BLOOD PRESSURE: 81 MMHG | TEMPERATURE: 97.3 F | WEIGHT: 163 LBS

## 2023-08-06 LAB
ANION GAP SERPL CALCULATED.3IONS-SCNC: 7 MMOL/L (ref 3–16)
BASOPHILS # BLD: 0 K/UL (ref 0–0.2)
BASOPHILS NFR BLD: 0.1 %
BUN SERPL-MCNC: 12 MG/DL (ref 7–20)
CALCIUM SERPL-MCNC: 9.5 MG/DL (ref 8.3–10.6)
CHLORIDE SERPL-SCNC: 101 MMOL/L (ref 99–110)
CHOLEST SERPL-MCNC: 247 MG/DL (ref 0–199)
CO2 SERPL-SCNC: 26 MMOL/L (ref 21–32)
CREAT SERPL-MCNC: 0.6 MG/DL (ref 0.6–1.2)
DEPRECATED RDW RBC AUTO: 16 % (ref 12.4–15.4)
EOSINOPHIL # BLD: 0 K/UL (ref 0–0.6)
EOSINOPHIL NFR BLD: 1.1 %
EST. AVERAGE GLUCOSE BLD GHB EST-MCNC: 105.4 MG/DL
GFR SERPLBLD CREATININE-BSD FMLA CKD-EPI: >60 ML/MIN/{1.73_M2}
GLUCOSE SERPL-MCNC: 89 MG/DL (ref 70–99)
HBA1C MFR BLD: 5.3 %
HCT VFR BLD AUTO: 44.5 % (ref 36–48)
HDLC SERPL-MCNC: 69 MG/DL (ref 40–60)
HGB BLD-MCNC: 15.1 G/DL (ref 12–16)
LDLC SERPL CALC-MCNC: 162 MG/DL
LYMPHOCYTES # BLD: 0.9 K/UL (ref 1–5.1)
LYMPHOCYTES NFR BLD: 24.3 %
MCH RBC QN AUTO: 30.7 PG (ref 26–34)
MCHC RBC AUTO-ENTMCNC: 34 G/DL (ref 31–36)
MCV RBC AUTO: 90.4 FL (ref 80–100)
MONOCYTES # BLD: 0.3 K/UL (ref 0–1.3)
MONOCYTES NFR BLD: 9.5 %
NEUTROPHILS # BLD: 2.3 K/UL (ref 1.7–7.7)
NEUTROPHILS NFR BLD: 65 %
PLATELET # BLD AUTO: 165 K/UL (ref 135–450)
PMV BLD AUTO: 10.9 FL (ref 5–10.5)
POTASSIUM SERPL-SCNC: 4.4 MMOL/L (ref 3.5–5.1)
RBC # BLD AUTO: 4.92 M/UL (ref 4–5.2)
SODIUM SERPL-SCNC: 134 MMOL/L (ref 136–145)
TRIGL SERPL-MCNC: 81 MG/DL (ref 0–150)
VLDLC SERPL CALC-MCNC: 16 MG/DL
WBC # BLD AUTO: 3.5 K/UL (ref 4–11)

## 2023-08-06 PROCEDURE — 36415 COLL VENOUS BLD VENIPUNCTURE: CPT

## 2023-08-06 PROCEDURE — 85025 COMPLETE CBC W/AUTO DIFF WBC: CPT

## 2023-08-06 PROCEDURE — 6370000000 HC RX 637 (ALT 250 FOR IP): Performed by: INTERNAL MEDICINE

## 2023-08-06 PROCEDURE — 99232 SBSQ HOSP IP/OBS MODERATE 35: CPT | Performed by: NURSE PRACTITIONER

## 2023-08-06 PROCEDURE — 6360000002 HC RX W HCPCS: Performed by: FAMILY MEDICINE

## 2023-08-06 PROCEDURE — 2580000003 HC RX 258: Performed by: INTERNAL MEDICINE

## 2023-08-06 PROCEDURE — 80048 BASIC METABOLIC PNL TOTAL CA: CPT

## 2023-08-06 RX ORDER — FUROSEMIDE 40 MG/1
40 TABLET ORAL DAILY
Status: DISCONTINUED | OUTPATIENT
Start: 2023-08-06 | End: 2023-08-06

## 2023-08-06 RX ORDER — FUROSEMIDE 40 MG/1
40 TABLET ORAL DAILY
Qty: 60 TABLET | Refills: 3 | Status: SHIPPED | OUTPATIENT
Start: 2023-08-06

## 2023-08-06 RX ORDER — LISINOPRIL 20 MG/1
20 TABLET ORAL 2 TIMES DAILY
Qty: 30 TABLET | Refills: 3 | Status: SHIPPED | OUTPATIENT
Start: 2023-08-06

## 2023-08-06 RX ORDER — EZETIMIBE 10 MG/1
10 TABLET ORAL NIGHTLY
Qty: 30 TABLET | Refills: 3 | Status: SHIPPED | OUTPATIENT
Start: 2023-08-06

## 2023-08-06 RX ORDER — FUROSEMIDE 20 MG/1
20 TABLET ORAL DAILY
Status: DISCONTINUED | OUTPATIENT
Start: 2023-08-06 | End: 2023-08-06 | Stop reason: HOSPADM

## 2023-08-06 RX ORDER — FERROUS SULFATE 325(65) MG
325 TABLET ORAL
Qty: 30 TABLET | Refills: 3 | Status: SHIPPED | OUTPATIENT
Start: 2023-08-07

## 2023-08-06 RX ORDER — FUROSEMIDE 20 MG/1
20 TABLET ORAL DAILY
Qty: 60 TABLET | Refills: 3 | Status: SHIPPED | OUTPATIENT
Start: 2023-08-06

## 2023-08-06 RX ORDER — ASPIRIN 81 MG/1
81 TABLET, CHEWABLE ORAL DAILY
Qty: 30 TABLET | Refills: 3 | Status: SHIPPED | OUTPATIENT
Start: 2023-08-07

## 2023-08-06 RX ADMIN — HYDRALAZINE HYDROCHLORIDE 10 MG: 20 INJECTION INTRAMUSCULAR; INTRAVENOUS at 00:02

## 2023-08-06 RX ADMIN — APIXABAN 2.5 MG: 2.5 TABLET, FILM COATED ORAL at 09:24

## 2023-08-06 RX ADMIN — FERROUS SULFATE TAB 325 MG (65 MG ELEMENTAL FE) 325 MG: 325 (65 FE) TAB at 09:24

## 2023-08-06 RX ADMIN — SODIUM CHLORIDE, PRESERVATIVE FREE 10 ML: 5 INJECTION INTRAVENOUS at 09:25

## 2023-08-06 RX ADMIN — FUROSEMIDE 20 MG: 20 TABLET ORAL at 11:15

## 2023-08-06 RX ADMIN — LISINOPRIL 20 MG: 20 TABLET ORAL at 09:24

## 2023-08-06 RX ADMIN — ASPIRIN 81 MG: 81 TABLET, CHEWABLE ORAL at 09:24

## 2023-08-06 NOTE — CARE COORDINATION
Case Management Assessment            Discharge Note                    Date / Time of Note: 8/6/2023 10:00 AM                  Discharge Note Completed by: LEONARD Soto, FRANKW    Patient Name: Yvan Arevalo   YOB: 1935  Diagnosis: Dizziness [R42]   Date / Time: 8/4/2023 12:17 AM    Current PCP: Rafael Warren DO  Clinic patient: No    Hospitalization in the last 30 days: No       Advance Directives:  Code Status: Full Code  [place] DNR form completed and on chart: No    Financial:  Payor: Ian Wells / Plan: Julia Ann PPO / Product Type: Medicare /      Pharmacy:    [id number] [facility], [address] [place] [address],[address] [place] [address] [address] [phone] -  [phone]  [address] [facility] [address]  Phone: [phone] Fax: [phone]    [place] [phone]  [place] [address] [place] [id number]  Phone: [phone] Fax: [phone]      Assistance purchasing medications?: Potential Assistance Purchasing Medications: No  Assistance provided by Case Management: None at this time    Does patient want to participate in local refill/ meds to beds program?:      Meds To Beds General Rules:  1. Can ONLY be done Monday- Friday between 8:30am-5pm  2. Prescription(s) must be in pharmacy by 3pm to be filled same day  3. Copy of patient's insurance/ prescription drug card and patient face sheet must be sent along with the prescription(s)  4. Cost of Rx cannot be added to hospital bill. If financial assistance is needed, please contact unit  or ;  or  CANNOT provide pharmacy voucher for patients co-pays  5.  Patients can then  the prescription on their way out of the hospital at discharge, or pharmacy can deliver to the bedside if staff is available. (payment due at time of pick-up or delivery - cash, check, or card accepted)     Able to afford

## 2023-08-06 NOTE — PLAN OF CARE
Problem: Discharge Planning  Goal: Discharge to home or other facility with appropriate resources  8/6/2023 1208 by Tony Funez RN  Outcome: Progressing  Pt involved in discharge planning. Barriers to discharge discussed with patient. Discharge learning needs identified. Discuss with patient any additional needed resources and transportation plans. Case management following plan of care. Problem: Safety - Adult  Goal: Free from fall injury  8/6/2023 1208 by Tony Funez RN  Outcome: Progressing   All fall precautions in place. Bed locked and in lowest position with alarm on. Overbed table and personal belonings within reach. Call light within reach and patient instructed to use call light for assistance. Non-skid socks on. Problem: Skin/Tissue Integrity  Goal: Absence of new skin breakdown  Description: 1. Monitor for areas of redness and/or skin breakdown  2. Assess vascular access sites hourly  3. Every 4-6 hours minimum:  Change oxygen saturation probe site  4. Every 4-6 hours:  If on nasal continuous positive airway pressure, respiratory therapy assess nares and determine need for appliance change or resting period. Outcome: Progressing   No evidence of new skin breakdown. Pillow support and repositioning utilized.

## 2023-08-06 NOTE — PLAN OF CARE
Briefly, patient is a 80 y.o woman with history of prior CVA in 2015 (residual left sided weakness), CAD, aortic valve disease, and AFIB (on Eliquis) who presents with 2 days of dizziness and near-syncope. Patient presented to the hospital with an initial blood pressure of 228/113. She has been hypertensive with SBP mostly in the 190s throughout her admission, which hospitalist has been managing. Her BP's are improving with most recent BP reading of 152/80. Patient otherwise without any acute events overnight. Per nursing, patient's neurologic exam unchanged, NIHSS 0. Hbg A1c 5.2%. Lipid panel significant for total cholesterol 240, HDL 67, . Patient has allergy to statins. .  Patient has had thorough stroke work-up performed, which was negative for acute stroke. Suspect that her dizziness and symptoms are most likely attributed to recrudescence from her prior cerebellar stroke, which is likely precipitated by her hypertensive urgency. However given her significant cardiac history, history of stroke, and in light of her vascular risk factors, TIA cannot be fully excluded. Plan:  - Continue Eliquis and ASA  -Patient not able to be started on statin due to allergy. Will start zetia. - Every 4 hours neurochecks  - NIHSS per guidelines  - PT/OT/SLP treat and eval  - Telemetry  - Daily orthostatic vital signs  - Goal LDL less than 70  - Goal hemoglobin A1c less than 7%  - Hospitalist to treat hypertensive and maximize   - Goal SBP < 140/90.   - Follow-up with further [facility] neurology in 3 months   - Follow-up with Dr. Sana Moreno brain and spine in 4 to 6 weeks. Will need repeat CTA head and neck prior to appointment      At this time, neurology service will sign off. Please do not hesitate to contact us with any questions, concerns or change in neurologic  status.      LADAN Duffy - CNP

## 2023-08-06 NOTE — DISCHARGE SUMMARY
V2.0  Discharge Summary    Name:  Kei Foley /Age/Sex: 1935 (80 y.o. female)   Admit Date: 2023  Discharge Date: 23    MRN & CSN:  [id number] & [id number] Encounter Date and Time 23 12:49 PM EDT    Attending:  Nayana Garcia MD Discharging Provider: Nayana Garcia MD       Hospital Course:     Kei Foley is a 80 y.o. female with medical history significant for prior stroke ,HTN, CAD, AVR (On eliquis) who is presenting with dizziness and confusion. Patient was sent from Cardiology office where she had presented with days to weeks of dizziness and blurry vision. On day of admission she had a presyncopal episode with almost fainting sensation and darkening of her vision for a second. Assessment/Plan :   1.  Dizziness and presyncope. 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 Telemetry did show normal sinus rhythm. She was out of TNK/tPA window. MRI brain without acute stroke. Neurology recommended antiplatelet monotherapy and Eliquis for A-fib. Cardiology were also following and will mail Sherice blanchard to her for longer cardiac monitoring. She will follow-up with neurology in 6 to 8 weeks. She will also follow-up with physical therapy as outpatient. 2.  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 3.  AVR with periprosthetic leak. 4.  Paroxysmal A-fib  5. CAD  6. Essential hypertension. Uncontrolled and in the dose of lisinopril to 20 mg daily. She also takes Lasix 20 mg daily and will resume on discharge. The patient expressed appropriate understanding of, and agreement with the discharge recommendations, medications, and plan.      Consults this admission:  IP CONSULT TO NEUROLOGY  IP CONSULT TO CARDIOLOGY  IP CONSULT TO NEUROSURGERY    Discharge

## 2023-08-06 NOTE — PLAN OF CARE
Problem: Discharge Planning  Goal: Discharge to home or other facility with appropriate resources  8/6/2023 0137 by Ryne Juarez RN  Outcome: Progressing  Note: CM is following for discharge  Flowsheets  Taken 8/5/2023 2300  Discharge to home or other facility with appropriate resources:   Identify barriers to discharge with patient and caregiver   Arrange for needed discharge resources and transportation as appropriate  Taken 8/5/2023 1900  Discharge to home or other facility with appropriate resources:   Identify barriers to discharge with patient and caregiver   Arrange for needed discharge resources and transportation as appropriate     Problem: ABCDS Injury Assessment  Goal: Absence of physical injury  Outcome: Progressing  Note: Pt free from physical injury. Flowsheets (Taken 8/6/2023 0134)  Absence of Physical Injury: Implement safety measures based on patient assessment     Problem: Safety - Adult  Goal: Free from fall injury  8/6/2023 0137 by Ryne Juarez RN  Outcome: Progressing  Note: All standard safety precautions in place, call light within reach. No fall injury during this shift. Flowsheets (Taken 8/6/2023 0134)  Free From Fall Injury: Instruct family/caregiver on patient safety     Problem: Neurosensory - Adult  Goal: Achieves stable or improved neurological status  Outcome: Progressing  Note: Routine neurochecks done.

## 2023-08-07 ENCOUNTER — NURSE ONLY (OUTPATIENT)
Dept: CARDIOLOGY CLINIC | Age: 88
End: 2023-08-07

## 2023-08-07 LAB
EST. AVERAGE GLUCOSE BLD GHB EST-MCNC: 111.2 MG/DL
HBA1C MFR BLD: 5.5 %

## 2023-08-08 NOTE — PROGRESS NOTES
23 1344   Encounter Summary   Encounter Overview/Reason  Initial Encounter   Service Provided For: Patient and family together   Referral/Consult From: Geronimo   Last Encounter  23  (mercy)   Complexity of Encounter Moderate   Begin Time 1110   End Time  1145   Total Time Calculated 35 min   Encounter    Type Initial Screen/Assessment   Assessment/Intervention/Outcome   Assessment Calm; Hopeful   Intervention Active listening;Discussed relationship with God;Explored/Affirmed feelings, thoughts, concerns; Empowerment;Nurtured Hope;Prayer (assurance of)/[place]   Outcome Comfort;Engaged in conversation;Expressed feelings, needs, and concerns;Expressed Gratitude;Receptive; Optimistic     PT was found with her daughter at bedside. PT provided a brief clinical update and then some life review as her   a few months ago,  some 65yrs. She is [facility] and active in her [employer]. Prayer was asked for and provided. Daughter Angela Arcos is also [facility] and attends [employer].   Staff Marisabel Cueot MA, [facility]
4 Eyes Skin Assessment     NAME:  Avtar Eid  YOB: 1935  MEDICAL RECORD NUMBER:  [id number]    The patient is being assessed for  Admission    I agree that at least one RN has performed a thorough Head to Toe Skin Assessment on the patient. ALL assessment sites listed below have been assessed. Areas assessed by both nurses:    Head, Face, Ears, Shoulders, Back, Chest, Arms, Elbows, Hands, Sacrum. Buttock, Coccyx, Ischium, Legs. Feet and Heels, Under Medical Devices , and Other          Does the Patient have a Wound?  No noted wound(s)       Darrell Prevention initiated by RN: No  Wound Care Orders initiated by RN: No    Pressure Injury (Stage 3,4, Unstageable, DTI, NWPT, and Complex wounds) if present, place Wound referral order by RN under : No    New Ostomies, if present place, Ostomy referral order under : No     Nurse 1 eSignature: Electronically signed by Tanner Bae RN on 8/4/23 at 1:36 AM EDT    **SHARE this note so that the co-signing nurse can place an eSignature**    Nurse 2 eSignature: Electronically signed by Barron Zamudio RN on 8/4/23 at 1:43 AM EDT
4 Eyes Skin Assessment     NAME:  Renea Eid  YOB: 1935  MEDICAL RECORD NUMBER:  [id number]    The patient is being assessed for  Admission    I agree that at least one RN has performed a thorough Head to Toe Skin Assessment on the patient. ALL assessment sites listed below have been assessed. Areas assessed by both nurses:    Head, Face, Ears, Shoulders, Back, Chest, Arms, Elbows, Hands, Sacrum. Buttock, Coccyx, Ischium, Legs. Feet and Heels, Under Medical Devices , and Other          Does the Patient have a Wound?  No noted wound(s)       Darrell Prevention initiated by RN: No  Wound Care Orders initiated by RN: No    Pressure Injury (Stage 3,4, Unstageable, DTI, NWPT, and Complex wounds) if present, place Wound referral order by RN under : No    New Ostomies, if present place, Ostomy referral order under : No     Nurse 1 eSignature: Electronically signed by Junior Vargas RN on 8/4/23 at 1:36 AM EDT    **SHARE this note so that the co-signing nurse can place an eSignature**    Nurse 2 eSignature: Electronically signed by Luis Patel RN on 8/4/23 at 1:41 AM EDT
Cardiology - PROGRESS NOTE    Admit Date: 8/4/2023     Chief Complaint: dizziness     Interval History:   Patient seen and examined and notes reviewed. Patient is being followed for dizziness. Still havig occ c/o dizziness. Telemetry unrevealing, SB/NSR. Echo unrevealing. Denies CP, palps, SOB, BLE edema. In: 290 [P.O.:290]  Out: -    Wt Readings from Last 2 Encounters:   08/04/23 163 lb (73.9 kg)   08/04/23 163 lb (73.9 kg)         Data:   Scheduled Meds:   Scheduled Meds:   apixaban  2.5 mg Oral BID    ferrous sulfate  325 mg Oral Daily with breakfast    lisinopril  10 mg Oral BID    sodium chloride flush  5-40 mL IntraVENous 2 times per day    aspirin  81 mg Oral Daily    Or    aspirin  300 mg Rectal Daily     Continuous Infusions:   sodium chloride       PRN Meds:.sodium chloride flush, sodium chloride, ondansetron **OR** ondansetron, polyethylene glycol, perflutren lipid microspheres  Continuous Infusions:   sodium chloride         Intake/Output Summary (Last 24 hours) at 8/5/2023 1119  Last data filed at 8/5/2023 0600  Gross per 24 hour   Intake 50 ml   Output --   Net 50 ml       CBC:   Lab Results   Component Value Date/Time    WBC 3.5 08/03/2023 03:54 PM    HGB 13.7 08/03/2023 03:54 PM     08/03/2023 03:54 PM     BMP:  Lab Results   Component Value Date/Time     08/03/2023 04:44 PM    K 4.6 08/03/2023 04:44 PM     08/03/2023 04:44 PM    CO2 27 08/03/2023 04:44 PM    BUN 16 08/03/2023 04:44 PM    CREATININE 0.6 08/03/2023 04:44 PM    GLUCOSE 90 08/03/2023 04:44 PM     INR:   Lab Results   Component Value Date/Time    INR 1.04 10/01/2020 03:19 PM    INR 0.98 11/30/2015 10:15 AM    INR 0.96 11/17/2015 09:30 AM        CARDIAC LABS  ENZYMES:No results for input(s): CKMB, CKMBINDEX, TROPONINI in the last 72 hours.     Invalid input(s): CKTOTAL;3  FASTING LIPID PANEL:  Lab Results   Component Value Date/Time    HDL 79 09/18/2020 09:04 AM
Cardiology - PROGRESS NOTE    Admit Date: 8/4/2023     Chief Complaint: dizziness     Interval History:   Patient seen and examined and notes reviewed. Patient is being followed for dizziness. Still having occ c/o dizziness. Telemetry unrevealing, SB/NSR. Echo unrevealing. Denies CP, palps, SOB, BLE edema. BP's improved from yesterday, lisinopril increased to 20 mg BID.     In: 460 [P.O.:460]  Out: -    Wt Readings from Last 2 Encounters:   08/04/23 163 lb (73.9 kg)   08/04/23 163 lb (73.9 kg)         Data:   Scheduled Meds:   Scheduled Meds:   furosemide  20 mg Oral Daily    lisinopril  20 mg Oral BID    ezetimibe  10 mg Oral Nightly    apixaban  2.5 mg Oral BID    ferrous sulfate  325 mg Oral Daily with breakfast    sodium chloride flush  5-40 mL IntraVENous 2 times per day    aspirin  81 mg Oral Daily    Or    aspirin  300 mg Rectal Daily     Continuous Infusions:   sodium chloride       PRN Meds:.sodium chloride flush, sodium chloride, ondansetron **OR** ondansetron, polyethylene glycol, perflutren lipid microspheres  Continuous Infusions:   sodium chloride         Intake/Output Summary (Last 24 hours) at 8/6/2023 0946  Last data filed at 8/6/2023 0600  Gross per 24 hour   Intake 460 ml   Output --   Net 460 ml       CBC:   Lab Results   Component Value Date/Time    WBC 3.5 08/06/2023 06:56 AM    HGB 15.1 08/06/2023 06:56 AM     08/06/2023 06:56 AM     BMP:  Lab Results   Component Value Date/Time     08/06/2023 06:56 AM    K 4.4 08/06/2023 06:56 AM     08/06/2023 06:56 AM    CO2 26 08/06/2023 06:56 AM    BUN 12 08/06/2023 06:56 AM    CREATININE 0.6 08/06/2023 06:56 AM    GLUCOSE 89 08/06/2023 06:56 AM     INR:   Lab Results   Component Value Date/Time    INR 1.04 10/01/2020 03:19 PM    INR 0.98 11/30/2015 10:15 AM    INR 0.96 11/17/2015 09:30 AM        CARDIAC LABS  ENZYMES:No results for input(s): CKMB, CKMBINDEX, TROPONINI in the last 72
No neuro changes overnight, NIH remains 0. Voiding well via BRP. Pt. Denies pain. All fall precautions in place and call light is within reach.
PT admitted in [address] [place] [address]. PT alert and oriented x4. VSS with elevation to BP. SPO2 maintained at Room Air. GCS is 15. NIHS score is 0. No neuro changes overnight. PT is on regular diet and tolerating well. PT up to the bathroom,contact guard assist with GB and walker and no bowel movement during this shift. Planned for MRI and consent has been taken. She has loop recorder and stent placement with coronary angioplasty in 2004. All standard safety precautions in place, bed in lowest position, wheels locked, non slip socks on, call light within reach. No fall injury during this shift.
Patient discharged to home with family. VSS. IV removed. All belongings gathered and taken with family. Educated on follow up appointments and home medications. All questions answered. Patient transported to [facility] for discharge.      Electronically signed by Ricki Blankenship RN on 8/6/2023 at 1:17 PM
Patient is alert and oriented x 4. Patient has had elevated blood pressure throughout shift, hospitalist and neuro aware. Delayed discharge for continued monitoring. All safety precautions in place. No further concerns at this time.
Patient is alert and oriented x 4. VSS on room air. No complaints of pain this shift. Blood pressure has been trending as elevated this shift in the 190s, however the same trend was reflected on 8/3. Patient has been comfortable with family at bedside throughout day. Echo and MRI completed this shift. Awaiting results for possible discharge plan tomorrow. All safety precautions in place. Patient in currently in chair eating dinner with daughter at bedside.  Will continue to monitor
Physical Therapy  Facility/Department: [facility]  Physical Therapy Initial Assessment/Treatment    Name: Nathaniel Walker  : 1935  MRN: [id number]  Date of Service: 2023    Discharge Recommendations: Nathaniel Walker scored a 18/24 on the AM-PAC short mobility form. Current research shows that an AM-PAC score of 18 or greater is typically associated with a discharge to the patient's home setting. Based on the patient's AM-PAC score and their current functional mobility deficits, it is recommended that the patient have 2-3 sessions per week of Physical Therapy at d/c to increase the patient's independence. At this time, this patient demonstrates the endurance and safety to discharge home with  (OP services) and a follow up treatment frequency of 2-3x/wk. Please see assessment section for further patient specific details. If patient discharges prior to next session this note will serve as a discharge summary. Please see below for the latest assessment towards goals. PT Equipment Recommendations  Equipment Needed: No      Patient Diagnosis(es): There were no encounter diagnoses. Past Medical History:  has a past medical history of Aortic valve disease, CAD (coronary artery disease), CVA (cerebral infarction), and Hypertension. Past Surgical History:  has a past surgical history that includes Coronary angioplasty with stent (2004); Total knee arthroplasty (Right, 7/15/2013); Cataract removal with implant (Left, 10/9/2015); Cardiac catheterization (2015); Aortic valve replacement (2015); Upper gastrointestinal endoscopy (N/A, 2020); and Colonoscopy (N/A, 2020). Assessment   Assessment: 79 yo adm for blurred vision, dizziness & concern for stroke. Pt demo good ability to ambulate in nice with use of walker and no difficulty going up/down a few stairs. Pt often uses cane at home- did try cane today but did not appear as steady as with the walker.
Physician Progress Note      Eusebia Herbert  [facility] #:                  [id number]  :                       1935  ADMIT DATE:       2023 12:17 AM  DISCH DATE:        2023 1:00 PM  RESPONDING  PROVIDER #:        Georgia Espinoza MD          QUERY TEXT:    Patient admitted with Dizziness noted to have Paroxysmal atrial fibrillation   and is maintained on Eliquis. If possible, please document in progress notes   and discharge summary if you are evaluating and/or treating any of the   following: The medical record reflects the following:  Risk Factors: history of CVA, HTN, A-fib  Clinical Indicators: per Cardiology notes on  \"pAF; CHADSVASc at least 4,   On Eliquis 2.5 mg BID\"  Treatment: CBC, CMP, ECHO, Cardiology and Neurology consults, inpatient   medication includes Eliquis 2.5mg twice a day,  telemetry monitoring  Options provided:  -- Secondary hypercoagulable state in a patient with atrial fibrillation  -- Other - I will add my own diagnosis  -- Disagree - Not applicable / Not valid  -- Disagree - Clinically unable to determine / Unknown  -- Refer to Clinical Documentation Reviewer    PROVIDER RESPONSE TEXT:    This patient has secondary hypercoagulable state in a patient with atrial   fibrillation.     Query created by: Dahlia Gonzalez on 2023 6:19 PM      Electronically signed by:  Georgia Espinoza MD 2023 8:37 AM
Pt Alert and Oriented x4, follow commands, afebrile. VSS with exceptions to the BP, informed to the doctor and PRN hydralazine has been administered as per [facility]. SPO2 maintained on room air. No neuro changes: NIHS score 0. Pt on regular diet, low sodium diet and tolerating well. Pt up to the toilet with GB& walker, no bowel movements during shift period. Bed in low position,bed alarm on, call light within reach and no fall during this shift.
Pt. BP @2300 192/91 HR 63. One time dose 10 mg hydralazine ordered and given.
Pt. BP recheck 143/81
Speech Language Pathology  Facility/Department:[facility] 5T ORTHO/NEURO  Therapy Evaluation & Discharge                                                       Name: Glen Harper  : 1935  MRN: [id number]    Patient Diagnosis(es):   Patient Active Problem List    Diagnosis Date Noted    Dizziness 2023    Atrial fibrillation with rapid ventricular response ([address])     Hyperlipidemia 2020    Anemia 2020    Iron deficiency anemia     Encounter for electronic analysis of reveal event recorder 2019    Cardiac arrhythmia     Cerebrovascular accident (CVA) ([address]) 2019    PAF (paroxysmal atrial fibrillation) ([address]) 2018    Coronary artery disease involving native coronary artery of native heart with angina pectoris ([address])     Pneumothorax     Nonrheumatic aortic valve stenosis     S/P AVR (aortic valve replacement)     Cerebral infarction ([address]) 10/13/2015    Obstructive sleep apnea syndrome 2014    Adult body mass index greater than 30 07/15/2013    Osteoarthritis of knee 07/15/2013    Essential hypertension 05/10/2013       Past Medical History:   Diagnosis Date    Aortic valve disease     CAD (coronary artery disease)     CVA (cerebral infarction) 10/2015    Hypertension      Past Surgical History:   Procedure Laterality Date    AORTIC VALVE REPLACEMENT  2015    [facility]   [address] [facility] [facility] [address] [facility]  2015    Dr. Emanuel Patrick - no flow restrictive stenoses in coronary arteries    CATARACT REMOVAL WITH IMPLANT Left 10/9/2015    COLONOSCOPY N/A 2020    COLONOSCOPY POLYPECTOMY SNARE/COLD BIOPSY performed by Ifeanyi Mendosa MD at [address] [place] [address]  2004    TOTAL KNEE ARTHROPLASTY Right 7/15/2013    UPPER GASTROINTESTINAL ENDOSCOPY N/A 2020    EGD BIOPSY performed by [facility] at [facility]       Reason for Referral:  Glen Harper  was referred for a Speech Therapy
Speech-Language Pathology    Received evaluation order, reviewed chart. Attempted to see patient, however she currently off the unit. Will re-attempt at later time, schedule permitting.     Srinath Davison [facility], .[id number]  Ph. # [phone]
Stroke Admission    I agree as the admission nurse that I have completed a thorough neurologic assessment and completed the admission on the patient. ALL assessment areas listed below have been addressed and completed. Presentation: TIA    Handoff assessment completed with ,Samantha PRINCE. Current NIHSS 0. [x]   Education Assessment  [x]   Education template added (STROKE/TIA), selecting ONLY patient specific risk factors: Hypertension, Atrial fibrillation, Personal history of heart disease, and    [x]   Care Plan template added (Physiologic Instability - Neurosensory). Selecting this will add the care plan rows to the flow sheet under the Neuro section of Head to Toe. [x]   Bedside swallow screen completed using the [facility] Protocol, and documented PRIOR to any PO meds, food or drink: Pass  [x]   VTE Prophylaxis: SCDs ordered/addressed; SCDs: N/A Eliquis           (As a reminder, ASA, Plavix and TPA/TNK are not VTE prophylaxis.)  [x]   Stroke education booklet given, and education initiated with patient and/or caregiver.       Nurse eSignature: Electronically signed by Jesi Rivera RN on 8/4/23 at 1:46 AM EDT
V2.0  [facility] Daily Progress Note      Name:  Nate Vaughn /Age/Sex: 1935  (80 y.o. female)   MRN & CSN:  [id number] & [id number] Encounter Date/Time: 2023 2:52 PM EDT    Location:  8561/1819-15 PCP: Kirti Bentley Day: 2    Assessment and Plan:   Nate Vaughn is a 80 y.o. female with medical history significant for prior stroke ,HTN, CAD, AVR (On eliquis) who is presenting with dizziness and confusion. Patient was sent from Cardiology office where she had presented with days to weeks of dizziness and blurry vision. On day of admission she had a presyncopal episode with almost fainting sensation and darkening of her vision for a second. Assessment/Plan :   1.  Dizziness and presyncope. 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 Telemetry did show normal sinus rhythm. Neuro check every 4 hours. She was out of TNK/tPA window. MRI brain is pending. Neurology evaluation is pending. Telemetry neurology recommended antiplatelet monotherapy and Eliquis for A-fib. Cardiology are also following. 2.  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 3.  AVR with periprosthetic leak. 4.  Paroxysmal A-fib  5. CAD  6. Essential hypertension. Uncontrolled and will increase lisinopril to 20 mg daily and closely monitor. It appears that she was on metoprolol as well which was discontinued due to unclear reason. Diet ADULT DIET; Regular;  Low Sodium (2 gm)  ADULT ORAL NUTRITION SUPPLEMENT; Breakfast, Dinner; Standard High Calorie/High Protein Oral Supplement   DVT Prophylaxis [x] Lovenox, []  Heparin, [] SCDs, [] Ambulation,  [] Eliquis, [] Xarelto  [] Coumadin   GI Prophylaxis [] Yes          [x] No   Code Status Full Code    Disposition 1 more day pending
THERAPIES  ADULT DIET; Regular; Low Sodium (2 gm)  ADULT ORAL NUTRITION SUPPLEMENT; Breakfast, Dinner; Standard High Calorie/High Protein Oral Supplement  PO Intake: Unable to assess (diet just advanced)   PO Supplement Intake:None Ordered  Additional Sources of Calories/IVF:N/A     COMPARATIVE STANDARDS  Energy (kcal):  8699-8509 (20-25 kcal/kg CBW)     Protein (g):  60-75 (0.8-1.0 g/kg CBW)       Fluid (ml/day):  or per MD    ANTHROPOMETRICS  Current Height: 5' 3\" (160 cm)  Current Weight - Scale: 163 lb (73.9 kg)    Admission weight: 163 lb (73.9 kg)    The patient will be monitored per nutrition standards of care. Consult dietitian if additional nutrition interventions are needed prior to RD reassessment.      Bam Parada, [address] [address]:  [phone]  Office:  [phone]
Functional mobility for item retrieval in room spvn - Not met       Therapy Time   Individual Concurrent Group Co-treatment   Time In 0835         Time Out 0950         Minutes 75          Timed Code Tx Min: 60  Total Tx Time: 75       Mary Jane Turcios, OT
intracranial abnormality. CTA head and neck reported severe supraclinoid ICA stenosis and a right ICA 8mm aneurysm. MRI without acute infarction, but notable for chronic small vessel disease, remote left cerebellar infarct, and remote lacunar infarcts. Patient's echo was obtained, similar to echo taken on 4/21/2023. Neurovascular surgery evaluated CTA head and neck imaging who recommended aspirin with current anticoagulation of Eliquis given her RVA thrombus versus ulcerated plaque. Recommended conservative management and medical management for stroke risk reduction given other findings found on CTA head and neck. Patient presented to the hospital with an initial blood pressure of 228/113. Patient's blood pressure remains hypertensive with most recent blood pressure being 199/82. Patient is asymptomatic currently. Patient has had thorough stroke work-up performed, which was negative for acute stroke. Suspect that her dizziness and symptoms are most likely attributed to recrudescence from her prior cerebellar stroke, which is likely precipitated by her hypertensive urgency. However given her significant cardiac history, history of stroke, and in light of her vascular risk factors, TIA cannot be fully excluded. Plan:  Imaging / Labs:   -Check lipid panel and hemoglobin A1C (ordered)  - Continue Eliquis and ASA  -Patient not able to be started on statin due to allergy.  - Every 4 hours neurochecks  - NIHSS per guidelines  - PT/OT/SLP treat and eval  -  Telemetry  - Daily orthostatic vital signs  -Goal LDL less than 70  - Goal hemoglobin A1c less than 7%  - Hospitalist to treat hypertensive urgency. Would NOT recommend discharging patient home with a systolic blood pressure trending in the 190s.   Discussed with primary hospitalist attending and nursing staff.  - Goal SBP < 150.   - Follow-up with further [facility] neurology in 3 months   - Follow-up with Dr. Paige Hou brain and spine in 4 to 6
of the head was performed without the administration of intravenous contrast. Automated exposure control, iterative reconstruction, and/or weight based adjustment of the mA/kV was utilized to reduce the radiation dose to as low as reasonably achievable. COMPARISON: None. HISTORY: ORDERING SYSTEM PROVIDED HISTORY: cva? dizzy TECHNOLOGIST PROVIDED HISTORY: Reason for exam:->cva? dizzy Has a \"code stroke\" or \"stroke alert\" been called? ->No Decision Support Exception - unselect if not a suspected or confirmed emergency medical condition->Emergency Medical Condition (MA) Reason for Exam: CVA? Dizzy. Dizziness (Pt from [facility], concern for stroke, dizziness and blurry vision for couple days, never completely goes away but does get better and worse, pt states an hour ago felt like she was going faint and vision went black, pt states today is worse day). ; ORDERING SYSTEM PROVIDED HISTORY: cva? dizzy TECHNOLOGIST PROVIDED HISTORY: Reason for exam:->cva? dizzy Has a \"code stroke\" or \"stroke alert\" been called? ->No Decision Support Exception - unselect if not a suspected or confirmed emergency medical condition->Emergency Medical Condition (MA) Reason for Exam: cva? dizzy FINDINGS: CTA NECK: AORTIC ARCH/ARCH VESSELS: No dissection or arterial injury. No significant stenosis of the brachiocephalic or subclavian arteries. CAROTID ARTERIES: Approximate 50% diameter stenosis is identified within the proximal left cervical internal carotid artery. VERTEBRAL ARTERIES: No dissection, arterial injury, or significant stenosis. SOFT TISSUES: The lung apices are clear. No cervical or superior mediastinal lymphadenopathy. The larynx and pharynx are unremarkable. No acute abnormality of the salivary and thyroid glands. BONES: No acute osseous abnormality. CTA HEAD: ANTERIOR CIRCULATION: Severe stenosis is identified within the supraclinoid right and left internal carotid artery.   There is a fusiform aneurysm involving the right

## 2023-08-24 ENCOUNTER — OFFICE VISIT (OUTPATIENT)
Dept: CARDIOLOGY CLINIC | Age: 88
End: 2023-08-24
Payer: MEDICARE

## 2023-08-24 VITALS
HEART RATE: 76 BPM | BODY MASS INDEX: 28.17 KG/M2 | HEIGHT: 63 IN | DIASTOLIC BLOOD PRESSURE: 70 MMHG | WEIGHT: 159 LBS | OXYGEN SATURATION: 97 % | SYSTOLIC BLOOD PRESSURE: 122 MMHG

## 2023-08-24 DIAGNOSIS — E78.5 HYPERLIPIDEMIA, UNSPECIFIED HYPERLIPIDEMIA TYPE: ICD-10-CM

## 2023-08-24 DIAGNOSIS — I10 ESSENTIAL HYPERTENSION: ICD-10-CM

## 2023-08-24 DIAGNOSIS — I25.119 CORONARY ARTERY DISEASE INVOLVING NATIVE CORONARY ARTERY OF NATIVE HEART WITH ANGINA PECTORIS (HCC): Primary | Chronic | ICD-10-CM

## 2023-08-24 DIAGNOSIS — I35.0 AORTIC VALVE STENOSIS, ETIOLOGY OF CARDIAC VALVE DISEASE UNSPECIFIED: ICD-10-CM

## 2023-08-24 DIAGNOSIS — I48.0 PAF (PAROXYSMAL ATRIAL FIBRILLATION) (HCC): ICD-10-CM

## 2023-08-24 PROCEDURE — 1123F ACP DISCUSS/DSCN MKR DOCD: CPT | Performed by: INTERNAL MEDICINE

## 2023-08-24 PROCEDURE — 99214 OFFICE O/P EST MOD 30 MIN: CPT | Performed by: INTERNAL MEDICINE

## 2023-08-24 RX ORDER — PANTOPRAZOLE SODIUM 40 MG/1
TABLET, DELAYED RELEASE ORAL
Qty: 90 TABLET | Refills: 1 | Status: SHIPPED | OUTPATIENT
Start: 2023-08-24

## 2023-09-16 NOTE — PROGRESS NOTES
a maximum gradient of 21 mmHg and a mean gradient of 13 mmHg. Aortic valve  area of 1.2cm2. Trivial aortic regurgitation. Moderate to severe tricuspid regurgitation. RVSP 51mmHg. The right atrium is mildly dilated. Trivial pulmonic regurgitation. IVC size is normal (<2.1 cm) but collapses < 50% with respiration consistent with elevated RA pressure (8 mmHg). Echo 8/4/2023   Left ventricular cavity size is normal. There is mild-moderate concentric   left ventricular hypertrophy. Overall left ventricular systolic function   appears normal. Ejection fraction is visually estimated to be 60-65%. No   regional wall motion abnormalities are noted. Normal diastolic function. Moderate thickening of leaflet of mitral valve. Mild mitral regurgitation,   no stenosis. Bioprosthetic aortic valve. Mild aortic regurgitation. The   tricuspid valve is thickened. There is moderate tricuspid valve   regurgitation, no stenosis. Bi-atrial enlargement. IVC size is normal   (<2.1cm) and collapses > 50% with respiration consistent with normal RA   pressure (3mmHg). Estimated pulmonary artery systolic pressure is 31 mmHg   assuming a right atrial pressure of 3 mmHg. Assessment:  CAD  Stable    -denies anginal symptoms    -[facility] 2004> PCI of proximal LAD  -[facility] 2013> Cath with LM normal, LAD mid 30%, distal 40%, D1-40%, D4-50%(small), widely patent proximal stent, Cx Ostial 20%. RCA Mid 30% X2 and LVG 60%. Medically managed  -[facility] 2015> No flow-significant coronary stenoses are identified. The left anterior descending stent is widely patent. -[facility] 5/7/20> 2+ AI, EF 60, 40% LM, 40% in-stent LAD,40% ostial CFX, 30% RCA, Imp- high output CHF due to symptomatic anemia  Pt is on ASA and Eliquis     Aortic Valve Replacement   12/4/15 AVR Mosaic porcine performed by Dr. Olesya Jenkins  Echo 10/21- well seated  Echo 7/28/22 - well seated   Echo  4/21/23 > results above has moderate aortic stenosis of tissue valve  continue 40 mg lasix daily.

## 2023-10-02 ENCOUNTER — OFFICE VISIT (OUTPATIENT)
Dept: CARDIOLOGY CLINIC | Age: 88
End: 2023-10-02
Payer: MEDICARE

## 2023-10-02 VITALS
DIASTOLIC BLOOD PRESSURE: 80 MMHG | WEIGHT: 159.4 LBS | HEIGHT: 63 IN | OXYGEN SATURATION: 99 % | HEART RATE: 79 BPM | SYSTOLIC BLOOD PRESSURE: 152 MMHG | BODY MASS INDEX: 28.24 KG/M2

## 2023-10-02 DIAGNOSIS — I10 ESSENTIAL HYPERTENSION: ICD-10-CM

## 2023-10-02 DIAGNOSIS — Z95.2 S/P AVR (AORTIC VALVE REPLACEMENT): ICD-10-CM

## 2023-10-02 DIAGNOSIS — E78.5 HYPERLIPIDEMIA, UNSPECIFIED HYPERLIPIDEMIA TYPE: ICD-10-CM

## 2023-10-02 DIAGNOSIS — I25.119 CORONARY ARTERY DISEASE INVOLVING NATIVE CORONARY ARTERY OF NATIVE HEART WITH ANGINA PECTORIS (HCC): Primary | ICD-10-CM

## 2023-10-02 DIAGNOSIS — I48.0 PAF (PAROXYSMAL ATRIAL FIBRILLATION) (HCC): ICD-10-CM

## 2023-10-02 PROCEDURE — 99214 OFFICE O/P EST MOD 30 MIN: CPT | Performed by: INTERNAL MEDICINE

## 2023-10-02 PROCEDURE — 1123F ACP DISCUSS/DSCN MKR DOCD: CPT | Performed by: INTERNAL MEDICINE

## 2023-10-02 RX ORDER — ASPIRIN 81 MG/1
81 TABLET, CHEWABLE ORAL DAILY
Qty: 90 TABLET | Refills: 3 | Status: SHIPPED | OUTPATIENT
Start: 2023-10-02

## 2023-10-02 RX ORDER — LISINOPRIL 20 MG/1
20 TABLET ORAL 2 TIMES DAILY
Qty: 180 TABLET | Refills: 3 | Status: SHIPPED | OUTPATIENT
Start: 2023-10-02

## 2023-10-02 RX ORDER — FUROSEMIDE 20 MG/1
20 TABLET ORAL DAILY
Qty: 90 TABLET | Refills: 3 | Status: SHIPPED | OUTPATIENT
Start: 2023-10-02

## 2023-10-02 RX ORDER — PANTOPRAZOLE SODIUM 40 MG/1
TABLET, DELAYED RELEASE ORAL
Qty: 90 TABLET | Refills: 1 | Status: SHIPPED | OUTPATIENT
Start: 2023-10-02

## 2024-02-16 ENCOUNTER — TELEPHONE (OUTPATIENT)
Dept: CARDIOLOGY CLINIC | Age: 89
End: 2024-02-16

## 2024-02-16 NOTE — TELEPHONE ENCOUNTER
Anuja is currently in [place], [place] until the end of March. She states her dental procedure is not scheduled and will be done down there sometime in the next week or so. She has no pain at this time. She has h/o PAF, strokes, porcine AVR, allergy to PCN. Next OV 4/1/24.

## 2024-02-16 NOTE — TELEPHONE ENCOUNTER
Pt states she needs dental procedure tooth extraction and tooth implant. Asking if she can hold eliquis 4 days prior and if antibiotic is needed. Please call to advise.

## 2024-02-19 NOTE — TELEPHONE ENCOUNTER
Spoke to Anuja    Message as below per LES relayed to pt, pt verbalizes understanding.    She explains that she is not 100% sure if she is proceeding with procedure in [place] or if she'll wait and do it in [place] in March.  Nonetheless, she states that the dentist will order the antibiotic.

## 2024-04-01 ENCOUNTER — TELEPHONE (OUTPATIENT)
Dept: CARDIOLOGY CLINIC | Age: 89
End: 2024-04-01

## 2024-04-01 ENCOUNTER — OFFICE VISIT (OUTPATIENT)
Dept: CARDIOLOGY CLINIC | Age: 89
End: 2024-04-01
Payer: MEDICARE

## 2024-04-01 VITALS
OXYGEN SATURATION: 99 % | SYSTOLIC BLOOD PRESSURE: 142 MMHG | DIASTOLIC BLOOD PRESSURE: 90 MMHG | BODY MASS INDEX: 27.29 KG/M2 | HEART RATE: 67 BPM | WEIGHT: 154 LBS | HEIGHT: 63 IN

## 2024-04-01 DIAGNOSIS — I10 ESSENTIAL HYPERTENSION: ICD-10-CM

## 2024-04-01 DIAGNOSIS — R53.83 FATIGUE, UNSPECIFIED TYPE: ICD-10-CM

## 2024-04-01 DIAGNOSIS — I25.119 CORONARY ARTERY DISEASE INVOLVING NATIVE CORONARY ARTERY OF NATIVE HEART WITH ANGINA PECTORIS (HCC): Primary | Chronic | ICD-10-CM

## 2024-04-01 DIAGNOSIS — E78.5 HYPERLIPIDEMIA, UNSPECIFIED HYPERLIPIDEMIA TYPE: ICD-10-CM

## 2024-04-01 DIAGNOSIS — Z95.2 S/P AVR (AORTIC VALVE REPLACEMENT): ICD-10-CM

## 2024-04-01 PROCEDURE — 99214 OFFICE O/P EST MOD 30 MIN: CPT | Performed by: INTERNAL MEDICINE

## 2024-04-01 PROCEDURE — 1123F ACP DISCUSS/DSCN MKR DOCD: CPT | Performed by: INTERNAL MEDICINE

## 2024-04-01 RX ORDER — CLINDAMYCIN HYDROCHLORIDE 300 MG/1
300 CAPSULE ORAL 4 TIMES DAILY
Qty: 2 CAPSULE | Refills: 0 | Status: SHIPPED | OUTPATIENT
Start: 2024-04-01 | End: 2024-04-01 | Stop reason: SDUPTHER

## 2024-04-01 RX ORDER — CLINDAMYCIN HYDROCHLORIDE 300 MG/1
CAPSULE ORAL
Qty: 2 CAPSULE | Refills: 0 | Status: SHIPPED | OUTPATIENT
Start: 2024-04-01

## 2024-04-01 NOTE — PATIENT INSTRUCTIONS
Labs soon - CBC, CMP, Lipid Panel     Take antibiotics prior to any dental work - Clindamycin 300 mg - Take 1 tablet 1-2 hours prior to dental work and again 1-2 hours after dental work     Hold Aspirin 5 days prior to dental work   Hold Eliquis 2 days prior to dental work     Follow up with Dr. Boykin in 4 months with Echo same day

## 2024-04-01 NOTE — TELEPHONE ENCOUNTER
Madison called to get clarification on clindamycin.  Need clear direction .  Please call to clarify.  Thank you

## 2024-04-02 ENCOUNTER — TELEPHONE (OUTPATIENT)
Dept: CARDIOLOGY CLINIC | Age: 89
End: 2024-04-02

## 2024-04-02 NOTE — TELEPHONE ENCOUNTER
The patient phoned about the   clindamycin (CLEOCIN) 300 MG capsule [[id number]]  dosage  Take 1 tablet 1-2 hours prior to dental work/cleaning and then again 1-2 hours after dental work. She would like to discuss this is the correct dosage.  Please call and advise.  Thank you

## 2024-04-02 NOTE — TELEPHONE ENCOUNTER
Called Anuja, however no answer, left VM to call back > information in below message is what Anuja should follow regarding her clindamycin.

## 2024-04-21 ENCOUNTER — HOSPITAL ENCOUNTER (INPATIENT)
Age: 89
LOS: 4 days | Discharge: HOME | DRG: 392 | End: 2024-04-25
Attending: INTERNAL MEDICINE | Admitting: INTERNAL MEDICINE
Payer: MEDICARE

## 2024-04-21 PROBLEM — I16.1 HYPERTENSIVE EMERGENCY: Status: ACTIVE | Noted: 2024-04-21

## 2024-04-21 LAB — TROPONIN, HIGH SENSITIVITY: 15 NG/L (ref 0–14)

## 2024-04-21 PROCEDURE — 2000000000 HC ICU R&B

## 2024-04-21 PROCEDURE — 2580000003 HC RX 258: Performed by: INTERNAL MEDICINE

## 2024-04-21 PROCEDURE — 84484 ASSAY OF TROPONIN QUANT: CPT

## 2024-04-21 PROCEDURE — 1200000000 HC SEMI PRIVATE

## 2024-04-21 PROCEDURE — 6360000002 HC RX W HCPCS: Performed by: INTERNAL MEDICINE

## 2024-04-21 PROCEDURE — 6370000000 HC RX 637 (ALT 250 FOR IP): Performed by: INTERNAL MEDICINE

## 2024-04-21 RX ORDER — ASPIRIN 81 MG/1
81 TABLET, CHEWABLE ORAL DAILY
Status: DISCONTINUED | OUTPATIENT
Start: 2024-04-21 | End: 2024-04-25 | Stop reason: HOSPADM

## 2024-04-21 RX ORDER — AMLODIPINE BESYLATE 5 MG/1
10 TABLET ORAL DAILY
Status: DISCONTINUED | OUTPATIENT
Start: 2024-04-22 | End: 2024-04-25 | Stop reason: HOSPADM

## 2024-04-21 RX ORDER — ONDANSETRON 2 MG/ML
4 INJECTION INTRAMUSCULAR; INTRAVENOUS EVERY 6 HOURS PRN
Status: DISCONTINUED | OUTPATIENT
Start: 2024-04-21 | End: 2024-04-25 | Stop reason: HOSPADM

## 2024-04-21 RX ORDER — HYDRALAZINE HYDROCHLORIDE 20 MG/ML
5 INJECTION INTRAMUSCULAR; INTRAVENOUS EVERY 6 HOURS PRN
Status: DISCONTINUED | OUTPATIENT
Start: 2024-04-21 | End: 2024-04-25 | Stop reason: HOSPADM

## 2024-04-21 RX ORDER — POLYETHYLENE GLYCOL 3350 17 G/17G
17 POWDER, FOR SOLUTION ORAL DAILY PRN
Status: DISCONTINUED | OUTPATIENT
Start: 2024-04-21 | End: 2024-04-25 | Stop reason: HOSPADM

## 2024-04-21 RX ORDER — AMLODIPINE BESYLATE 5 MG/1
5 TABLET ORAL DAILY
Status: DISCONTINUED | OUTPATIENT
Start: 2024-04-21 | End: 2024-04-21

## 2024-04-21 RX ORDER — SODIUM CHLORIDE 0.9 % (FLUSH) 0.9 %
5-40 SYRINGE (ML) INJECTION EVERY 12 HOURS SCHEDULED
Status: DISCONTINUED | OUTPATIENT
Start: 2024-04-21 | End: 2024-04-25 | Stop reason: HOSPADM

## 2024-04-21 RX ORDER — ACETAMINOPHEN 650 MG/1
650 SUPPOSITORY RECTAL EVERY 6 HOURS PRN
Status: DISCONTINUED | OUTPATIENT
Start: 2024-04-21 | End: 2024-04-25 | Stop reason: HOSPADM

## 2024-04-21 RX ORDER — FUROSEMIDE 20 MG/1
20 TABLET ORAL DAILY
Status: DISCONTINUED | OUTPATIENT
Start: 2024-04-21 | End: 2024-04-25 | Stop reason: HOSPADM

## 2024-04-21 RX ORDER — PANTOPRAZOLE SODIUM 40 MG/1
40 TABLET, DELAYED RELEASE ORAL
Status: DISCONTINUED | OUTPATIENT
Start: 2024-04-21 | End: 2024-04-22

## 2024-04-21 RX ORDER — LISINOPRIL 20 MG/1
20 TABLET ORAL 2 TIMES DAILY
Status: DISCONTINUED | OUTPATIENT
Start: 2024-04-21 | End: 2024-04-25 | Stop reason: HOSPADM

## 2024-04-21 RX ORDER — ACETAMINOPHEN 325 MG/1
650 TABLET ORAL EVERY 6 HOURS PRN
Status: DISCONTINUED | OUTPATIENT
Start: 2024-04-21 | End: 2024-04-25 | Stop reason: HOSPADM

## 2024-04-21 RX ORDER — SODIUM CHLORIDE 0.9 % (FLUSH) 0.9 %
5-40 SYRINGE (ML) INJECTION PRN
Status: DISCONTINUED | OUTPATIENT
Start: 2024-04-21 | End: 2024-04-25 | Stop reason: HOSPADM

## 2024-04-21 RX ORDER — AMLODIPINE BESYLATE 5 MG/1
5 TABLET ORAL DAILY
Status: COMPLETED | OUTPATIENT
Start: 2024-04-21 | End: 2024-04-21

## 2024-04-21 RX ORDER — SODIUM CHLORIDE 9 MG/ML
INJECTION, SOLUTION INTRAVENOUS PRN
Status: DISCONTINUED | OUTPATIENT
Start: 2024-04-21 | End: 2024-04-25 | Stop reason: HOSPADM

## 2024-04-21 RX ADMIN — ONDANSETRON 4 MG: 2 INJECTION INTRAMUSCULAR; INTRAVENOUS at 21:24

## 2024-04-21 RX ADMIN — APIXABAN 2.5 MG: 2.5 TABLET, FILM COATED ORAL at 21:45

## 2024-04-21 RX ADMIN — ONDANSETRON 4 MG: 2 INJECTION INTRAMUSCULAR; INTRAVENOUS at 15:20

## 2024-04-21 RX ADMIN — ASPIRIN 81 MG 81 MG: 81 TABLET ORAL at 15:12

## 2024-04-21 RX ADMIN — FUROSEMIDE 20 MG: 20 TABLET ORAL at 15:12

## 2024-04-21 RX ADMIN — PANTOPRAZOLE SODIUM 40 MG: 40 TABLET, DELAYED RELEASE ORAL at 15:12

## 2024-04-21 RX ADMIN — HYDRALAZINE HYDROCHLORIDE 5 MG: 20 INJECTION, SOLUTION INTRAMUSCULAR; INTRAVENOUS at 15:01

## 2024-04-21 RX ADMIN — AMLODIPINE BESYLATE 5 MG: 5 TABLET ORAL at 15:12

## 2024-04-21 RX ADMIN — LISINOPRIL 20 MG: 20 TABLET ORAL at 21:40

## 2024-04-21 RX ADMIN — SODIUM CHLORIDE, PRESERVATIVE FREE 10 ML: 5 INJECTION INTRAVENOUS at 21:24

## 2024-04-21 RX ADMIN — ALUMINUM HYDROXIDE, MAGNESIUM HYDROXIDE, AND SIMETHICONE: 200; 200; 20 SUSPENSION ORAL at 14:58

## 2024-04-21 RX ADMIN — AMLODIPINE BESYLATE 5 MG: 5 TABLET ORAL at 21:40

## 2024-04-21 ASSESSMENT — PAIN SCALES - GENERAL
PAINLEVEL_OUTOF10: 2
PAINLEVEL_OUTOF10: 0

## 2024-04-21 NOTE — FLOWSHEET NOTE
04/21/24 1500   Vitals   Pulse 91   BP (!) 180/90   MAP (Calculated) 120   MAP (mmHg) 117       PRN hydralazine given

## 2024-04-21 NOTE — ACP (ADVANCE CARE PLANNING)
Advanced Care Planning Note.    Purpose of Encounter: Advanced care planning in light of hospitalization  Parties In Attendance: Patient,  elder  Decisional Capacity: Yes  Subjective: Patient  understand that this conversation is to address long term care goal  Objective: Patient admitted to the hospital with epigastric pain and hypertensive emergency  Goals of Care Determination: Patient would not pursue CPR and Intubation if required.  Code Status: DNR CCA DNI  Time spent on Advanced care Plannin minutes  Advanced Care Planning Documents: documented patient's wishes, would like Elder Hernández and Eveline to make medical decisions if unable to make decisions    Carlos Eduardo Godfrey MD  2024 2:38 PM

## 2024-04-21 NOTE — H&P
HOSPITALISTS HISTORY AND PHYSICAL    4/21/2024 2:35 PM    Patient Information:  ANUJA VALLEJO is a 88 y.o. female [id number]  PCP:  Caesar Carlton DO (Tel: [phone] )    Chief complaint:  epigastric pain    History of Present Illness:  Anuja Vallejo is a 88 y.o. female COVID 330 this morning with epigastric pain with nausea some shortness of breath lightheadedness and diaphoresis.  EMS was called and patient blood pressure was 230 systolic patient was seen in the ED at outside hospital given nitroglycerin and patient blood pressure dropped to 100 systolic and patient had syncopal episode.  Patient blood pressure rebounded to 200 systolic, started nicardipine drip and transferred to [facility] patient does not smoke or drink is on Eliquis for A-fib has a history of prosthetic aortic valve no prior history of cardiac stents denies any fevers chills cough sick contacts      REVIEW OF SYSTEMS:   Constitutional: Negative for fever,chills or night sweats  ENT: Negative for rhinorrhea, epistaxis, hoarseness, sore throat.    Genitourinary: Negative for polyuria, dysuria   Hematologic/Lymphatic: Negative for bleeding tendency, easy bruising  Musculoskeletal: Negative for myalgias and arthralgias  Neurologic: Negative for confusion,dysarthria.  Skin: Negative for itching,rash  Psychiatric: Negative for depression,anxiety, agitation.  Endocrine: Negative for polydipsia,polyuria,heat /cold intolerance.    Past Medical History:   has a past medical history of Aortic valve disease, CAD (coronary artery disease), CVA (cerebral infarction), and Hypertension.     Past Surgical History:   has a past surgical history that includes Coronary angioplasty with stent (12/22/2004); Total knee arthroplasty (Right, 7/15/2013); Cataract removal with implant (Left, 10/9/2015); Cardiac catheterization (11/19/2015); Aortic valve

## 2024-04-22 ENCOUNTER — APPOINTMENT (OUTPATIENT)
Dept: CT IMAGING | Age: 89
DRG: 392 | End: 2024-04-22
Attending: INTERNAL MEDICINE
Payer: MEDICARE

## 2024-04-22 ENCOUNTER — APPOINTMENT (OUTPATIENT)
Dept: GENERAL RADIOLOGY | Age: 89
DRG: 392 | End: 2024-04-22
Attending: INTERNAL MEDICINE
Payer: MEDICARE

## 2024-04-22 PROBLEM — K56.609 SMALL BOWEL OBSTRUCTION (HCC): Status: ACTIVE | Noted: 2024-04-22

## 2024-04-22 PROBLEM — K56.609 SBO (SMALL BOWEL OBSTRUCTION) (HCC): Status: ACTIVE | Noted: 2024-04-22

## 2024-04-22 LAB
ANION GAP SERPL CALCULATED.3IONS-SCNC: 14 MMOL/L (ref 3–16)
BASOPHILS # BLD: 0 K/UL (ref 0–0.2)
BASOPHILS NFR BLD: 0.2 %
BUN SERPL-MCNC: 19 MG/DL (ref 7–20)
CALCIUM SERPL-MCNC: 9.7 MG/DL (ref 8.3–10.6)
CHLORIDE SERPL-SCNC: 99 MMOL/L (ref 99–110)
CO2 SERPL-SCNC: 22 MMOL/L (ref 21–32)
CREAT SERPL-MCNC: 0.7 MG/DL (ref 0.6–1.2)
DEPRECATED RDW RBC AUTO: 15.8 % (ref 12.4–15.4)
EOSINOPHIL # BLD: 0 K/UL (ref 0–0.6)
EOSINOPHIL NFR BLD: 0 %
GFR SERPLBLD CREATININE-BSD FMLA CKD-EPI: 83 ML/MIN/{1.73_M2}
GLUCOSE BLD-MCNC: 115 MG/DL (ref 70–99)
GLUCOSE SERPL-MCNC: 129 MG/DL (ref 70–99)
HCT VFR BLD AUTO: 44.8 % (ref 36–48)
HGB BLD-MCNC: 14.7 G/DL (ref 12–16)
LYMPHOCYTES # BLD: 0.5 K/UL (ref 1–5.1)
LYMPHOCYTES NFR BLD: 6.5 %
MCH RBC QN AUTO: 30.1 PG (ref 26–34)
MCHC RBC AUTO-ENTMCNC: 32.7 G/DL (ref 31–36)
MCV RBC AUTO: 91.8 FL (ref 80–100)
MONOCYTES # BLD: 0.7 K/UL (ref 0–1.3)
MONOCYTES NFR BLD: 8.5 %
NEUTROPHILS # BLD: 7.2 K/UL (ref 1.7–7.7)
NEUTROPHILS NFR BLD: 84.8 %
PERFORMED ON: ABNORMAL
PLATELET # BLD AUTO: 215 K/UL (ref 135–450)
PMV BLD AUTO: 10.8 FL (ref 5–10.5)
POTASSIUM SERPL-SCNC: 4.8 MMOL/L (ref 3.5–5.1)
RBC # BLD AUTO: 4.88 M/UL (ref 4–5.2)
SODIUM SERPL-SCNC: 135 MMOL/L (ref 136–145)
TROPONIN, HIGH SENSITIVITY: 22 NG/L (ref 0–14)
WBC # BLD AUTO: 8.5 K/UL (ref 4–11)

## 2024-04-22 PROCEDURE — 84484 ASSAY OF TROPONIN QUANT: CPT

## 2024-04-22 PROCEDURE — 6370000000 HC RX 637 (ALT 250 FOR IP): Performed by: INTERNAL MEDICINE

## 2024-04-22 PROCEDURE — 6360000002 HC RX W HCPCS: Performed by: NURSE PRACTITIONER

## 2024-04-22 PROCEDURE — 71260 CT THORAX DX C+: CPT

## 2024-04-22 PROCEDURE — 97535 SELF CARE MNGMENT TRAINING: CPT

## 2024-04-22 PROCEDURE — 1200000000 HC SEMI PRIVATE

## 2024-04-22 PROCEDURE — 6360000004 HC RX CONTRAST MEDICATION: Performed by: INTERNAL MEDICINE

## 2024-04-22 PROCEDURE — 97530 THERAPEUTIC ACTIVITIES: CPT

## 2024-04-22 PROCEDURE — 85025 COMPLETE CBC W/AUTO DIFF WBC: CPT

## 2024-04-22 PROCEDURE — 36415 COLL VENOUS BLD VENIPUNCTURE: CPT

## 2024-04-22 PROCEDURE — 6370000000 HC RX 637 (ALT 250 FOR IP): Performed by: NURSE PRACTITIONER

## 2024-04-22 PROCEDURE — 99222 1ST HOSP IP/OBS MODERATE 55: CPT | Performed by: SURGERY

## 2024-04-22 PROCEDURE — 97116 GAIT TRAINING THERAPY: CPT

## 2024-04-22 PROCEDURE — 80048 BASIC METABOLIC PNL TOTAL CA: CPT

## 2024-04-22 PROCEDURE — 99222 1ST HOSP IP/OBS MODERATE 55: CPT | Performed by: INTERNAL MEDICINE

## 2024-04-22 PROCEDURE — 94760 N-INVAS EAR/PLS OXIMETRY 1: CPT

## 2024-04-22 PROCEDURE — 6360000004 HC RX CONTRAST MEDICATION: Performed by: PHYSICIAN ASSISTANT

## 2024-04-22 PROCEDURE — 97161 PT EVAL LOW COMPLEX 20 MIN: CPT

## 2024-04-22 PROCEDURE — 97165 OT EVAL LOW COMPLEX 30 MIN: CPT

## 2024-04-22 PROCEDURE — 6360000002 HC RX W HCPCS: Performed by: INTERNAL MEDICINE

## 2024-04-22 PROCEDURE — APPNB30 APP NON BILLABLE TIME 0-30 MINS: Performed by: NURSE PRACTITIONER

## 2024-04-22 PROCEDURE — 2580000003 HC RX 258: Performed by: INTERNAL MEDICINE

## 2024-04-22 PROCEDURE — 6360000002 HC RX W HCPCS: Performed by: PHYSICIAN ASSISTANT

## 2024-04-22 PROCEDURE — 74018 RADEX ABDOMEN 1 VIEW: CPT

## 2024-04-22 PROCEDURE — C9113 INJ PANTOPRAZOLE SODIUM, VIA: HCPCS | Performed by: PHYSICIAN ASSISTANT

## 2024-04-22 RX ORDER — PANTOPRAZOLE SODIUM 40 MG/10ML
40 INJECTION, POWDER, LYOPHILIZED, FOR SOLUTION INTRAVENOUS 2 TIMES DAILY
Status: DISCONTINUED | OUTPATIENT
Start: 2024-04-22 | End: 2024-04-25 | Stop reason: HOSPADM

## 2024-04-22 RX ORDER — LIDOCAINE HYDROCHLORIDE 20 MG/ML
JELLY TOPICAL ONCE
Status: COMPLETED | OUTPATIENT
Start: 2024-04-22 | End: 2024-04-22

## 2024-04-22 RX ORDER — MORPHINE SULFATE 2 MG/ML
2 INJECTION, SOLUTION INTRAMUSCULAR; INTRAVENOUS EVERY 4 HOURS PRN
Status: DISCONTINUED | OUTPATIENT
Start: 2024-04-22 | End: 2024-04-25 | Stop reason: HOSPADM

## 2024-04-22 RX ORDER — OXYMETAZOLINE HYDROCHLORIDE 0.05 G/100ML
2 SPRAY NASAL ONCE
Status: COMPLETED | OUTPATIENT
Start: 2024-04-22 | End: 2024-04-22

## 2024-04-22 RX ADMIN — MORPHINE SULFATE 2 MG: 2 INJECTION, SOLUTION INTRAMUSCULAR; INTRAVENOUS at 23:03

## 2024-04-22 RX ADMIN — SODIUM CHLORIDE, PRESERVATIVE FREE 10 ML: 5 INJECTION INTRAVENOUS at 20:55

## 2024-04-22 RX ADMIN — NASAL DECONGESTANT 2 SPRAY: 0.05 SPRAY NASAL at 15:16

## 2024-04-22 RX ADMIN — LISINOPRIL 20 MG: 20 TABLET ORAL at 20:55

## 2024-04-22 RX ADMIN — DIATRIZOATE MEGLUMINE AND DIATRIZOATE SODIUM 20 ML: 660; 100 LIQUID ORAL; RECTAL at 09:56

## 2024-04-22 RX ADMIN — PANTOPRAZOLE SODIUM 40 MG: 40 INJECTION, POWDER, FOR SOLUTION INTRAVENOUS at 20:55

## 2024-04-22 RX ADMIN — AMLODIPINE BESYLATE 10 MG: 5 TABLET ORAL at 09:13

## 2024-04-22 RX ADMIN — ASPIRIN 81 MG 81 MG: 81 TABLET ORAL at 09:13

## 2024-04-22 RX ADMIN — Medication 1 SPRAY: at 19:01

## 2024-04-22 RX ADMIN — LIDOCAINE HYDROCHLORIDE: 20 JELLY TOPICAL at 15:17

## 2024-04-22 RX ADMIN — SODIUM CHLORIDE, PRESERVATIVE FREE 10 ML: 5 INJECTION INTRAVENOUS at 09:14

## 2024-04-22 RX ADMIN — LISINOPRIL 20 MG: 20 TABLET ORAL at 09:13

## 2024-04-22 RX ADMIN — IOPAMIDOL 75 ML: 755 INJECTION, SOLUTION INTRAVENOUS at 10:49

## 2024-04-22 RX ADMIN — PANTOPRAZOLE SODIUM 40 MG: 40 INJECTION, POWDER, FOR SOLUTION INTRAVENOUS at 09:21

## 2024-04-22 RX ADMIN — PANTOPRAZOLE SODIUM 40 MG: 40 TABLET, DELAYED RELEASE ORAL at 05:43

## 2024-04-22 RX ADMIN — MORPHINE SULFATE 2 MG: 2 INJECTION, SOLUTION INTRAMUSCULAR; INTRAVENOUS at 19:02

## 2024-04-22 RX ADMIN — ONDANSETRON 4 MG: 2 INJECTION INTRAMUSCULAR; INTRAVENOUS at 09:14

## 2024-04-22 RX ADMIN — FUROSEMIDE 20 MG: 20 TABLET ORAL at 09:13

## 2024-04-22 ASSESSMENT — PAIN SCALES - GENERAL
PAINLEVEL_OUTOF10: 0
PAINLEVEL_OUTOF10: 7
PAINLEVEL_OUTOF10: 0
PAINLEVEL_OUTOF10: 1
PAINLEVEL_OUTOF10: 0
PAINLEVEL_OUTOF10: 0

## 2024-04-22 ASSESSMENT — PAIN DESCRIPTION - LOCATION: LOCATION: ABDOMEN

## 2024-04-22 ASSESSMENT — PAIN DESCRIPTION - ORIENTATION: ORIENTATION: MID

## 2024-04-22 ASSESSMENT — PAIN DESCRIPTION - DESCRIPTORS: DESCRIPTORS: CRAMPING

## 2024-04-22 NOTE — CONSULTS
[facility]  H+P  Consult  OP Visit  FU Visit   CC/HPI   CC New patient visit for abdominal pain.   HPI 88 y.o., female  presents with abdominal pain.  Reports moderate pain in central abdomen for last 24 hours.  Pain did not radiate into chest, down arm or into neck.  Does not feel like cardiac pain.  No sob.  Trop 15.  EKG unavailable.  BP markedly elevated.    Cardiac Hx SAVR   Troponin Lab Results   Component Value Date    TROPHS 15 (H) 04/21/2024    TROPHS 29 (H) 08/03/2023    TROPHS 25 (H) 08/03/2023      HISTORY/ALLERGY/ROS   MEDHx  has a past medical history of Aortic valve disease, CAD (coronary artery disease), CVA (cerebral infarction), and Hypertension.   SURGHx  has a past surgical history that includes Coronary angioplasty with stent (12/22/2004); Total knee arthroplasty (Right, 7/15/2013); Cataract removal with implant (Left, 10/9/2015); Cardiac catheterization (11/19/2015); Aortic valve replacement (12/4/2015); Upper gastrointestinal endoscopy (N/A, 5/8/2020); and Colonoscopy (N/A, 5/8/2020).   SOCHx  reports that she has never smoked. She has never used smokeless tobacco. She reports that she does not drink alcohol and does not use drugs.   FAMHx family history includes Heart Disease in her father.   ALLERG Lipitor and Penicillins   ROS Full ROS obtained and negative except as mentioned in HPI   MEDICATIONS   Medications reviewed in Epic.   PHYSICAL EXAM   Vitals BP (!) 167/83   Pulse (!) 104   Temp 98.2 °F (36.8 °C) (Oral)   Resp 18   Ht 1.6 m (5' 3\")   Wt 69.9 kg (154 lb 1.6 oz)   SpO2 95%   BMI 27.30 kg/m²    Gen Alert, coop, no distress Heart  RRR, 1/6   Head NC, AT, no abnorm Abd  Soft, NT, +BS, no mass, no OM   Eyes PER, conj/corn clear Ext  Ext nl, AT, no C/C/E   Nose Nares nl, no drain, NT Pulse 2+ and symmetric   Throat Lips, mucosa, tongue nl Skin Col/text/turg nl, no vis rash/les   Neck S/S, TM, NT, no bruit/JVD Psych Nl mood and affect   Lung CTA-B, unlabored, no DTP 
Await cardiology input.  Consult surgery, I suspect patient would need hiatal hernia repair.  Depending on clinical progress, we will be available for EGD.    Alex Barreto MD, MSc  [facility]  04/22/24      
TECHNOLOGIST PROVIDED HISTORY: Reason for exam:->substernal chest pain n/v, known large hiatal hernia. eval hiatal hernia Additional Contrast?->Oral Reason for Exam: substernal chest pain n/v, known large hiatal hernia. eval hiatal hernia CHEST: Lower neck: No lymphadenopathy. Medical devices: None. Heart: No cardiomegaly. A significant burden of coronary artery calcifications are present. No pericardial fluid is present. Patient is status post aortic valve replacement. Vascular Structures: A normal three vessel aortic arch is present. The aorta is normal in caliber. The main pulmonary artery is normal in size. Mediastinum: No suspicious lymphadenopathy is present. A large hiatal hernia is present with the majority of the stomach within the posterior mediastinum. Central airways: The central airways are clear. Lungs: The lungs are clear. No suspicious lung nodules are observed. Pleura: No pneumothorax, pleural effusion, or pleural thickening is evident. Soft Tissues/Bones: No soft tissue abnormalities are evident. No acute osseous abnormality or suspicious osseous lesion is identified.  Kyphoplasty cement noted at L1. ABDOMEN/PELVIS: Liver: No suspicious hepatic lesions are present. Biliary system/Gallbladder: No intrahepatic or extrahepatic biliary ductal dilation. The gallbladder is normal. Spleen: Scattered splenic calcifications are present, consistent with prior granulomatous disease. Pancreas: No evidence of pancreatic lesions or pancreatic ductal dilation. Adrenals: No suspicious adrenal nodules are present. Kidneys/Bladder: No evidence of hydronephrosis, nephrolithiasis, or suspicious mass lesion. The urinary bladder is normal. Pelvic organs: The uterus is surgically absent. No suspicious adnexal masses are evident. GI tract: The intra-abdominal stomach is within normal limits.  Dilated and fluid-filled small bowel is present within the abdomen with a focal change in caliber noted within the central pelvis

## 2024-04-22 NOTE — PLAN OF CARE
Problem: Pain  Goal: Verbalizes/displays adequate comfort level or baseline comfort level  Outcome: Progressing     Problem: Safety - Adult  Goal: Free from fall injury  Outcome: Progressing     Problem: ABCDS Injury Assessment  Goal: Absence of physical injury  Outcome: Progressing     Problem: Chronic Conditions and Co-morbidities  Goal: Patient's chronic conditions and co-morbidity symptoms are monitored and maintained or improved  Outcome: Progressing     Problem: Discharge Planning  Goal: Discharge to home or other facility with appropriate resources  Outcome: Progressing

## 2024-04-22 NOTE — PLAN OF CARE
Problem: Pain  Goal: Verbalizes/displays adequate comfort level or baseline comfort level  4/22/2024 1105 by Jeannie Ortiz RN  Outcome: Progressing  4/22/2024 0108 by Vinita Phillips RN  Outcome: Progressing     Problem: Safety - Adult  Goal: Free from fall injury  4/22/2024 1105 by Jeannie Ortiz RN  Outcome: Progressing  4/22/2024 0108 by Vinita Phillips RN  Outcome: Progressing     Problem: ABCDS Injury Assessment  Goal: Absence of physical injury  4/22/2024 1105 by Jeannie Ortiz RN  Outcome: Progressing  4/22/2024 0108 by Vinita Phillips RN  Outcome: Progressing     Problem: Chronic Conditions and Co-morbidities  Goal: Patient's chronic conditions and co-morbidity symptoms are monitored and maintained or improved  4/22/2024 1105 by Jeannie Ortiz RN  Outcome: Progressing  4/22/2024 0108 by Vinita Phillips RN  Outcome: Progressing

## 2024-04-22 NOTE — CARE COORDINATION
Case Management Assessment  Initial Evaluation    Date/Time of Evaluation: 4/22/2024 11:19 AM  Assessment Completed by: La Nena Torre    If patient is discharged prior to next notation, then this note serves as note for discharge by case management.    Patient Name: Anuja Eid                   YOB: 1935  Diagnosis: Hypertensive emergency [I16.1]                   Date / Time: 4/21/2024  1:42 PM    Patient Admission Status: Inpatient   Readmission Risk (Low < 19, Mod (19-27), High > 27): Readmission Risk Score: 12.5    Current PCP: Caesar Carlton, DO  PCP verified by CM? (P) Yes    Chart Reviewed: Yes      History Provided by: (P) Patient  Patient Orientation: (P) Alert and Oriented, Person, Place    Patient Cognition: (P) Alert    Hospitalization in the last 30 days (Readmission):  No    If yes, Readmission Assessment in  Navigator will be completed.    Advance Directives:      Code Status: DNR-CCA   Patient's Primary Decision Maker is: (P) Legal Next of Kin      Discharge Planning:    Patient lives with: (P) Alone Type of Home: (P) House  Primary Care Giver: (P) Self  Patient Support Systems include: (P) Family Members   Current Financial resources: (P) Medicare  Current community resources: (P) None  Current services prior to admission: (P) Durable Medical Equipment            Current DME: (P) Cane (Rollator)            Type of Home Care services:  (P) None    ADLS  Prior functional level: (P) Assistance with the following:, Mobility, Cooking, Housework, Shopping  Current functional level: (P) Assistance with the following:, Mobility, Cooking, Housework, Shopping    PT AM-PAC:   /24  OT AM-PAC:   /24    Family can provide assistance at DC: (P) Yes  Would you like Case Management to discuss the discharge plan with any other family members/significant others, and if so, who? (P) Yes  Plans to Return to Present Housing: (P) Yes  Other Identified Issues/Barriers to RETURNING to

## 2024-04-23 ENCOUNTER — APPOINTMENT (OUTPATIENT)
Dept: GENERAL RADIOLOGY | Age: 89
DRG: 392 | End: 2024-04-23
Attending: INTERNAL MEDICINE
Payer: MEDICARE

## 2024-04-23 PROBLEM — R10.13 EPIGASTRIC PAIN: Status: ACTIVE | Noted: 2024-04-23

## 2024-04-23 PROBLEM — K44.9 HH (HIATUS HERNIA): Status: ACTIVE | Noted: 2024-04-23

## 2024-04-23 LAB
ANION GAP SERPL CALCULATED.3IONS-SCNC: 9 MMOL/L (ref 3–16)
BASOPHILS # BLD: 0 K/UL (ref 0–0.2)
BASOPHILS NFR BLD: 0.2 %
BUN SERPL-MCNC: 30 MG/DL (ref 7–20)
CALCIUM SERPL-MCNC: 9.2 MG/DL (ref 8.3–10.6)
CHLORIDE SERPL-SCNC: 101 MMOL/L (ref 99–110)
CO2 SERPL-SCNC: 26 MMOL/L (ref 21–32)
CREAT SERPL-MCNC: 0.9 MG/DL (ref 0.6–1.2)
DEPRECATED RDW RBC AUTO: 15.8 % (ref 12.4–15.4)
EOSINOPHIL # BLD: 0 K/UL (ref 0–0.6)
EOSINOPHIL NFR BLD: 0.7 %
GFR SERPLBLD CREATININE-BSD FMLA CKD-EPI: 61 ML/MIN/{1.73_M2}
GLUCOSE SERPL-MCNC: 96 MG/DL (ref 70–99)
HCT VFR BLD AUTO: 38.5 % (ref 36–48)
HGB BLD-MCNC: 12.8 G/DL (ref 12–16)
LYMPHOCYTES # BLD: 0.8 K/UL (ref 1–5.1)
LYMPHOCYTES NFR BLD: 16.6 %
MCH RBC QN AUTO: 30.5 PG (ref 26–34)
MCHC RBC AUTO-ENTMCNC: 33.4 G/DL (ref 31–36)
MCV RBC AUTO: 91.3 FL (ref 80–100)
MONOCYTES # BLD: 0.6 K/UL (ref 0–1.3)
MONOCYTES NFR BLD: 12.7 %
NEUTROPHILS # BLD: 3.5 K/UL (ref 1.7–7.7)
NEUTROPHILS NFR BLD: 69.8 %
PLATELET # BLD AUTO: 196 K/UL (ref 135–450)
PMV BLD AUTO: 10.3 FL (ref 5–10.5)
POTASSIUM SERPL-SCNC: 4.4 MMOL/L (ref 3.5–5.1)
RBC # BLD AUTO: 4.21 M/UL (ref 4–5.2)
SODIUM SERPL-SCNC: 136 MMOL/L (ref 136–145)
WBC # BLD AUTO: 5.1 K/UL (ref 4–11)

## 2024-04-23 PROCEDURE — APPSS15 APP SPLIT SHARED TIME 0-15 MINUTES: Performed by: NURSE PRACTITIONER

## 2024-04-23 PROCEDURE — 6370000000 HC RX 637 (ALT 250 FOR IP): Performed by: INTERNAL MEDICINE

## 2024-04-23 PROCEDURE — 97530 THERAPEUTIC ACTIVITIES: CPT

## 2024-04-23 PROCEDURE — 36415 COLL VENOUS BLD VENIPUNCTURE: CPT

## 2024-04-23 PROCEDURE — 99232 SBSQ HOSP IP/OBS MODERATE 35: CPT | Performed by: SURGERY

## 2024-04-23 PROCEDURE — 6360000002 HC RX W HCPCS: Performed by: NURSE PRACTITIONER

## 2024-04-23 PROCEDURE — 99232 SBSQ HOSP IP/OBS MODERATE 35: CPT | Performed by: INTERNAL MEDICINE

## 2024-04-23 PROCEDURE — 93306 TTE W/DOPPLER COMPLETE: CPT

## 2024-04-23 PROCEDURE — 2580000003 HC RX 258: Performed by: INTERNAL MEDICINE

## 2024-04-23 PROCEDURE — 74240 X-RAY XM UPR GI TRC 1CNTRST: CPT

## 2024-04-23 PROCEDURE — 85025 COMPLETE CBC W/AUTO DIFF WBC: CPT

## 2024-04-23 PROCEDURE — APPNB30 APP NON BILLABLE TIME 0-30 MINS: Performed by: NURSE PRACTITIONER

## 2024-04-23 PROCEDURE — 6360000002 HC RX W HCPCS: Performed by: PHYSICIAN ASSISTANT

## 2024-04-23 PROCEDURE — 6360000004 HC RX CONTRAST MEDICATION

## 2024-04-23 PROCEDURE — 97535 SELF CARE MNGMENT TRAINING: CPT

## 2024-04-23 PROCEDURE — C9113 INJ PANTOPRAZOLE SODIUM, VIA: HCPCS | Performed by: PHYSICIAN ASSISTANT

## 2024-04-23 PROCEDURE — 1200000000 HC SEMI PRIVATE

## 2024-04-23 PROCEDURE — 80048 BASIC METABOLIC PNL TOTAL CA: CPT

## 2024-04-23 PROCEDURE — 74019 RADEX ABDOMEN 2 VIEWS: CPT

## 2024-04-23 RX ADMIN — LISINOPRIL 20 MG: 20 TABLET ORAL at 20:48

## 2024-04-23 RX ADMIN — ASPIRIN 81 MG 81 MG: 81 TABLET ORAL at 08:17

## 2024-04-23 RX ADMIN — MORPHINE SULFATE 2 MG: 2 INJECTION, SOLUTION INTRAMUSCULAR; INTRAVENOUS at 03:33

## 2024-04-23 RX ADMIN — SODIUM CHLORIDE, PRESERVATIVE FREE 10 ML: 5 INJECTION INTRAVENOUS at 08:18

## 2024-04-23 RX ADMIN — LISINOPRIL 20 MG: 20 TABLET ORAL at 08:17

## 2024-04-23 RX ADMIN — IOHEXOL 200 ML: 350 INJECTION, SOLUTION INTRAVENOUS at 12:21

## 2024-04-23 RX ADMIN — AMLODIPINE BESYLATE 10 MG: 5 TABLET ORAL at 08:17

## 2024-04-23 RX ADMIN — SODIUM CHLORIDE, PRESERVATIVE FREE 10 ML: 5 INJECTION INTRAVENOUS at 20:48

## 2024-04-23 RX ADMIN — PANTOPRAZOLE SODIUM 40 MG: 40 INJECTION, POWDER, FOR SOLUTION INTRAVENOUS at 20:48

## 2024-04-23 RX ADMIN — PANTOPRAZOLE SODIUM 40 MG: 40 INJECTION, POWDER, FOR SOLUTION INTRAVENOUS at 08:17

## 2024-04-23 ASSESSMENT — PAIN SCALES - GENERAL: PAINLEVEL_OUTOF10: 0

## 2024-04-23 NOTE — CARE COORDINATION
CARLY called Betty Catalan's dtr. To inquire name of [facility] company. Betty will find out and return call to CM   CM provided contact number.   Electronically signed by La Nena Torre on 4/23/2024 at 10:26 AM

## 2024-04-23 NOTE — PLAN OF CARE
Problem: Pain  Goal: Verbalizes/displays adequate comfort level or baseline comfort level  4/22/2024 2127 by Jennifer Howard, RN  Outcome: Progressing       Problem: Safety - Adult  Goal: Free from fall injury  4/22/2024 2127 by Jennifer Howard RN  Outcome: Progressing       Problem: ABCDS Injury Assessment  Goal: Absence of physical injury  4/22/2024 2127 by Jennifer Howard RN  Outcome: Progressing       Problem: Chronic Conditions and Co-morbidities  Goal: Patient's chronic conditions and co-morbidity symptoms are monitored and maintained or improved  4/22/2024 2127 by Jennifer Howard RN  Outcome: Progressing    Problem: Discharge Planning  Goal: Discharge to home or other facility with appropriate resources  Outcome: Progressing

## 2024-04-23 NOTE — DISCHARGE INSTR - COC
Continuity of Care Form    Patient Name: Anuja Eid   :  1935  MRN:  [id number]    Admit date:  2024  Discharge date:  ***    Code Status Order: DNR-CCA   Advance Directives:     Admitting Physician:  Carlos Eduardo Godfrey MD  PCP: Caesar Carlton DO    Discharging Nurse: ***  Discharging Hospital Unit/Room#: 3AN-3316/3316-01  Discharging Unit Phone Number: ***    Emergency Contact:   Extended Emergency Contact Information  Primary Emergency Contact: Betty Taylor  Home Phone: [phone]  Relation: Child  Secondary Emergency Contact: Eveline Rivera  Mobile Phone: [phone]  Relation: Child    Past Surgical History:  Past Surgical History:   Procedure Laterality Date    AORTIC VALVE REPLACEMENT  2015    AVR porcine by DR. Waller    CARDIAC CATHETERIZATION  2015    Dr. Jones - no flow restrictive stenoses in coronary arteries    CATARACT REMOVAL WITH IMPLANT Left 10/9/2015    COLONOSCOPY N/A 2020    COLONOSCOPY POLYPECTOMY SNARE/COLD BIOPSY performed by Christopher Zhang MD at [facility] ENDOSCOPY    CORONARY ANGIOPLASTY WITH STENT PLACEMENT  2004    TOTAL KNEE ARTHROPLASTY Right 7/15/2013    UPPER GASTROINTESTINAL ENDOSCOPY N/A 2020    EGD BIOPSY performed by Christopher Zhang MD at [facility] ENDOSCOPY       Immunization History:     There is no immunization history on file for this patient.    Active Problems:  Patient Active Problem List   Diagnosis Code    Essential hypertension I10    Cerebral infarction ([facility]) I63.9    Adult body mass index greater than 30 [id number]    Obstructive sleep apnea syndrome G47.33    Osteoarthritis of knee M17.9    S/P AVR (aortic valve replacement) Z95.2    Nonrheumatic aortic valve stenosis I35.0    Pneumothorax J93.9    Coronary artery disease involving native coronary artery of native heart with angina pectoris ([facility]) I25.119    PAF (paroxysmal atrial fibrillation) ([facility]) I48.0    Cerebrovascular accident (CVA) ([facility]) I63.9    Cardiac arrhythmia

## 2024-04-23 NOTE — CARE COORDINATION
Discharge Planning Note Re: Home Health Care     CM/SW noted consult for discharge planning. Chart reviewed. Noted recommendations for home health care.  CM met with patient and Betty. Introduced self and explained role of CM and discharge planning.    Patient is agreeable to home health on dc.     Pt daughter report previously active with [facility], Pt [facility] able to accept.     Referral made to [facility].   Pending [facility] order for  [x]RN []PT  [x]OT  []HHA  []SW  []  SLP    CM/SW will follow-up on referrals and provide any additional documentation necessary to facilitate placement.      Electronically signed by La Nena Torre on 4/23/2024 at 1:16 PM

## 2024-04-24 PROBLEM — K56.600 PARTIAL SMALL BOWEL OBSTRUCTION (HCC): Status: ACTIVE | Noted: 2024-04-22

## 2024-04-24 PROBLEM — K31.89 ORGANOAXIAL GASTRIC VOLVULUS: Status: ACTIVE | Noted: 2024-04-24

## 2024-04-24 LAB
ANION GAP SERPL CALCULATED.3IONS-SCNC: 11 MMOL/L (ref 3–16)
BASOPHILS # BLD: 0 K/UL (ref 0–0.2)
BASOPHILS NFR BLD: 0.3 %
BUN SERPL-MCNC: 32 MG/DL (ref 7–20)
CALCIUM SERPL-MCNC: 9 MG/DL (ref 8.3–10.6)
CHLORIDE SERPL-SCNC: 99 MMOL/L (ref 99–110)
CO2 SERPL-SCNC: 23 MMOL/L (ref 21–32)
CREAT SERPL-MCNC: 0.7 MG/DL (ref 0.6–1.2)
DEPRECATED RDW RBC AUTO: 16 % (ref 12.4–15.4)
EOSINOPHIL # BLD: 0.1 K/UL (ref 0–0.6)
EOSINOPHIL NFR BLD: 1.8 %
GFR SERPLBLD CREATININE-BSD FMLA CKD-EPI: 83 ML/MIN/{1.73_M2}
GLUCOSE SERPL-MCNC: 93 MG/DL (ref 70–99)
HCT VFR BLD AUTO: 35.9 % (ref 36–48)
HGB BLD-MCNC: 11.7 G/DL (ref 12–16)
LYMPHOCYTES # BLD: 0.7 K/UL (ref 1–5.1)
LYMPHOCYTES NFR BLD: 19.5 %
MCH RBC QN AUTO: 30 PG (ref 26–34)
MCHC RBC AUTO-ENTMCNC: 32.7 G/DL (ref 31–36)
MCV RBC AUTO: 91.7 FL (ref 80–100)
MONOCYTES # BLD: 0.5 K/UL (ref 0–1.3)
MONOCYTES NFR BLD: 14.3 %
NEUTROPHILS # BLD: 2.5 K/UL (ref 1.7–7.7)
NEUTROPHILS NFR BLD: 64.1 %
PLATELET # BLD AUTO: 166 K/UL (ref 135–450)
PMV BLD AUTO: 10.1 FL (ref 5–10.5)
POTASSIUM SERPL-SCNC: 4 MMOL/L (ref 3.5–5.1)
RBC # BLD AUTO: 3.91 M/UL (ref 4–5.2)
SODIUM SERPL-SCNC: 133 MMOL/L (ref 136–145)
WBC # BLD AUTO: 3.8 K/UL (ref 4–11)

## 2024-04-24 PROCEDURE — 6360000002 HC RX W HCPCS: Performed by: PHYSICIAN ASSISTANT

## 2024-04-24 PROCEDURE — 97530 THERAPEUTIC ACTIVITIES: CPT

## 2024-04-24 PROCEDURE — APPNB30 APP NON BILLABLE TIME 0-30 MINS: Performed by: NURSE PRACTITIONER

## 2024-04-24 PROCEDURE — 2580000003 HC RX 258: Performed by: INTERNAL MEDICINE

## 2024-04-24 PROCEDURE — APPSS15 APP SPLIT SHARED TIME 0-15 MINUTES: Performed by: NURSE PRACTITIONER

## 2024-04-24 PROCEDURE — 99232 SBSQ HOSP IP/OBS MODERATE 35: CPT | Performed by: SURGERY

## 2024-04-24 PROCEDURE — 6370000000 HC RX 637 (ALT 250 FOR IP): Performed by: INTERNAL MEDICINE

## 2024-04-24 PROCEDURE — C9113 INJ PANTOPRAZOLE SODIUM, VIA: HCPCS | Performed by: PHYSICIAN ASSISTANT

## 2024-04-24 PROCEDURE — 1200000000 HC SEMI PRIVATE

## 2024-04-24 PROCEDURE — 80048 BASIC METABOLIC PNL TOTAL CA: CPT

## 2024-04-24 PROCEDURE — 36415 COLL VENOUS BLD VENIPUNCTURE: CPT

## 2024-04-24 PROCEDURE — 99232 SBSQ HOSP IP/OBS MODERATE 35: CPT | Performed by: INTERNAL MEDICINE

## 2024-04-24 PROCEDURE — 85025 COMPLETE CBC W/AUTO DIFF WBC: CPT

## 2024-04-24 RX ADMIN — AMLODIPINE BESYLATE 10 MG: 5 TABLET ORAL at 08:13

## 2024-04-24 RX ADMIN — LISINOPRIL 20 MG: 20 TABLET ORAL at 08:13

## 2024-04-24 RX ADMIN — SODIUM CHLORIDE, PRESERVATIVE FREE 10 ML: 5 INJECTION INTRAVENOUS at 20:18

## 2024-04-24 RX ADMIN — SODIUM CHLORIDE, PRESERVATIVE FREE 10 ML: 5 INJECTION INTRAVENOUS at 08:13

## 2024-04-24 RX ADMIN — ASPIRIN 81 MG 81 MG: 81 TABLET ORAL at 08:13

## 2024-04-24 RX ADMIN — PANTOPRAZOLE SODIUM 40 MG: 40 INJECTION, POWDER, FOR SOLUTION INTRAVENOUS at 08:13

## 2024-04-24 RX ADMIN — LISINOPRIL 20 MG: 20 TABLET ORAL at 20:16

## 2024-04-24 RX ADMIN — PANTOPRAZOLE SODIUM 40 MG: 40 INJECTION, POWDER, FOR SOLUTION INTRAVENOUS at 20:18

## 2024-04-24 ASSESSMENT — PAIN SCALES - GENERAL: PAINLEVEL_OUTOF10: 0

## 2024-04-25 VITALS
WEIGHT: 151.9 LBS | DIASTOLIC BLOOD PRESSURE: 75 MMHG | SYSTOLIC BLOOD PRESSURE: 128 MMHG | HEIGHT: 63 IN | RESPIRATION RATE: 18 BRPM | OXYGEN SATURATION: 97 % | BODY MASS INDEX: 26.91 KG/M2 | TEMPERATURE: 97.8 F | HEART RATE: 87 BPM

## 2024-04-25 LAB
ANION GAP SERPL CALCULATED.3IONS-SCNC: 10 MMOL/L (ref 3–16)
BASOPHILS # BLD: 0 K/UL (ref 0–0.2)
BASOPHILS NFR BLD: 0.3 %
BUN SERPL-MCNC: 25 MG/DL (ref 7–20)
CALCIUM SERPL-MCNC: 9.2 MG/DL (ref 8.3–10.6)
CHLORIDE SERPL-SCNC: 102 MMOL/L (ref 99–110)
CO2 SERPL-SCNC: 24 MMOL/L (ref 21–32)
CREAT SERPL-MCNC: 0.7 MG/DL (ref 0.6–1.2)
DEPRECATED RDW RBC AUTO: 15.4 % (ref 12.4–15.4)
EOSINOPHIL # BLD: 0 K/UL (ref 0–0.6)
EOSINOPHIL NFR BLD: 0.7 %
GFR SERPLBLD CREATININE-BSD FMLA CKD-EPI: 83 ML/MIN/{1.73_M2}
GLUCOSE SERPL-MCNC: 98 MG/DL (ref 70–99)
HCT VFR BLD AUTO: 36.9 % (ref 36–48)
HGB BLD-MCNC: 12.4 G/DL (ref 12–16)
LYMPHOCYTES # BLD: 0.6 K/UL (ref 1–5.1)
LYMPHOCYTES NFR BLD: 14.3 %
MCH RBC QN AUTO: 30.7 PG (ref 26–34)
MCHC RBC AUTO-ENTMCNC: 33.7 G/DL (ref 31–36)
MCV RBC AUTO: 91.2 FL (ref 80–100)
MONOCYTES # BLD: 0.6 K/UL (ref 0–1.3)
MONOCYTES NFR BLD: 14 %
NEUTROPHILS # BLD: 2.9 K/UL (ref 1.7–7.7)
NEUTROPHILS NFR BLD: 70.7 %
PLATELET # BLD AUTO: 156 K/UL (ref 135–450)
PMV BLD AUTO: 10.4 FL (ref 5–10.5)
POTASSIUM SERPL-SCNC: 4.7 MMOL/L (ref 3.5–5.1)
RBC # BLD AUTO: 4.05 M/UL (ref 4–5.2)
SODIUM SERPL-SCNC: 136 MMOL/L (ref 136–145)
WBC # BLD AUTO: 4 K/UL (ref 4–11)

## 2024-04-25 PROCEDURE — 97530 THERAPEUTIC ACTIVITIES: CPT

## 2024-04-25 PROCEDURE — 80048 BASIC METABOLIC PNL TOTAL CA: CPT

## 2024-04-25 PROCEDURE — 99232 SBSQ HOSP IP/OBS MODERATE 35: CPT | Performed by: SURGERY

## 2024-04-25 PROCEDURE — 36415 COLL VENOUS BLD VENIPUNCTURE: CPT

## 2024-04-25 PROCEDURE — 6360000002 HC RX W HCPCS: Performed by: PHYSICIAN ASSISTANT

## 2024-04-25 PROCEDURE — 2580000003 HC RX 258: Performed by: INTERNAL MEDICINE

## 2024-04-25 PROCEDURE — 97110 THERAPEUTIC EXERCISES: CPT

## 2024-04-25 PROCEDURE — 85025 COMPLETE CBC W/AUTO DIFF WBC: CPT

## 2024-04-25 PROCEDURE — 97535 SELF CARE MNGMENT TRAINING: CPT

## 2024-04-25 PROCEDURE — 6370000000 HC RX 637 (ALT 250 FOR IP): Performed by: INTERNAL MEDICINE

## 2024-04-25 PROCEDURE — C9113 INJ PANTOPRAZOLE SODIUM, VIA: HCPCS | Performed by: PHYSICIAN ASSISTANT

## 2024-04-25 PROCEDURE — 97116 GAIT TRAINING THERAPY: CPT

## 2024-04-25 RX ORDER — PANTOPRAZOLE SODIUM 40 MG/1
40 TABLET, DELAYED RELEASE ORAL
Qty: 60 TABLET | Refills: 0 | Status: SHIPPED | OUTPATIENT
Start: 2024-04-25

## 2024-04-25 RX ORDER — AMLODIPINE BESYLATE 10 MG/1
10 TABLET ORAL DAILY
Qty: 30 TABLET | Refills: 3 | Status: SHIPPED | OUTPATIENT
Start: 2024-04-25

## 2024-04-25 RX ADMIN — SODIUM CHLORIDE, PRESERVATIVE FREE 10 ML: 5 INJECTION INTRAVENOUS at 10:01

## 2024-04-25 RX ADMIN — AMLODIPINE BESYLATE 10 MG: 5 TABLET ORAL at 10:00

## 2024-04-25 RX ADMIN — PANTOPRAZOLE SODIUM 40 MG: 40 INJECTION, POWDER, FOR SOLUTION INTRAVENOUS at 10:00

## 2024-04-25 RX ADMIN — LISINOPRIL 20 MG: 20 TABLET ORAL at 10:00

## 2024-04-25 RX ADMIN — ASPIRIN 81 MG 81 MG: 81 TABLET ORAL at 10:00

## 2024-04-25 ASSESSMENT — PAIN SCALES - GENERAL: PAINLEVEL_OUTOF10: 0

## 2024-04-25 NOTE — PROGRESS NOTES
[facility]    4/22/2024 12:53 PM        Name: Anuja Eid .              Admitted: 4/21/2024  Primary Care Provider: Caesar Carlton DO (Tel: [phone])        Subjective:      Patient pain has improved still having nausea and vomiting to tolerate any food or liquids  Reviewed interval ancillary notes    Current Medications  pantoprazole (PROTONIX) injection 40 mg, BID  aspirin chewable tablet 81 mg, Daily  [Held by provider] furosemide (LASIX) tablet 20 mg, Daily  lisinopril (PRINIVIL;ZESTRIL) tablet 20 mg, BID  sodium chloride flush 0.9 % injection 5-40 mL, 2 times per day  sodium chloride flush 0.9 % injection 5-40 mL, PRN  0.9 % sodium chloride infusion, PRN  polyethylene glycol (GLYCOLAX) packet 17 g, Daily PRN  acetaminophen (TYLENOL) tablet 650 mg, Q6H PRN   Or  acetaminophen (TYLENOL) suppository 650 mg, Q6H PRN  [Held by provider] hydrALAZINE (APRESOLINE) injection 5 mg, Q6H PRN  [Held by provider] apixaban (ELIQUIS) tablet 2.5 mg, BID  ondansetron (ZOFRAN) injection 4 mg, Q6H PRN  amLODIPine (NORVASC) tablet 10 mg, Daily        Objective:  /71   Pulse (!) 112   Temp 98.1 °F (36.7 °C) (Oral)   Resp 18   Ht 1.6 m (5' 3\")   Wt 68.9 kg (152 lb)   SpO2 96%   BMI 26.93 kg/m²     Intake/Output Summary (Last 24 hours) at 4/22/2024 1253  Last data filed at 4/22/2024 0641  Gross per 24 hour   Intake 360 ml   Output --   Net 360 ml      Wt Readings from Last 3 Encounters:   04/22/24 68.9 kg (152 lb)   04/01/24 69.9 kg (154 lb)   10/02/23 72.3 kg (159 lb 6.4 oz)       General appearance:  Appears comfortable. AAOx3  HEENT: atraumatic, Pupils equal, muscous membranes moist, no masses appreciated  Cardiovascular: Regular rate and rhythm no murmurs appreciated  Respiratory: CTAB no wheezing  Gastrointestinal: Abdomen soft, non-tender, BS+  EXT: no edema  Neurology: no gross focal 
               [facility]        Progress Note    Patient Name: Anuja Eid  MRN: [id number]  YOB: 1935  Date of Evaluation: 2024    Chief Complaint: Abdominal pain    Subjective:  No acute events overnight  Pain resolved  Mild nausea that has improved, no vomiting since NGT removed, tolerating clear liquids  Passing flatus and stool  Resting in bed at this time      Vital Signs:  Patient Vitals for the past 24 hrs:   BP Temp Temp src Pulse Resp SpO2 Weight   24 0745 (!) 141/67 97.4 °F (36.3 °C) Oral 64 18 99 % --   24 0535 -- -- -- -- -- -- 71.8 kg (158 lb 4.6 oz)   24 1945 (!) 124/55 97.5 °F (36.4 °C) Oral 76 16 96 % --        TEMPERATURE HISTORY 24H: Temp (24hrs), Av.5 °F (36.4 °C), Min:97.4 °F (36.3 °C), Max:97.5 °F (36.4 °C)    BLOOD PRESSURE HISTORY: Systolic (36hrs), Av , Min:124 , Max:150    Diastolic (36hrs), Av, Min:55, Max:67      Intake/Output:  I/O last 3 completed shifts:  In: 40 [P.O.:40]  Out: 650 [Urine:350; Emesis/NG output:300]  I/O this shift:  In: 480 [P.O.:480]  Out: -   Drain/tube Output:       Physical Exam:  General: awake, alert, oriented to person, place, time  Cardiovascular:  regular rate and rhythm and no murmur noted  Lungs: clear to auscultation  Abdomen: soft, nontender, nondistended, bowel sounds normal     Labs:  CBC:    Recent Labs     24  0436 24  0603 24  0639   WBC 8.5 5.1 3.8*   HGB 14.7 12.8 11.7*   HCT 44.8 38.5 35.9*    196 166       BMP:    Recent Labs     24  0436 24  0603 24  0639   * 136 133*   K 4.8 4.4 4.0   CL 99 101 99   CO2 22 26 23   BUN 19 30* 32*   CREATININE 0.7 0.9 0.7   GLUCOSE 129* 96 93       Hepatic:  No results for input(s): \"AST\", \"ALT\", \"ALB\", \"BILITOT\", \"ALKPHOS\" in the last 72 hours.  Amylase:  No results found for: \"AMYLASE\"  Lipase:    Lab Results   Component Value Date/Time    LIPASE 14.0 10/01/2020 03:19 PM    
               [facility]        Progress Note    Patient Name: Anuja Eid  MRN: [id number]  YOB: 1935  Date of Evaluation: 2024    Chief Complaint: Abdominal pain    Subjective:  No acute events overnight  Pain resolved  No N.V.  Has had several BM  Up to chair, alert      Vital Signs:  Patient Vitals for the past 24 hrs:   BP Temp Temp src Pulse Resp SpO2 Weight   24 0823 128/75 97.8 °F (36.6 °C) Oral 87 18 97 % --   24 0430 -- -- -- -- -- -- 68.9 kg (151 lb 14.4 oz)   24 135/68 97.7 °F (36.5 °C) Oral 74 17 95 % --        TEMPERATURE HISTORY 24H: Temp (24hrs), Av.8 °F (36.6 °C), Min:97.7 °F (36.5 °C), Max:97.8 °F (36.6 °C)    BLOOD PRESSURE HISTORY: Systolic (36hrs), Av , Min:128 , Max:141    Diastolic (36hrs), Av, Min:67, Max:75      Intake/Output:  I/O last 3 completed shifts:  In: 480 [P.O.:480]  Out: -   No intake/output data recorded.  Drain/tube Output:       Physical Exam:  General: awake, alert, oriented to person, place, time  Cardiovascular:  regular rate and rhythm and no murmur noted  Lungs: clear to auscultation  Abdomen: soft, nontender, nondistended, bowel sounds normal     Labs:  CBC:    Recent Labs     24  0603 24  0639 24  0551   WBC 5.1 3.8* 4.0   HGB 12.8 11.7* 12.4   HCT 38.5 35.9* 36.9    166 156       BMP:    Recent Labs     24  0603 24  0639 24  0551    133* 136   K 4.4 4.0 4.7    99 102   CO2 26 23 24   BUN 30* 32* 25*   CREATININE 0.9 0.7 0.7   GLUCOSE 96 93 98       Hepatic:  No results for input(s): \"AST\", \"ALT\", \"ALB\", \"BILITOT\", \"ALKPHOS\" in the last 72 hours.  Amylase:  No results found for: \"AMYLASE\"  Lipase:    Lab Results   Component Value Date/Time    LIPASE 14.0 10/01/2020 03:19 PM      Mag:    Lab Results   Component Value Date/Time    MG 2.10 2015 04:55 AM    MG 2.50 2015 06:00 AM     Phos:     Lab Results   Component 
      Hospitalist Progress Note      PCP: Caesar Carlton DO    Date of Admission: 4/21/2024    LOS: 3    Chief Complaint: No chief complaint on file.      Case Summary:   88-year-old lady with history of paroxysmal atrial fibrillation, CAD, hypertension, history of CVA, history of aortic Valve stenosis status post AVR, who presented with epigastric abdominal pain, shortness of breath, diaphoresis, nausea and vomiting found to have hypertensive emergency.  There was concern for small bowel obstruction however she continues to have bowel motion and passing flatulence.  She had epigastric abdominal pain for which CT abdomen revealed large hiatus hernia.      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  Denies any abdominal pains today.  NG tube removed yesterday and tolerating clears.  - Will await surgical evaluation and recommendations  - Continue to monitor diet and if no planned surgical intervention to advance diet as tolerated and consider discharge planning in the next 24 hours    Active Problems:    S/P AVR (aortic valve replacement): Stable 
    Gastroenterology Progress Note    Anuja Eid is a 88 y.o. female patient.  No diagnosis found.    Admission Date: 2024  Hospital Day: Hospital Day: 3  Attending: Macy Kumar MD  Date of service: 24    SUBJECTIVE:    Patient had small bowel follow-through earlier today  She has been having loose stools, denies chest pain and abdominal pain    ROS:  Cardiovascular ROS: no chest pain or dyspnea on exertion  Gastrointestinal ROS: see above  Respiratory ROS: no cough, shortness of breath, or wheezing    Physical    VITALS:  BP (!) 124/55   Pulse 76   Temp 97.5 °F (36.4 °C) (Oral)   Resp 16   Ht 1.6 m (5' 3\")   Wt 71 kg (156 lb 8.4 oz)   SpO2 96%   BMI 27.73 kg/m²   TEMPERATURE:  Current - Temp: 97.5 °F (36.4 °C); Max - Temp  Av.7 °F (36.5 °C)  Min: 97.5 °F (36.4 °C)  Max: 98 °F (36.7 °C)    NAD  RRR, Nl s1s2  Lungs CTA Bilaterally, normal effort  Abdomen soft, ND, NT, no HSM, Bowel sounds normal  AAOx3, No asterixis     Data      CBC:   Recent Labs     24  0436 24  0603   WBC 8.5 5.1   RBC 4.88 4.21   HGB 14.7 12.8   HCT 44.8 38.5    196   MCV 91.8 91.3   MCH 30.1 30.5   MCHC 32.7 33.4   RDW 15.8* 15.8*        BMP:  Recent Labs     24  0436 24  0603   * 136   K 4.8 4.4   CL 99 101   CO2 22 26   BUN 19 30*   CREATININE 0.7 0.9   CALCIUM 9.7 9.2   GLUCOSE 129* 96        Hepatic Function Panel:   No results for input(s): \"AST\", \"ALT\", \"BILIDIR\", \"BILITOT\", \"ALKPHOS\" in the last 72 hours.    No results for input(s): \"LIPASE\", \"AMYLASE\" in the last 72 hours.  No results for input(s): \"PROTIME\", \"INR\" in the last 72 hours.  No results for input(s): \"PTT\" in the last 72 hours.  No results for input(s): \"OCCULTBLD\" in the last 72 hours.      Radiology Review:    FL UGI W SMALL BOWEL   Final Result   Moderate size hiatal hernia is seen.  Stomach is essentially inverted in the   chest suggesting chronic organo-axial volvulus..  No evidence of gastric 
  [facility] - Inpatient Rehabilitation Department   Phone: [phone]    Occupational Therapy    [x] Initial Evaluation            [] Daily Treatment Note         [] Discharge Summary      Patient: Anuja Eid   : 1935   MRN: [id number]   Date of Service:  2024    Admitting Diagnosis:  Hypertensive emergency  Current Admission Summary: Anuja Eid is a 88 y.o. female COVID 330 this morning with epigastric pain with nausea some shortness of breath lightheadedness and diaphoresis.  EMS was called and patient blood pressure was 230 systolic patient was seen in the ED at outside hospital given nitroglycerin and patient blood pressure dropped to 100 systolic and patient had syncopal episode.  Patient blood pressure rebounded to 200 systolic, started nicardipine drip and transferred to [facility] patient does not smoke or drink is on Eliquis for A-fib has a history of prosthetic aortic valve no prior history of cardiac stents denies any fevers chills cough sick contacts   Past Medical History:  has a past medical history of Aortic valve disease, CAD (coronary artery disease), CVA (cerebral infarction), and Hypertension.  Past Surgical History:  has a past surgical history that includes Coronary angioplasty with stent (2004); Total knee arthroplasty (Right, 7/15/2013); Cataract removal with implant (Left, 10/9/2015); Cardiac catheterization (2015); Aortic valve replacement (2015); Upper gastrointestinal endoscopy (N/A, 2020); and Colonoscopy (N/A, 2020).    Discharge Recommendations: Anuja Eid scored a 19/24 on the AM-PAC ADL Inpatient form. Current research shows that an AM-PAC score of 18 or greater is typically associated with a discharge to the patient's home setting. Based on the patient's AM-PAC score, and their current ADL deficits, it is recommended that the patient have 2-3 sessions per week of Occupational Therapy at d/c to increase the 
  [facility] - Inpatient Rehabilitation Department   Phone: [phone]    Occupational Therapy    [x] Initial Evaluation            [] Daily Treatment Note         [] Discharge Summary      Patient: Anuja Eid   : 1935   MRN: [id number]   Date of Service:  2024    Admitting Diagnosis:  Hypertensive emergency  Current Admission Summary: Anuja Eid is a 88 y.o. female COVID 330 this morning with epigastric pain with nausea some shortness of breath lightheadedness and diaphoresis.  EMS was called and patient blood pressure was 230 systolic patient was seen in the ED at outside hospital given nitroglycerin and patient blood pressure dropped to 100 systolic and patient had syncopal episode.  Patient blood pressure rebounded to 200 systolic, started nicardipine drip and transferred to [facility] patient does not smoke or drink is on Eliquis for A-fib has a history of prosthetic aortic valve no prior history of cardiac stents denies any fevers chills cough sick contacts   Past Medical History:  has a past medical history of Aortic valve disease, CAD (coronary artery disease), CVA (cerebral infarction), and Hypertension.  Past Surgical History:  has a past surgical history that includes Coronary angioplasty with stent (2004); Total knee arthroplasty (Right, 7/15/2013); Cataract removal with implant (Left, 10/9/2015); Cardiac catheterization (2015); Aortic valve replacement (2015); Upper gastrointestinal endoscopy (N/A, 2020); and Colonoscopy (N/A, 2020).    Discharge Recommendations: Anuja Eid scored a 19/24 on the AM-PAC ADL Inpatient form. Current research shows that an AM-PAC score of 18 or greater is typically associated with a discharge to the patient's home setting. Based on the patient's AM-PAC score, and their current ADL deficits, it is recommended that the patient have 2-3 sessions per week of Occupational Therapy at d/c to increase the 
  [facility] - Inpatient Rehabilitation Department   Phone: [phone]    Physical Therapy    [] Initial Evaluation            [x] Daily Treatment Note         [] Discharge Summary      Patient: Anuja Eid   : 1935   MRN: [id number]   Date of Service:  2024  Admitting Diagnosis: Hypertensive emergency  Current Admission Summary: Anuja Eid is a 88 y.o. female COVID 330 this morning with epigastric pain with nausea some shortness of breath lightheadedness and diaphoresis.  EMS was called and patient blood pressure was 230 systolic patient was seen in the ED at outside hospital given nitroglycerin and patient blood pressure dropped to 100 systolic and patient had syncopal episode.  Patient blood pressure rebounded to 200 systolic, started nicardipine drip and transferred to [facility] patient does not smoke or drink is on Eliquis for A-fib has a history of prosthetic aortic valve no prior history of cardiac stents denies any fevers chills cough sick contacts   Past Medical History:  has a past medical history of Aortic valve disease, CAD (coronary artery disease), CVA (cerebral infarction), and Hypertension.  Past Surgical History:  has a past surgical history that includes Coronary angioplasty with stent (2004); Total knee arthroplasty (Right, 7/15/2013); Cataract removal with implant (Left, 10/9/2015); Cardiac catheterization (2015); Aortic valve replacement (2015); Upper gastrointestinal endoscopy (N/A, 2020); and Colonoscopy (N/A, 2020).  Discharge Recommendations: Anuja Eid scored a 18/24 on the AM-PAC short mobility form. Current research shows that an AM-PAC score of 18 or greater is typically associated with a discharge to the patient's home setting. Based on the patient's AM-PAC score and their current functional mobility deficits, it is recommended that the patient have 2-3 sessions per week of Physical Therapy at d/c to increase 
  Physician Progress Note      PATIENT:               JOSEF VALLEJO  CSN #:                  [id number]  :                       1935  ADMIT DATE:       2024 1:42 PM  DISCH DATE:  RESPONDING  PROVIDER #:        Macy Kumar MD          QUERY TEXT:    Patient admitted with hypertensive emergency and epigastric pain, noted to   have Paroxysmal atrial fibrillation and is maintained on Eliquis. If possible,   please document in progress notes and discharge summary if you are evaluating   and/or treating any of the following:    The medical record reflects the following:  Risk Factors: 88 year old female with history of prosthetic aortic valve, HTN,   and CVA  Clinical Indicators: Per Care Everywhere, Cardiology Consult 2023:   \"HEY1AQ4-RYSy Score: 4\"  H&P: \"on Eliquis for A-fib\"  Treatment: Eliquis PO 2.5 mg BID.  Options provided:  -- Secondary hypercoagulable state in a patient with atrial fibrillation  -- Other - I will add my own diagnosis  -- Disagree - Not applicable / Not valid  -- Disagree - Clinically unable to determine / Unknown  -- Refer to Clinical Documentation Reviewer    PROVIDER RESPONSE TEXT:    This patient has secondary hypercoagulable state in a patient with atrial   fibrillation.    Query created by: Mariluz Harmon on 2024 10:01 AM      Electronically signed by:  Macy Kumar MD 2024 1:20 PM          
  [facility]  H+P  Consult  OP Visit  FU Visit   CC/INTERVAL HX   Admit 4/21/2024   CC CAD, SAVR, HTN, AF   Subjective No acute events overnight.  No cp, sob.    Tele Reviewed available   EXAM   VS BP (!) 124/55   Pulse 76   Temp 97.5 °F (36.4 °C) (Oral)   Resp 16   Ht 1.6 m (5' 3\")   Wt 71.8 kg (158 lb 4.6 oz)   SpO2 96%   BMI 28.04 kg/m²    Gen Alert, coop, no distress Heart  RRR, 1/6   Head NC, AT, no abnorm Abd  Soft, NT, +BS, no mass, no OM   Eyes PER, conj/corn clear Ext  Ext nl, AT, no C/C/E   Nose Nares nl, no drain, NT Pulse 2+ and symmetric   Throat Lips, mucosa, tongue nl Skin Col/text/turg nl, no vis rash/les   Neck S/S, TM, NT, no bruit/JVD Psych Nl mood and affect   Lung CTA-B, unlabored, no DTP Lymph   No cervical or axillary LA   Ch wall NT, no deform Neuro  Nl gross M/S exam      MEDICATIONS   Relevant cardiac medications Reviewed in EPIC   LABS   Hgb Lab Results   Component Value Date    HGB 11.7 (L) 04/24/2024      Cr Lab Results   Component Value Date    CREATININE 0.9 04/23/2024      Trop Lab Results   Component Value Date    TROPHS 22 (H) 04/22/2024    TROPHS 15 (H) 04/21/2024    TROPHS 29 (H) 08/03/2023       ASSESSMENT TIME   More than 35 minutes spent reviewing patient chart (including but not limited to notes, labs, imaging and other testing), interviewing patient and/or family members, performing a physical exam, documentation of my findings above and subsequent follow-up of ordered testing.  More than 50% of that time spent face to face with patient discussing clinical condition and counseling regarding treatment plan.     ASSESSMENT AND PLAN   *CAD  Status Hx PCI LAD  [facility] 5/20 no intervention  TTE 65%, mod AS MG 25mmHg  Plan Atypical pain, trivial troponin elevation   Continued observation, workup abdominal pain   No indication for invasive assessment and patient not enthusiastic about additional cardiac procedures  *SAVR  Status S/p SAVR  TTE 8/23 65%, mild MR, SAVR PV 
  [facility] - Inpatient Rehabilitation Department   Phone: [phone]    Occupational Therapy    [] Initial Evaluation            [x] Daily Treatment Note         [] Discharge Summary      Patient: Anuja Eid   : 1935   MRN: [id number]   Date of Service:  2024    Admitting Diagnosis:  Hypertensive emergency  Current Admission Summary: Anuja Eid is a 88 y.o. female COVID 330 this morning with epigastric pain with nausea some shortness of breath lightheadedness and diaphoresis.  EMS was called and patient blood pressure was 230 systolic patient was seen in the ED at outside hospital given nitroglycerin and patient blood pressure dropped to 100 systolic and patient had syncopal episode.  Patient blood pressure rebounded to 200 systolic, started nicardipine drip and transferred to [facility] patient does not smoke or drink is on Eliquis for A-fib has a history of prosthetic aortic valve no prior history of cardiac stents denies any fevers chills cough sick contacts   Past Medical History:  has a past medical history of Aortic valve disease, CAD (coronary artery disease), CVA (cerebral infarction), and Hypertension.  Past Surgical History:  has a past surgical history that includes Coronary angioplasty with stent (2004); Total knee arthroplasty (Right, 7/15/2013); Cataract removal with implant (Left, 10/9/2015); Cardiac catheterization (2015); Aortic valve replacement (2015); Upper gastrointestinal endoscopy (N/A, 2020); and Colonoscopy (N/A, 2020).    Discharge Recommendations: Anuja Eid scored a 19/24 on the AM-PAC ADL Inpatient form. Current research shows that an AM-PAC score of 18 or greater is typically associated with a discharge to the patient's home setting. Based on the patient's AM-PAC score, and their current ADL deficits, it is recommended that the patient have 2-3 sessions per week of Occupational Therapy at d/c to increase the 
  [facility] - Inpatient Rehabilitation Department   Phone: [phone]    Physical Therapy    [x] Initial Evaluation            [] Daily Treatment Note         [] Discharge Summary      Patient: Anuja Eid   : 1935   MRN: [id number]   Date of Service:  2024  Admitting Diagnosis: Hypertensive emergency  Current Admission Summary: Anuja Eid is a 88 y.o. female COVID 330 this morning with epigastric pain with nausea some shortness of breath lightheadedness and diaphoresis.  EMS was called and patient blood pressure was 230 systolic patient was seen in the ED at outside hospital given nitroglycerin and patient blood pressure dropped to 100 systolic and patient had syncopal episode.  Patient blood pressure rebounded to 200 systolic, started nicardipine drip and transferred to [facility] patient does not smoke or drink is on Eliquis for A-fib has a history of prosthetic aortic valve no prior history of cardiac stents denies any fevers chills cough sick contacts   Past Medical History:  has a past medical history of Aortic valve disease, CAD (coronary artery disease), CVA (cerebral infarction), and Hypertension.  Past Surgical History:  has a past surgical history that includes Coronary angioplasty with stent (2004); Total knee arthroplasty (Right, 7/15/2013); Cataract removal with implant (Left, 10/9/2015); Cardiac catheterization (2015); Aortic valve replacement (2015); Upper gastrointestinal endoscopy (N/A, 2020); and Colonoscopy (N/A, 2020).  Discharge Recommendations: Anuja Eid scored a 18/24 on the AM-PAC short mobility form. Current research shows that an AM-PAC score of 18 or greater is typically associated with a discharge to the patient's home setting. Based on the patient's AM-PAC score and their current functional mobility deficits, it is recommended that the patient have 2-3 sessions per week of Physical Therapy at d/c to increase 
4 Eyes Skin Assessment     NAME:  Anuja Eid  YOB: 1935  MEDICAL RECORD NUMBER:  [id number]    The patient is being assessed for  Transfer to New Unit    I agree that at least one RN has performed a thorough Head to Toe Skin Assessment on the patient. ALL assessment sites listed below have been assessed.      Areas assessed by both nurses:    Head, Face, Ears, Shoulders, Back, Chest, Arms, Elbows, Hands, Sacrum. Buttock, Coccyx, Ischium, Legs. Feet and Heels, and Under Medical Devices         Does the Patient have a Wound? No noted wound(s)       Darrell Prevention initiated by RN: No  Wound Care Orders initiated by RN: No    Pressure Injury (Stage 3,4, Unstageable, DTI, NWPT, and Complex wounds) if present, place Wound referral order by RN under : No    New Ostomies, if present place, Ostomy referral order under : No     Nurse 1 eSignature: Electronically signed by Vinita Phillips RN on 4/22/24 at 1:09 AM EDT    **SHARE this note so that the co-signing nurse can place an eSignature**    Nurse 2 eSignature: Electronically signed by Sheela Nguyen RN on 4/22/24 at 6:06 AM EDT   
Assessment completed, see doc flowsheets. Pt is A&O X4. Lung sounds are clear. VSS. Tele on. Medication given per MAR. Patient has no needs at this time. Call light within in reach, will continue to monitor.     
Blood pressure sustaining in 180's, not due for PRN medication. Notified hospitalist.     Received orders for increased amlodipine dose from 5mg to 10mg.   
Patient admitted to 2908, transferred from [facility]. Attached to CVU monitors. Notified hospitalist of arrival.     Patient reports waking up at 3am with severe upper abdominal pain. Denies shortness of breath. Reports ongoing nausea and 1 episode of vomiting. Reports having an eye appt 2 weeks ago and \"found blood behind right eye\". She reports a fall in early March that required stitches and broke a tooth.   
Pt discharged home with all belongings. Instructions reviewed with Pt and daughter. Questions answered.  
Pt wanted to have BM in bathroom, did not think she could go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

## 2024-04-25 NOTE — PLAN OF CARE
Problem: Pain  Goal: Verbalizes/displays adequate comfort level or baseline comfort level  4/25/2024 0835 by Abhinav Luong, RN  Outcome: Progressing     Problem: Safety - Adult  Goal: Free from fall injury  4/25/2024 0835 by Abhinav Luong, RN  Outcome: Progressing     Problem: ABCDS Injury Assessment  Goal: Absence of physical injury  4/25/2024 0835 by Abhinav Luong, RN  Outcome: Progressing     Problem: Discharge Planning  Goal: Discharge to home or other facility with appropriate resources  4/25/2024 0835 by Abhinav Luong, RN  Outcome: Progressing

## 2024-04-25 NOTE — DISCHARGE SUMMARY
Stable    Discharge Instructions/Follow-up:    PCP follow-up in 1 to 2 weeks  GI clinic follow-up in 2 weeks in view of endoscopy and manometry  General surgery follow-up in 2 to 4 weeks post GI evaluation to consider hernia repair    Code Status:  DNR-CCA     Activity: activity as tolerated    Diet: regular diet      Discharge Medications:     Current Discharge Medication List             Details   amLODIPine (NORVASC) 10 MG tablet Take 1 tablet by mouth daily  Qty: 30 tablet, Refills: 3                Details   pantoprazole (PROTONIX) 40 MG tablet Take 1 tablet by mouth 2 times daily (before meals)  Qty: 60 tablet, Refills: 0                Details   lisinopril (PRINIVIL;ZESTRIL) 20 MG tablet Take 1 tablet by mouth in the morning and at bedtime  Qty: 180 tablet, Refills: 3      furosemide (LASIX) 20 MG tablet Take 1 tablet by mouth daily  Qty: 90 tablet, Refills: 3      aspirin 81 MG chewable tablet Take 1 tablet by mouth daily  Qty: 90 tablet, Refills: 3      apixaban (ELIQUIS) 2.5 MG TABS tablet Take 1 tablet by mouth 2 times daily  Qty: 180 tablet, Refills: 3      ferrous sulfate (FEROSUL) 325 (65 Fe) MG tablet Take 1 tablet by mouth daily (with breakfast)  Qty: 30 tablet, Refills: 3      Multiple Vitamin (MULTIVITAMIN ADULT PO) Take by mouth daily             Time Spent on discharge: 35 minutes in the examination, evaluation, counseling and review of medications and discharge plan.      Signed:    Macy Kumar MD   4/25/2024      Thank you Caesar Carlton DO for the opportunity to be involved in this patient's care. If you have any questions or concerns, please feel free to contact me at [phone].

## 2024-04-25 NOTE — FLOWSHEET NOTE
04/25/24 1121   IMM Letter   IMM Letter given to Patient/Family/Significant other/Guardian/POA/by: Given to patient by Social Work/Case Management student Supriya   IMM Letter date given: 04/25/24   IMM Letter time given: 1112       CM left a voicemail with [facility] admissions staff, Michelle, [phone] letting her know that patient is discharging today.     Electronically signed by SUPRIYA GUPTA on 4/25/2024 at 11:22 AM    Transition of Care Plan   RUR: 5%   Disposition: The disposition plan is home with family assistance   F/U with PCP/Specialist     Transport:      Reason for Admission:  Lung mass                   RUR Score:     5%                Plan for utilizing home health:  Not recommended         PCP: First and Last name:  Thuy Knott MD     Name of Practice:    Are you a current patient: Yes/No:    Approximate date of last visit:    Can you participate in a virtual visit with your PCP:                     Current Advanced Directive/Advance Care Plan: Full Code      Healthcare Decision Maker:                     Transition of Care Plan:                      CRM spoke with patient's , introduced self, explained role, verified demographics, and offered assistance. The patient lives with her  in a home with 5 steps to enter. The Patient requires some assistance with her ADL's/IADL's and does not have any DME. The patient uses [address] for her prescriptions. Care Management Interventions  PCP Verified by CM: Yes  Palliative Care Criteria Met (RRAT>21 & CHF Dx)?: No  Mode of Transport at Discharge:  Other (see comment) ()  Transition of Care Consult (CM Consult): Discharge Planning  MyChart Signup: No  Discharge Durable Medical Equipment: No  Health Maintenance Reviewed: Yes  Physical Therapy Consult: No  Occupational Therapy Consult: No  Speech Therapy Consult: No  Support Systems: Spouse/Significant Other  Confirm Follow Up Transport: Family  [employer] Information Provided?: No  Discharge Location  Patient Expects to be Discharged to[de-identified] Home with family assistance    5:22 PM  LUIS MIGUEL Mendes

## 2024-04-26 ENCOUNTER — TELEPHONE (OUTPATIENT)
Dept: CARDIOLOGY CLINIC | Age: 89
End: 2024-04-26

## 2024-04-26 RX ORDER — CLINDAMYCIN HYDROCHLORIDE 300 MG/1
CAPSULE ORAL
Qty: 4 CAPSULE | Refills: 0 | Status: SHIPPED | OUTPATIENT
Start: 2024-04-26

## 2024-05-01 NOTE — PROGRESS NOTES
[facility]   Cardiac Follow up    Referring Provider:  Caesar Carlton DO     Chief Complaint   Patient presents with    Follow-up    Coronary Artery Disease    Hypertension    Dizziness    Hyperlipidemia     History of Present Illness:  Anuja Eid is an 88 y.o. 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 She has had 2 strokes ([place]/[facility]). ILR implanted 8/9/19 with Dr Vázquez.     She was admitted May 2020 with chest tightness and dyspnea. LHC showed high output CHF due to symptomatic anemia. Taken off Plavix and had GI consult with EGD/colonoscopy.     passed on April 8, 2023. She is using a cane. She reported that she went into afib in November 2022     S/p DCCV 6/7/2023 and 7/5/2023 8/4-8/6/2023 she was admitted to [facility] for CTA neck indicated small intraluminal thrombus within the intracranial V4 segment of the right vertebral artery    She was seen in ER 5/26/24. Pt coming from home d/t SBP >200. Reported feeling SOB and chest pressure, \"strange\" headache. This is why she took her BP . At this visit, they stopped the prednisone that she was put on the day before for uticaria.     Today, Anuja is here follow up. She is not feeling well since endoscopy. After procedure, she had a reaction of shivering.  She is not interested in having hiatal hernia repaired. Her head feels \"spacy and dizzy.\"  Sbp has been running 155-160. She was taking Norvasc prn , lately she has been taking it every morning. She has been taking lisinopril regularly too. The eye doctor found blood behind right eye. She denies any reflux. She is with her daughter.     Past Medical History:   has a past medical history of Aortic valve disease, Atrial fibrillation, currently in sinus rhythm, CAD (coronary artery disease), CVA (cerebral infarction), Esophagus tear, Hypertension, and Prolonged emergence from general anesthesia.     Surgical History:   has a past

## 2024-05-03 NOTE — PROGRESS NOTES
Patient reached _X___ yes  _____ no   VM instructions left ____ yes   phone number ________                                ____ no-office notified          Date __5/15/24_______  Time 1230_______  Arrival __1100  hosp-endo____    Nothing to eat or drink after midnight-follow your doctors prep instructions-this may include taking a second dose of your prep after midnight  Responsible adult 18 or older to stay on site while you are here-drive you home-stay with you after  Follow any instructions your doctors office has given you  Bring a complete list of all your medications and supplements including name,dose,how often taken the day of your procedure  If you normally take the following medications in the morning please do so the AM of your procedure with a small sip of water       Heart,blood pressure,seizure,thyroid or breathing medications-use your inhalers-bring any rescue inhalers with you DOS       DO NOT take blood pressure medications ending in \"meg\" or \"pril\" the AM of procedure or evening prior-DO NOT TAKE LISINOPRIL  Dr Adam patients are not to take any medications the AM of surgery  Take half or your normal dose of any long acting insulins the night before your procedure-do not take any diabetic medications the AM of procedure  Follow your doctors instructions regarding stopping or taking  any blood thinners-if you do not have instructions-call them-CHECK ELIQUIS  Any questions call your doctor  Other ______________________________________________________________    VISITOR POLICY(subject to change)             The current policy is 2 visitors per patient.There are no children allowed.Mask at discretion of facility. Visiting hours are 8a-8p.Overnight visitors will be at the discretion of the nurse. All policies are subject to change.

## 2024-05-06 ENCOUNTER — TELEPHONE (OUTPATIENT)
Dept: CARDIOLOGY CLINIC | Age: 89
End: 2024-05-06

## 2024-05-09 ENCOUNTER — OFFICE VISIT (OUTPATIENT)
Dept: SURGERY | Age: 89
End: 2024-05-09
Payer: MEDICARE

## 2024-05-09 VITALS — BODY MASS INDEX: 27.1 KG/M2 | DIASTOLIC BLOOD PRESSURE: 75 MMHG | SYSTOLIC BLOOD PRESSURE: 128 MMHG | WEIGHT: 153 LBS

## 2024-05-09 DIAGNOSIS — K21.00 HIATAL HERNIA WITH GERD AND ESOPHAGITIS: Primary | ICD-10-CM

## 2024-05-09 DIAGNOSIS — K44.9 HIATAL HERNIA WITH GERD AND ESOPHAGITIS: Primary | ICD-10-CM

## 2024-05-09 PROCEDURE — 99213 OFFICE O/P EST LOW 20 MIN: CPT | Performed by: SURGERY

## 2024-05-09 PROCEDURE — 1123F ACP DISCUSS/DSCN MKR DOCD: CPT | Performed by: SURGERY

## 2024-05-09 NOTE — PROGRESS NOTES
[facility] GENERAL AND LAPAROSCOPIC SURGERY          PATIENT NAME: Anuja Eid     TODAY'S DATE: 5/9/2024    CC: Hiatal hernia  SUBJECTIVE:    Pt has been at home, doing pretty well, no recurring emesis, having BM. Abd treating her well. More active, gaining energy.     OBJECTIVE:  VITALS:  Wt 69.4 kg (153 lb)   BMI 27.10 kg/m²     CONSTITUTIONAL:  awake and alert  LUNGS:  clear to auscultation  HEART: RRR  ABDOMEN:  normal bowel sounds, soft, non-distended, non-tender         Radiology Review:      CT OF THE CHEST, ABDOMEN, AND PELVIS WITH CONTRAST 4/22/2024 10:49 am     TECHNIQUE:  CT of the chest, abdomen and pelvis was performed with the administration of  intravenous contrast. Multiplanar reformatted images are provided for review.  Automated exposure control, iterative reconstruction, and/or weight based  adjustment of the mA/kV was utilized to reduce the radiation dose to as low  as reasonably achievable.     COMPARISON:  CT of the abdomen pelvis dated 10/01/2020     HISTORY:  ORDERING SYSTEM PROVIDED HISTORY: substernal chest pain n/v, known large  hiatal hernia. eval hiatal hernia  TECHNOLOGIST PROVIDED HISTORY:  Reason for exam:->substernal chest pain n/v, known large hiatal hernia. eval  hiatal hernia  Additional Contrast?->Oral  Reason for Exam: substernal chest pain n/v, known large hiatal hernia. eval  hiatal hernia     CHEST:  Lower neck: No lymphadenopathy.     Medical devices: None.     Heart: No cardiomegaly. A significant burden of coronary artery  calcifications are present. No pericardial fluid is present. Patient is  status post aortic valve replacement.     Vascular Structures: A normal three vessel aortic arch is present. The aorta  is normal in caliber. The main pulmonary artery is normal in size.     Mediastinum: No suspicious lymphadenopathy is present. A large hiatal hernia  is present with the majority of the stomach within the posterior mediastinum.     Central airways:

## 2024-05-15 ENCOUNTER — ANESTHESIA EVENT (OUTPATIENT)
Dept: ENDOSCOPY | Age: 89
End: 2024-05-15
Payer: MEDICARE

## 2024-05-15 ENCOUNTER — HOSPITAL ENCOUNTER (OUTPATIENT)
Age: 89
Setting detail: OUTPATIENT SURGERY
Discharge: HOME | End: 2024-05-15
Attending: INTERNAL MEDICINE | Admitting: INTERNAL MEDICINE
Payer: MEDICARE

## 2024-05-15 ENCOUNTER — ANESTHESIA (OUTPATIENT)
Dept: ENDOSCOPY | Age: 89
End: 2024-05-15
Payer: MEDICARE

## 2024-05-15 ENCOUNTER — HOSPITAL ENCOUNTER (OUTPATIENT)
Dept: ENDOSCOPY | Age: 89
Discharge: HOME | End: 2024-05-15

## 2024-05-15 VITALS
OXYGEN SATURATION: 96 % | DIASTOLIC BLOOD PRESSURE: 64 MMHG | WEIGHT: 153 LBS | HEIGHT: 63 IN | HEART RATE: 79 BPM | SYSTOLIC BLOOD PRESSURE: 140 MMHG | RESPIRATION RATE: 12 BRPM | BODY MASS INDEX: 27.11 KG/M2 | TEMPERATURE: 97 F

## 2024-05-15 PROCEDURE — 3700000001 HC ADD 15 MINUTES (ANESTHESIA): Performed by: INTERNAL MEDICINE

## 2024-05-15 PROCEDURE — 7100000010 HC PHASE II RECOVERY - FIRST 15 MIN: Performed by: INTERNAL MEDICINE

## 2024-05-15 PROCEDURE — 2500000003 HC RX 250 WO HCPCS: Performed by: NURSE ANESTHETIST, CERTIFIED REGISTERED

## 2024-05-15 PROCEDURE — 3609015500 HC GASTRIC/DUODENAL MOTILITY &/OR MANOMETRY STUDY

## 2024-05-15 PROCEDURE — 7100000011 HC PHASE II RECOVERY - ADDTL 15 MIN: Performed by: INTERNAL MEDICINE

## 2024-05-15 PROCEDURE — 7100000000 HC PACU RECOVERY - FIRST 15 MIN: Performed by: INTERNAL MEDICINE

## 2024-05-15 PROCEDURE — 7100000001 HC PACU RECOVERY - ADDTL 15 MIN: Performed by: INTERNAL MEDICINE

## 2024-05-15 PROCEDURE — 3700000000 HC ANESTHESIA ATTENDED CARE: Performed by: INTERNAL MEDICINE

## 2024-05-15 PROCEDURE — 3609017100 HC EGD: Performed by: INTERNAL MEDICINE

## 2024-05-15 PROCEDURE — 2580000003 HC RX 258: Performed by: ANESTHESIOLOGY

## 2024-05-15 PROCEDURE — 6360000002 HC RX W HCPCS: Performed by: NURSE ANESTHETIST, CERTIFIED REGISTERED

## 2024-05-15 PROCEDURE — 2709999900 HC NON-CHARGEABLE SUPPLY: Performed by: INTERNAL MEDICINE

## 2024-05-15 RX ORDER — PROPOFOL 10 MG/ML
INJECTION, EMULSION INTRAVENOUS PRN
Status: DISCONTINUED | OUTPATIENT
Start: 2024-05-15 | End: 2024-05-15 | Stop reason: SDUPTHER

## 2024-05-15 RX ORDER — LIDOCAINE HYDROCHLORIDE 20 MG/ML
INJECTION, SOLUTION EPIDURAL; INFILTRATION; INTRACAUDAL; PERINEURAL PRN
Status: DISCONTINUED | OUTPATIENT
Start: 2024-05-15 | End: 2024-05-15 | Stop reason: SDUPTHER

## 2024-05-15 RX ORDER — SODIUM CHLORIDE 9 MG/ML
INJECTION, SOLUTION INTRAVENOUS CONTINUOUS
Status: DISCONTINUED | OUTPATIENT
Start: 2024-05-15 | End: 2024-05-15 | Stop reason: HOSPADM

## 2024-05-15 RX ADMIN — LIDOCAINE HYDROCHLORIDE 80 MG: 20 INJECTION, SOLUTION EPIDURAL; INFILTRATION; INTRACAUDAL; PERINEURAL at 12:38

## 2024-05-15 RX ADMIN — GLUCAGON HYDROCHLORIDE 1 MG: KIT at 13:19

## 2024-05-15 RX ADMIN — PHENYLEPHRINE HYDROCHLORIDE 100 MCG: 10 INJECTION INTRAVENOUS at 13:23

## 2024-05-15 RX ADMIN — PROPOFOL 150 MCG/KG/MIN: 10 INJECTION, EMULSION INTRAVENOUS at 12:40

## 2024-05-15 RX ADMIN — SODIUM CHLORIDE: 9 INJECTION, SOLUTION INTRAVENOUS at 12:21

## 2024-05-15 RX ADMIN — PROPOFOL 50 MG: 10 INJECTION, EMULSION INTRAVENOUS at 12:45

## 2024-05-15 RX ADMIN — PROPOFOL 50 MG: 10 INJECTION, EMULSION INTRAVENOUS at 12:38

## 2024-05-15 ASSESSMENT — ENCOUNTER SYMPTOMS: SHORTNESS OF BREATH: 0

## 2024-05-15 ASSESSMENT — PAIN - FUNCTIONAL ASSESSMENT: PAIN_FUNCTIONAL_ASSESSMENT: NONE - DENIES PAIN

## 2024-05-15 NOTE — PROGRESS NOTES
Pt waking up, denies c/o pain or discomfort, pt is alert and oriented, cont to be drowsy. VSS, O2 sat 94% on room air. Pt transitioned to phase II level of care.

## 2024-05-15 NOTE — BRIEF OP NOTE
Brief Postoperative Note      Patient: Anuja Eid  YOB: 1935  MRN: [id number]    Date of Procedure: 5/15/2024    Pre-Op Diagnosis Codes:     * Hiatal hernia [K44.9]       Procedure(s):  ESOPHAGOGASTRODUODENOSCOPY DIAGNOSTIC ONLY    Surgeon(s):  Alex Barreto MD    Anesthesia: Monitor Anesthesia Care    Estimated Blood Loss (mL): Minimal    Complications: None  Findings:  Regular Z-line at 33 cm. 10 cm hiatal hernia (33 to 43 cm). EMS was guided down into the GEJ. The EMS probe keeps getting coiled in the distal esophagus. After attempting for 1 hour, the EMS probe tip was just beyond the GEJ at 34 cm    Plans:  Await EMS results     Electronically signed by Alex Barreto MD on 5/15/2024 at 1:31 PM

## 2024-05-15 NOTE — ANESTHESIA PRE PROCEDURE
\"POCNA\", \"POCK\", \"POCCL\", \"POCBUN\", \"POCHEMO\", \"POCHCT\" in the last 72 hours.    Coags:   Lab Results   Component Value Date/Time    PROTIME 12.1 10/01/2020 03:19 PM    INR 1.04 10/01/2020 03:19 PM    APTT 35.0 11/30/2015 10:15 AM       HCG (If Applicable): No results found for: \"PREGTESTUR\", \"PREGSERUM\", \"HCG\", \"HCGQUANT\"     ABGs:   Lab Results   Component Value Date/Time    PHART 7.336 12/04/2015 08:14 PM    PO2ART 70 12/04/2015 08:14 PM    DEP1MMY 42 12/04/2015 08:14 PM    ECY5ETA 22.3 12/04/2015 08:14 PM    BEART -4 12/04/2015 08:14 PM    A6GRFARR 92 12/04/2015 08:14 PM        Type & Screen (If Applicable):  No results found for: \"LABABO\"    Drug/Infectious Status (If Applicable):  No results found for: \"HIV\", \"HEPCAB\"    COVID-19 Screening (If Applicable):   Lab Results   Component Value Date/Time    COVID19 NOT DETECTED 05/04/2020 12:55 PM           Anesthesia Evaluation  Patient summary reviewed and Nursing notes reviewed   no history of anesthetic complications:   Airway: Mallampati: II  TM distance: >3 FB   Neck ROM: full  Mouth opening: > = 3 FB   Dental: normal exam         Pulmonary:normal exam    (+)     sleep apnea:           (-) COPD, asthma and shortness of breath                           Cardiovascular:  Exercise tolerance: poor (<4 METS)  (+) hypertension:, valvular problems/murmurs (s/p AVR, residual moderate stenosis): AS, dysrhythmias: atrial fibrillation, hyperlipidemia    (-) CABG/stent      Rhythm: regular  Rate: normal                 ROS comment: TTE    Conclusions      Summary   *Left ventricle - mild concentric LVH, normal size and function EF 65%   *Mitral valve - annular calcification, mild regurgitation   *Aortic valve - well seated bioprosthetic valve, moderate stenosis with PV   321m/s, MG 25mmHg, EOA 0.94cm2, DVI 0.33, mild AI   *Tricuspid valve - moderate regurgitation with RVSP of 35mmHg   *Atria - dilated          Neuro/Psych:   (+) CVA:   (-) seizures and TIA

## 2024-05-15 NOTE — H&P
Pre-operative History and Physical    Patient: Anuja Eid  : 1935  Acct#:     History Obtained From:  patient    HISTORY OF PRESENT ILLNESS:    The patient is a 88 y.o. female with significant past medical history of large HH and intermittent gastric volvulus who presents with for EGD with EMS.    Past Medical History:        Diagnosis Date    Aortic valve disease     Atrial fibrillation, currently in sinus rhythm     CAD (coronary artery disease)     CVA (cerebral infarction) 10/01/2015    Esophagus tear     during cardiac procedure    Hypertension     Prolonged emergence from general anesthesia      Past Surgical History:        Procedure Laterality Date    AORTIC VALVE REPLACEMENT  2015    AVR porcine by DR. Waller    CARDIAC CATHETERIZATION  2015    Dr. Jones - no flow restrictive stenoses in coronary arteries    CATARACT REMOVAL WITH IMPLANT Left 10/09/2015    COLONOSCOPY N/A 2020    COLONOSCOPY POLYPECTOMY SNARE/COLD BIOPSY performed by Christopher Zhang MD at [facility] ENDOSCOPY    CORONARY ANGIOPLASTY WITH STENT PLACEMENT  2004    INSERTABLE CARDIAC MONITOR      non-functioning    TOTAL KNEE ARTHROPLASTY Right 07/15/2013    TRANSESOPHAGEAL ECHOCARDIOGRAM      esophagus tore during procedure    UPPER GASTROINTESTINAL ENDOSCOPY N/A 2020    EGD BIOPSY performed by Christopher Zhang MD at [facility] ENDOSCOPY     Medications Prior to Admission:   No current facility-administered medications on file prior to encounter.     Current Outpatient Medications on File Prior to Encounter   Medication Sig Dispense Refill    amLODIPine (NORVASC) 10 MG tablet Take 1 tablet by mouth daily 30 tablet 3    pantoprazole (PROTONIX) 40 MG tablet Take 1 tablet by mouth 2 times daily (before meals) (Patient taking differently: Take 1 tablet by mouth as needed) 60 tablet 0    lisinopril (PRINIVIL;ZESTRIL) 20 MG tablet Take 1 tablet by mouth in the morning and at bedtime 180 tablet 3

## 2024-05-15 NOTE — ANESTHESIA POSTPROCEDURE EVALUATION
Department of Anesthesiology  Postprocedure Note    Patient: Anuja Eid  MRN: [id number]  YOB: 1935  Date of evaluation: 5/15/2024    Procedure Summary       Date: 05/15/24 Room / Location: [place] [id number] / [facility]    Anesthesia Start: 1233 Anesthesia Stop: 1337    Procedure: ESOPHAGOGASTRODUODENOSCOPY DIAGNOSTIC ONLY (Abdomen) Diagnosis:       Hiatal hernia      (Hiatal hernia [K44.9])    Surgeons: Alex Barreto MD Responsible Provider: Franklyn Dean MD    Anesthesia Type: MAC ASA Status: 3            Anesthesia Type: No value filed.    Shey Phase I: Shey Score: 10    Shey Phase II: Shey Score: 10    Anesthesia Post Evaluation    Patient location during evaluation: PACU  Patient participation: complete - patient participated  Level of consciousness: awake  Airway patency: patent  Nausea & Vomiting: no nausea and no vomiting  Cardiovascular status: hemodynamically stable  Respiratory status: acceptable  Hydration status: stable  Multimodal analgesia pain management approach  Pain management: adequate    No notable events documented.

## 2024-05-15 NOTE — PROGRESS NOTES
Pt admitted to PACU via stretcher from endscopy. Report received, assessment complete. Pt unresponsive at this time. VSS. Continuous pulse ox and monitoring in place.

## 2024-05-15 NOTE — PROGRESS NOTES
Patient had EGD with Dr. Barreto to place manometry probe. Probe inserted into left nostril. Probe unable to be guided past hernia and into stomach after multiple attempts.  After recovering from anesthesia, patient brought back for manometry test.     Time in Room: 1425   Patient verbalized understanding of Esophageal manometry study.   Study completed.   Discharge instructions given. Patient denied further questions, nausea or pain.  Walked to exit by this RN.  Time out of Room: 7812     Electronically signed by Temi Cordova RN on 5/15/2024 at 3:14 PM

## 2024-05-15 NOTE — DISCHARGE INSTRUCTIONS
ENDOSCOPY DISCHARGE INSTRUCTIONS    You may experience some lightheadedness for the next several hours.  Plan on quiet relaxation for the rest of today.  A responsible adult needs to stay with you today.  Because of the medications you received today-do not drive,operate machinery,or sign any contractual agreement for the next 24 hours.  Do not drink any alcoholic beverages or take any unprescribed medications tonight.  Eat bland food and avoid anything greasy or spicy initially-progress to your normal diet gradually.  Diet restrictions as instructed.  You may resume home medications as instructed.  It is not unusual to experience some mild cramping or gas pains, and you may not have a bowel movement for several days.  If you have any of the following problems, notify your physician or return to the hospital emergency room : fever, chills, excessive bleeding, excessive vomiting, difficulty swallowing, uncontrolled pain, increased abdominal distention, shortness of breath or any other problems.  If you had a polyp removed, avoid strenuous activity for 48 hours.Avoid the use of aspirin or related compounds for one week, unless otherwise instructed by your physician.  You may notice a small amount of blood in your next few bowel movements, but if a large amount passes, call your physician.  If you have a sore throat, you may use lozenges or salt water gargles.  If you had a bronchoscopy and you experience sudden or continued shortness of breath, chest pain, spitting up or vomiting blood, notify your physician or return to the hospital emergency room.

## 2024-05-19 NOTE — PROCEDURES
High Resolution Esophageal Manometry and Impedance      Anuja Eid is a 88 y.o. female patient.  : 1935   PROCEDURE DATE: 5/15/2024    Interpretations    Manometry   Low resting LES pressures  Normal IRP, argues against achalasia   Normal DCI in 60% of the swallows. 40% has weak peristalsis  Normal EUS resting pressures and relaxation in response to swallows    Impedance  0% complete liquid and viscous swallows    Overall, patient is in increased risk for dysphagia with a full Nissen fundoplication     Plans  May proceed with hiatal hernia repair but should do a partial fundoplication       Alex Barreto MD, MSc  [facility]  24

## 2024-05-29 ENCOUNTER — OFFICE VISIT (OUTPATIENT)
Dept: CARDIOLOGY CLINIC | Age: 89
End: 2024-05-29
Payer: MEDICARE

## 2024-05-29 ENCOUNTER — TELEPHONE (OUTPATIENT)
Dept: CARDIOLOGY CLINIC | Age: 89
End: 2024-05-29

## 2024-05-29 VITALS
BODY MASS INDEX: 27.18 KG/M2 | WEIGHT: 153.4 LBS | HEIGHT: 63 IN | SYSTOLIC BLOOD PRESSURE: 162 MMHG | OXYGEN SATURATION: 98 % | DIASTOLIC BLOOD PRESSURE: 74 MMHG | HEART RATE: 78 BPM

## 2024-05-29 DIAGNOSIS — I25.119 CORONARY ARTERY DISEASE INVOLVING NATIVE CORONARY ARTERY OF NATIVE HEART WITH ANGINA PECTORIS (HCC): Primary | ICD-10-CM

## 2024-05-29 DIAGNOSIS — I48.0 PAF (PAROXYSMAL ATRIAL FIBRILLATION) (HCC): ICD-10-CM

## 2024-05-29 DIAGNOSIS — E78.5 HYPERLIPIDEMIA, UNSPECIFIED HYPERLIPIDEMIA TYPE: ICD-10-CM

## 2024-05-29 DIAGNOSIS — Z95.2 S/P AVR (AORTIC VALVE REPLACEMENT): ICD-10-CM

## 2024-05-29 DIAGNOSIS — I10 ESSENTIAL HYPERTENSION: ICD-10-CM

## 2024-05-29 PROCEDURE — 1123F ACP DISCUSS/DSCN MKR DOCD: CPT | Performed by: INTERNAL MEDICINE

## 2024-05-29 PROCEDURE — 99214 OFFICE O/P EST MOD 30 MIN: CPT | Performed by: INTERNAL MEDICINE

## 2024-05-29 RX ORDER — DIPHENHYDRAMINE HCL 25 MG
50 TABLET ORAL EVERY 8 HOURS PRN
COMMUNITY
Start: 2024-05-25

## 2024-05-29 RX ORDER — LISINOPRIL 20 MG/1
20 TABLET ORAL 2 TIMES DAILY
Qty: 180 TABLET | Refills: 3 | Status: SHIPPED | OUTPATIENT
Start: 2024-05-29

## 2024-05-29 RX ORDER — FUROSEMIDE 20 MG/1
20 TABLET ORAL DAILY
Qty: 90 TABLET | Refills: 3 | Status: SHIPPED | OUTPATIENT
Start: 2024-05-29

## 2024-05-29 RX ORDER — PANTOPRAZOLE SODIUM 40 MG/1
40 TABLET, DELAYED RELEASE ORAL DAILY
Qty: 60 TABLET | Refills: 0
Start: 2024-05-29

## 2024-05-29 RX ORDER — AMLODIPINE BESYLATE 10 MG/1
10 TABLET ORAL DAILY
Qty: 90 TABLET | Refills: 3 | Status: SHIPPED | OUTPATIENT
Start: 2024-05-29

## 2024-05-29 NOTE — PATIENT INSTRUCTIONS
Take amlodipine (Norvasc) 10 mg daily, lisinopril 20 mg twice daily, 5 mg Eliquis twice daily and 20 mg lasix daily  Take Protonix once daily  Stop Aspirin  Continue risk factor modifications.   Call for any change in symptoms, call to report any changes in shortness of breath or development of chest pain with activity.    Follow up in 1 mos

## 2024-05-29 NOTE — TELEPHONE ENCOUNTER
Eveline, daughter called to get clarification on the med Eliquis 5mg.  Is the Pt to be taking the 5mg 1 5mg tablet 2x dailly are is the Pt to take 2.5mg 2x daily which will equal 5mg a day.  Please call to give direction.  Thank you  
PT's daughter Eveline  notified and verbalized understanding for moms correct Rx dosage.    
02-Feb-2017

## 2024-06-04 ENCOUNTER — TELEPHONE (OUTPATIENT)
Dept: SURGERY | Age: 89
End: 2024-06-04

## 2024-06-04 NOTE — TELEPHONE ENCOUNTER
I called and LM for pt to call back. Dr. Palmer gave me information to schedule surgery for her.

## 2024-07-03 ENCOUNTER — OFFICE VISIT (OUTPATIENT)
Dept: CARDIOLOGY CLINIC | Age: 89
End: 2024-07-03
Payer: MEDICARE

## 2024-07-03 VITALS
WEIGHT: 155 LBS | HEART RATE: 64 BPM | HEIGHT: 63 IN | BODY MASS INDEX: 27.46 KG/M2 | OXYGEN SATURATION: 99 % | SYSTOLIC BLOOD PRESSURE: 144 MMHG | DIASTOLIC BLOOD PRESSURE: 88 MMHG

## 2024-07-03 DIAGNOSIS — Z95.2 S/P AVR (AORTIC VALVE REPLACEMENT): ICD-10-CM

## 2024-07-03 DIAGNOSIS — E78.5 HYPERLIPIDEMIA, UNSPECIFIED HYPERLIPIDEMIA TYPE: ICD-10-CM

## 2024-07-03 DIAGNOSIS — R55 PRE-SYNCOPE: ICD-10-CM

## 2024-07-03 DIAGNOSIS — I48.0 PAF (PAROXYSMAL ATRIAL FIBRILLATION) (HCC): ICD-10-CM

## 2024-07-03 DIAGNOSIS — I10 ESSENTIAL HYPERTENSION: ICD-10-CM

## 2024-07-03 DIAGNOSIS — I25.119 CORONARY ARTERY DISEASE INVOLVING NATIVE CORONARY ARTERY OF NATIVE HEART WITH ANGINA PECTORIS (HCC): Primary | Chronic | ICD-10-CM

## 2024-07-03 DIAGNOSIS — I35.0 NONRHEUMATIC AORTIC VALVE STENOSIS: Chronic | ICD-10-CM

## 2024-07-03 PROCEDURE — 99214 OFFICE O/P EST MOD 30 MIN: CPT | Performed by: INTERNAL MEDICINE

## 2024-07-03 PROCEDURE — 1123F ACP DISCUSS/DSCN MKR DOCD: CPT | Performed by: INTERNAL MEDICINE

## 2024-07-03 RX ORDER — AMLODIPINE BESYLATE 5 MG/1
5 TABLET ORAL DAILY
Qty: 90 TABLET | Refills: 3 | Status: SHIPPED | OUTPATIENT
Start: 2024-07-03

## 2024-07-03 NOTE — PATIENT INSTRUCTIONS
Recommend to take Claritin or Zyrtec every morning  Continue taking Eliquis 2.5 mg daily   Continue Amlodipine to 5 mg daily - new prescription sent to the pharmacy     7 day heart monitor - placed in office today     Continue risk factor modifications.   Call for any change in symptoms, call to report any changes in shortness of breath or development of chest pain with activity.    Keep follow up office visit and echocardiogram on 8/6/24

## 2024-07-22 NOTE — PROGRESS NOTES
Hypertension, and Prolonged emergence from general anesthesia.     Surgical History:   has a past surgical history that includes Coronary angioplasty with stent (12/22/2004); Total knee arthroplasty (Right, 07/15/2013); Cataract removal with implant (Left, 10/09/2015); Cardiac catheterization (11/19/2015); Aortic valve replacement (12/04/2015); Upper gastrointestinal endoscopy (N/A, 05/08/2020); Colonoscopy (N/A, 05/08/2020); Insertable Cardiac Monitor; transesophageal echocardiogram; and Upper gastrointestinal endoscopy (N/A, 5/15/2024).     Social History:   reports that she has never smoked. She has never used smokeless tobacco. She reports that she does not drink alcohol and does not use drugs.     Family History:  family history includes Heart Disease in her father.     Home Medications:  Prior to Admission medications    Medication Sig Start Date End Date Taking? Authorizing Provider   apixaban (ELIQUIS) 5 MG TABS tablet Take 0.5 tablets by mouth 2 times daily 7/3/24  Yes Catrachito Boykin MD   amLODIPine (NORVASC) 5 MG tablet Take 1 tablet by mouth daily 7/3/24  Yes Catrachito Boykin MD   lisinopril (PRINIVIL;ZESTRIL) 20 MG tablet Take 1 tablet by mouth in the morning and at bedtime 5/29/24  Yes Catrachito Boykin MD   furosemide (LASIX) 20 MG tablet Take 1 tablet by mouth daily  Patient taking differently: Take 1 tablet by mouth as needed 5/29/24  Yes Catrachito Boykin MD   pantoprazole (PROTONIX) 40 MG tablet Take 1 tablet by mouth daily  Patient taking differently: Take 1 tablet by mouth as needed 5/29/24  Yes Catrachito Boykin MD   clindamycin (CLEOCIN) 300 MG capsule Take 300 mg 1 hour before dental procedure and 300 mg 1 hour after procedure 4/26/24  Yes Catrachito Boykin MD   Multiple Vitamin (MULTIVITAMIN ADULT PO) Take by mouth daily   Yes ProviderSirisha MD      Allergies:  Lipitor and Penicillins     Review of Systems:   Constitutional: there has been no unanticipated weight loss. There's been no change

## 2024-07-29 ENCOUNTER — TELEPHONE (OUTPATIENT)
Dept: CARDIOLOGY CLINIC | Age: 89
End: 2024-07-29

## 2024-07-29 NOTE — TELEPHONE ENCOUNTER
Spoke with the patient and advised her of the results below per LES. Patient voiced understanding. Call complete

## 2024-07-29 NOTE — TELEPHONE ENCOUNTER
----- Message from Catrachito Boykin MD sent at 7/29/2024  3:44 PM EDT -----  Let her know she has brief episodes of atrial fib. She needs to stay on eliquis

## 2024-08-06 ENCOUNTER — OFFICE VISIT (OUTPATIENT)
Dept: CARDIOLOGY CLINIC | Age: 89
End: 2024-08-06
Payer: MEDICARE

## 2024-08-06 VITALS
BODY MASS INDEX: 27.64 KG/M2 | HEART RATE: 67 BPM | DIASTOLIC BLOOD PRESSURE: 70 MMHG | WEIGHT: 156 LBS | HEIGHT: 63 IN | SYSTOLIC BLOOD PRESSURE: 158 MMHG | OXYGEN SATURATION: 98 %

## 2024-08-06 DIAGNOSIS — I10 ESSENTIAL HYPERTENSION: ICD-10-CM

## 2024-08-06 DIAGNOSIS — E78.5 HYPERLIPIDEMIA, UNSPECIFIED HYPERLIPIDEMIA TYPE: ICD-10-CM

## 2024-08-06 DIAGNOSIS — I48.0 PAF (PAROXYSMAL ATRIAL FIBRILLATION) (HCC): ICD-10-CM

## 2024-08-06 DIAGNOSIS — I25.119 CORONARY ARTERY DISEASE INVOLVING NATIVE CORONARY ARTERY OF NATIVE HEART WITH ANGINA PECTORIS (HCC): Primary | ICD-10-CM

## 2024-08-06 DIAGNOSIS — Z95.2 S/P AVR (AORTIC VALVE REPLACEMENT): ICD-10-CM

## 2024-08-06 DIAGNOSIS — R55 PRE-SYNCOPE: ICD-10-CM

## 2024-08-06 PROCEDURE — 99214 OFFICE O/P EST MOD 30 MIN: CPT | Performed by: INTERNAL MEDICINE

## 2024-08-06 PROCEDURE — 1123F ACP DISCUSS/DSCN MKR DOCD: CPT | Performed by: INTERNAL MEDICINE

## 2024-08-06 NOTE — PATIENT INSTRUCTIONS
Renal and CBC (labs)  Recommend continuing Eliquis  No medication changes  Continue risk factor modifications.   Call for any change in symptoms, call to report any changes in shortness of breath or development of chest pain with activity.    Follow up in 6 mos

## 2024-08-09 LAB
ALBUMIN: 4.1 G/DL (ref 3.5–5.2)
ANION GAP SERPL CALCULATED.3IONS-SCNC: 10 MMOL/L (ref 4–16)
BASOPHILS ABSOLUTE: 0 %
BASOPHILS ABSOLUTE: 0 THOU/MCL (ref 0–0.2)
BUN BLDV-MCNC: 21 MG/DL (ref 8–26)
CALCIUM SERPL-MCNC: 9.9 MG/DL (ref 8.5–10.4)
CHLORIDE BLD-SCNC: 99 MEQ/L (ref 98–111)
CO2: 26 MMOL/L (ref 21–31)
CREAT SERPL-MCNC: 0.79 MG/DL (ref 0.6–1.2)
EGFR (CKD-EPI): 72 ML/MIN/1.73 M2
EOSINOPHILS ABSOLUTE: 0 THOU/MCL (ref 0.03–0.45)
EOSINOPHILS RELATIVE PERCENT: 1 %
GLUCOSE BLD-MCNC: 88 MG/DL (ref 70–99)
HCT VFR BLD CALC: 38.3 % (ref 36–46)
HEMOGLOBIN: 12.4 G/DL (ref 12–15.2)
LYMPHOCYTES ABSOLUTE: 0.9 THOU/MCL (ref 1–4)
LYMPHOCYTES RELATIVE PERCENT: 27 %
MCH RBC QN AUTO: 29.8 PG (ref 27–33)
MCHC RBC AUTO-ENTMCNC: 32.4 G/DL (ref 32–36)
MCV RBC AUTO: 91.9 FL (ref 82–97)
MONOCYTES ABSOLUTE: 0.4 THOU/MCL (ref 0.2–0.9)
MONOCYTES RELATIVE PERCENT: 12 %
NEUTROPHILS ABSOLUTE: 2.1 THOU/MCL (ref 1.8–7.7)
PDW BLD-RTO: 15.6 %
PHOSPHORUS: 3.5 MG/DL (ref 2.5–4.5)
PLATELET # BLD: 160 THOU/MCL (ref 140–375)
PMV BLD AUTO: 10 FL (ref 7.4–11.5)
POTASSIUM SERPL-SCNC: 4.9 MEQ/L (ref 3.6–5.1)
RBC # BLD: 4.16 MIL/MCL (ref 3.8–5.2)
SEG NEUTROPHILS: 60 %
SODIUM BLD-SCNC: 135 MEQ/L (ref 135–145)
WBC # BLD: 3.5 THOU/MCL (ref 3.6–10.5)

## 2024-08-29 NOTE — PROGRESS NOTES
disease), CVA (cerebral infarction), Esophagus tear, Hypertension, and Prolonged emergence from general anesthesia.     Surgical History:   has a past surgical history that includes Coronary angioplasty with stent (12/22/2004); Total knee arthroplasty (Right, 07/15/2013); Cataract removal with implant (Left, 10/09/2015); Cardiac catheterization (11/19/2015); Aortic valve replacement (12/04/2015); Upper gastrointestinal endoscopy (N/A, 05/08/2020); Colonoscopy (N/A, 05/08/2020); Insertable Cardiac Monitor; transesophageal echocardiogram; and Upper gastrointestinal endoscopy (N/A, 5/15/2024).     Social History:   reports that she has never smoked. She has never used smokeless tobacco. She reports that she does not drink alcohol and does not use drugs.     Family History:  family history includes Heart Disease in her father.     Home Medications:  Prior to Admission medications    Medication Sig Start Date End Date Taking? Authorizing Provider   lisinopril (PRINIVIL;ZESTRIL) 20 MG tablet Take 1 tablet by mouth in the morning and at bedtime 5/29/24  Yes Catrachito Boykin MD   furosemide (LASIX) 20 MG tablet Take 1 tablet by mouth daily  Patient taking differently: Take 1 tablet by mouth as needed 5/29/24  Yes Catrachito Boykin MD   apixaban (ELIQUIS) 5 MG TABS tablet Take 0.5 tablets by mouth 2 times daily  Patient not taking: Reported on 9/3/2024 7/3/24   Catrachito Boykin MD   amLODIPine (NORVASC) 5 MG tablet Take 1 tablet by mouth daily  Patient not taking: Reported on 9/3/2024 7/3/24   Catrachito Boykin MD   pantoprazole (PROTONIX) 40 MG tablet Take 1 tablet by mouth daily  Patient not taking: Reported on 9/3/2024 5/29/24   Catrachito Boykin MD   clindamycin (CLEOCIN) 300 MG capsule Take 300 mg 1 hour before dental procedure and 300 mg 1 hour after procedure  Patient not taking: Reported on 9/3/2024 4/26/24   Catrachito Boykin MD   Multiple Vitamin (MULTIVITAMIN ADULT PO) Take by mouth daily  Patient not taking: Reported on

## 2024-09-03 ENCOUNTER — TELEPHONE (OUTPATIENT)
Dept: CARDIOLOGY CLINIC | Age: 89
End: 2024-09-03

## 2024-09-03 ENCOUNTER — OFFICE VISIT (OUTPATIENT)
Dept: CARDIOLOGY CLINIC | Age: 89
End: 2024-09-03
Payer: MEDICARE

## 2024-09-03 VITALS
OXYGEN SATURATION: 98 % | SYSTOLIC BLOOD PRESSURE: 124 MMHG | BODY MASS INDEX: 27.94 KG/M2 | WEIGHT: 157.7 LBS | HEIGHT: 63 IN | DIASTOLIC BLOOD PRESSURE: 64 MMHG | HEART RATE: 114 BPM

## 2024-09-03 DIAGNOSIS — I48.91 ATRIAL FIBRILLATION WITH RAPID VENTRICULAR RESPONSE (HCC): ICD-10-CM

## 2024-09-03 DIAGNOSIS — I48.0 PAROXYSMAL ATRIAL FIBRILLATION (HCC): ICD-10-CM

## 2024-09-03 DIAGNOSIS — E78.5 HYPERLIPIDEMIA, UNSPECIFIED HYPERLIPIDEMIA TYPE: ICD-10-CM

## 2024-09-03 DIAGNOSIS — I10 ESSENTIAL HYPERTENSION: ICD-10-CM

## 2024-09-03 DIAGNOSIS — I25.119 CORONARY ARTERY DISEASE INVOLVING NATIVE CORONARY ARTERY OF NATIVE HEART WITH ANGINA PECTORIS (HCC): Primary | ICD-10-CM

## 2024-09-03 PROCEDURE — 1123F ACP DISCUSS/DSCN MKR DOCD: CPT | Performed by: INTERNAL MEDICINE

## 2024-09-03 PROCEDURE — 99214 OFFICE O/P EST MOD 30 MIN: CPT | Performed by: INTERNAL MEDICINE

## 2024-09-03 PROCEDURE — 93000 ELECTROCARDIOGRAM COMPLETE: CPT | Performed by: INTERNAL MEDICINE

## 2024-09-03 RX ORDER — FUROSEMIDE 20 MG
20 TABLET ORAL DAILY
Qty: 90 TABLET | Refills: 3 | Status: SHIPPED | OUTPATIENT
Start: 2024-09-03

## 2024-09-03 RX ORDER — AMIODARONE HYDROCHLORIDE 200 MG/1
200 TABLET ORAL DAILY
Qty: 90 TABLET | Refills: 0 | Status: SHIPPED | OUTPATIENT
Start: 2024-09-03

## 2024-09-03 NOTE — TELEPHONE ENCOUNTER
Per LES - See if Ms Eid would be willing to have a Nurse come into her home to assist with medication management.      I tried to call Anuja to discuss.  No answer, voicemail left requesting a return call.

## 2024-09-03 NOTE — PATIENT INSTRUCTIONS
Re-start Eliquis  Continue taking Lisinopril and Lasix    Stop Norvasc    You are taking the Eliquis for stroke prevention because your heart goes in and out of Afib.   DO NOT STOP taking this medication unless otherwise advised by Dr. Boykin    Start Amiodarone 2 of the 200 mg tablets daily for 7 days and then decrease it to 1 of the 200 mg daily     Return to office in 1 week to have a 2 day holter monitor placed      Follow up with Dr. Boykin in 4 weeks

## 2024-09-04 NOTE — PROGRESS NOTES
[facility]   Cardiac Follow up    Referring Provider:  Caesar Carlton DO     Chief Complaint   Patient presents with    4 week FU    Coronary Artery Disease    Hypertension    Hyperlipidemia     History of Present Illness:  Anuja Eid is an 88 y.o. 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 She has had 2 strokes ([place]/[facility]). ILR implanted 8/9/19 with Dr Vázquez.     She was admitted May 2020 with chest tightness and dyspnea. LHC showed high output CHF due to symptomatic anemia. Taken off Plavix and had GI consult with EGD/colonoscopy.     passed on April 8, 2023. She is using a cane. She reported that she went into afib in November 2022     S/p DCCV 6/7/2023 and 7/5/2023 8/4-8/6/2023 she was admitted to 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 Pt coming from home d/t SBP >200. Reported feeling SOB and chest pressure, \"strange\" headache. This is why she took her BP . At this visit, they stopped the prednisone that she was put on the day before for uticaria.     States that she is trying to go back to [place] towards the end of October 2024.      Today, Anuja is here for 4 week follow up to discuss monitor results. She is with her daughter and using wheeled walker.  She has been doing well except her back has recently \"gone out\" and her balance is off.  She hopes to go to [place] the end of October. She takes Eliquis and amiodarone. She prefers to not do cardioversion at this time.  She is asymptomatic with afib. She takes lasix as needed > if she feels she can not breath well, she takes it.  This occurs about once a week, maybe more. For her back pain she uses ice/heat, massage/chiropractor and takes tylenol prn.       Past Medical History:   has a past medical history of Aortic valve disease, Atrial fibrillation,

## 2024-09-04 NOTE — TELEPHONE ENCOUNTER
Patient calling back and I advised her of the message from LES.  She does not want help with her medications from a home nurse at this time.  Patient states her daughter is helping her with her medication.

## 2024-09-30 ENCOUNTER — OFFICE VISIT (OUTPATIENT)
Dept: CARDIOLOGY CLINIC | Age: 89
End: 2024-09-30
Payer: MEDICARE

## 2024-09-30 VITALS
DIASTOLIC BLOOD PRESSURE: 72 MMHG | BODY MASS INDEX: 28.12 KG/M2 | HEART RATE: 96 BPM | HEIGHT: 63 IN | OXYGEN SATURATION: 98 % | WEIGHT: 158.7 LBS | SYSTOLIC BLOOD PRESSURE: 130 MMHG

## 2024-09-30 DIAGNOSIS — Z95.2 S/P AVR (AORTIC VALVE REPLACEMENT): ICD-10-CM

## 2024-09-30 DIAGNOSIS — E78.5 HYPERLIPIDEMIA, UNSPECIFIED HYPERLIPIDEMIA TYPE: ICD-10-CM

## 2024-09-30 DIAGNOSIS — I25.119 CORONARY ARTERY DISEASE INVOLVING NATIVE CORONARY ARTERY OF NATIVE HEART WITH ANGINA PECTORIS (HCC): Primary | ICD-10-CM

## 2024-09-30 DIAGNOSIS — I48.0 PAF (PAROXYSMAL ATRIAL FIBRILLATION) (HCC): ICD-10-CM

## 2024-09-30 DIAGNOSIS — I10 ESSENTIAL HYPERTENSION: ICD-10-CM

## 2024-09-30 PROCEDURE — 99214 OFFICE O/P EST MOD 30 MIN: CPT | Performed by: INTERNAL MEDICINE

## 2024-09-30 PROCEDURE — 1123F ACP DISCUSS/DSCN MKR DOCD: CPT | Performed by: INTERNAL MEDICINE

## 2024-09-30 NOTE — PATIENT INSTRUCTIONS
No medication changes  Try to take lasix on MWF  Continue risk factor modifications.   Call for any change in symptoms, call to report any changes in shortness of breath or development of chest pain with activity.    Follow up in 3 mos

## 2024-10-25 ENCOUNTER — TELEPHONE (OUTPATIENT)
Dept: CARDIOLOGY CLINIC | Age: 89
End: 2024-10-25

## 2024-10-25 RX ORDER — AMIODARONE HYDROCHLORIDE 200 MG/1
TABLET ORAL
Qty: 90 TABLET | Refills: 3 | Status: SHIPPED | OUTPATIENT
Start: 2024-10-25

## 2024-10-25 NOTE — TELEPHONE ENCOUNTER
Requested Prescriptions     Pending Prescriptions Disp Refills    amiodarone (CORDARONE) 200 MG tablet 90 tablet 0     Sig: Take 1 tablet by mouth daily      Last OV:2024 LES    Next OV: 2025 recall LES    Last [id number]     Last Filled: 2024 LES

## 2024-10-25 NOTE — TELEPHONE ENCOUNTER
Medication Refill    Medication needing refilled:    amiodarone (CORDARONE) 200 MG tablet   Dosage of the medication:    How are you taking this medication (QD, BID, TID, QID, PRN):  Take 1 tablet by mouth daily   30 or 90 day supply called in:90    When will you run out of your medication:  Pt is leaving out of town today  Which Pharmacy are we sending the medication to?:  [facility] - [place], [place] - [address] - P [phone] - F [phone]

## 2024-11-08 ENCOUNTER — TELEPHONE (OUTPATIENT)
Dept: CARDIOLOGY CLINIC | Age: 89
End: 2024-11-08

## 2024-11-08 NOTE — TELEPHONE ENCOUNTER
I spoke to Anuja's daughter Eveline [phone];  she is in the process of getting the results faxed over. I told her I would call her back later this afternoon once we received the results. Anuja takes amiodarone 200mg daily.    Last seen 9/30/24. Next f/w to be scheduled for April 2025.

## 2024-11-08 NOTE — TELEPHONE ENCOUNTER
Eveline states pt is in [place] for a few month. Pt went to see her pcp while in [place] and they did bw. BW came back and pcp told Eveline to ask if pt amiodarone could be reduced as this may be effecting her thyroid. I gave [facility] fax # to send lab results to us for les to look @. Please call [facility] to discuss.

## 2024-11-08 NOTE — TELEPHONE ENCOUNTER
Eveline was going to have the results re-faxed as they never arrived. However, I still did not receive them. I LMOM letting her know someone would call her back on Monday to get the name of the office where she was asking to fax, and we can call them.

## 2024-11-12 NOTE — TELEPHONE ENCOUNTER
Spoke to Anuja.  At this time, she does not recall the name of the provider that she saw, but she can check and inform us by phone or MyChart.  She states she will follow up regarding below message.

## 2024-11-13 ENCOUNTER — TELEPHONE (OUTPATIENT)
Dept: CARDIOLOGY CLINIC | Age: 89
End: 2024-11-13

## 2024-11-13 NOTE — TELEPHONE ENCOUNTER
Lab results received from Dr. Deepak Iniguez's Office in [place] and was reviewed by GALEN.  Per GALEN, he does not recommend decreasing the dose of Amiodarone.      I called and spoke to Eveline and advised of the above.  She verbalized understanding.  She stated that Dr. Iniguez is the PCP and that Dr. Parker is the Cardiologist that she sees in [place] and has an appointment scheduled on 1/10/24.  She denied any further questions and/or concerns.

## 2025-01-29 ENCOUNTER — TELEPHONE (OUTPATIENT)
Dept: CARDIOLOGY CLINIC | Age: 89
End: 2025-01-29

## 2025-01-29 NOTE — TELEPHONE ENCOUNTER
I spoke with an associate about pt possible allergy to IV dye. I told her there was nothing noted other than PCN and Lipitor.

## 2025-01-29 NOTE — TELEPHONE ENCOUNTER
Emperatriz called to ask what is the Pt allergic to.  Pt is getting a CTA Tabr, is it the  contrast, X-ray dye are iodine. Pt is schedule today in the office now . Lindsay handled the call

## (undated) DEVICE — BW-412T DISP COMBO CLEANING BRUSH: Brand: SINGLE USE COMBINATION CLEANING BRUSH

## (undated) DEVICE — MOUTHPIECE ENDOSCP L CTRL OPN AND SIDE PORTS DISP

## (undated) DEVICE — TRAP SPEC RETRV CLR PLAS POLYP IN LN SUCT QUIK CTCH

## (undated) DEVICE — GLOVE ORTHO 8   MSG9480

## (undated) DEVICE — FORCEPS BX L240CM WRK CHN 2.8MM STD CAP W/ NDL MIC MESH

## (undated) DEVICE — SET VLV 3 PC AWS DISPOSABLE GRDIAN SCOPEVALET

## (undated) DEVICE — SNARE ENDOSCP L240CM SHTH DIA24MM LOOP W10MM POLYP RND REINF

## (undated) DEVICE — SOLUTION IV IRRIG WATER 500ML POUR BRL ST 2F7113

## (undated) DEVICE — PROCEDURE KIT ENDOSCP CUST

## (undated) DEVICE — GOWN AURORA NONREINF LG: Brand: MEDLINE INDUSTRIES, INC.

## (undated) DEVICE — ENDOSCOPIC KIT 6X3/16 FT COLON W/ 1.1 OZ 2 GWN W/O BRSH

## (undated) DEVICE — SINGLE USE AIR/WATER, SUCTION AND BIOPSY VALVES SET: Brand: ORCAPOD™

## (undated) DEVICE — AIR/WATER CLEANING ADAPTER FOR OLYMPUS® GI ENDOSCOPE: Brand: BULLDOG®